# Patient Record
Sex: FEMALE | Race: WHITE | NOT HISPANIC OR LATINO | URBAN - METROPOLITAN AREA
[De-identification: names, ages, dates, MRNs, and addresses within clinical notes are randomized per-mention and may not be internally consistent; named-entity substitution may affect disease eponyms.]

---

## 2018-05-08 NOTE — ASU PATIENT PROFILE, ADULT - PMH
Arthropathy  Arthritis  Essential hypertension  HTN (hypertension)  Hyperlipidemia  Hyperlipidemia  Hypothyroidism  Hypothyroidism  Uncontrolled type 2 diabetes mellitus  Diabetes mellitus type II, uncontrolled

## 2018-05-09 ENCOUNTER — OUTPATIENT (OUTPATIENT)
Dept: OUTPATIENT SERVICES | Facility: HOSPITAL | Age: 73
LOS: 1 days | Discharge: HOME | End: 2018-05-09

## 2018-05-09 VITALS
RESPIRATION RATE: 20 BRPM | HEIGHT: 62 IN | HEART RATE: 68 BPM | SYSTOLIC BLOOD PRESSURE: 186 MMHG | DIASTOLIC BLOOD PRESSURE: 70 MMHG | TEMPERATURE: 96 F | WEIGHT: 156.09 LBS

## 2018-05-09 VITALS — SYSTOLIC BLOOD PRESSURE: 130 MMHG | HEART RATE: 77 BPM | DIASTOLIC BLOOD PRESSURE: 82 MMHG

## 2018-05-09 DIAGNOSIS — Z95.5 PRESENCE OF CORONARY ANGIOPLASTY IMPLANT AND GRAFT: Chronic | ICD-10-CM

## 2018-05-09 RX ORDER — LISINOPRIL 2.5 MG/1
1 TABLET ORAL
Qty: 0 | Refills: 0 | COMMUNITY

## 2018-05-09 RX ORDER — CLOPIDOGREL BISULFATE 75 MG/1
1 TABLET, FILM COATED ORAL
Qty: 0 | Refills: 0 | COMMUNITY

## 2018-05-11 DIAGNOSIS — E78.00 PURE HYPERCHOLESTEROLEMIA, UNSPECIFIED: ICD-10-CM

## 2018-05-11 DIAGNOSIS — H26.9 UNSPECIFIED CATARACT: ICD-10-CM

## 2018-05-11 DIAGNOSIS — I10 ESSENTIAL (PRIMARY) HYPERTENSION: ICD-10-CM

## 2020-01-01 ENCOUNTER — INPATIENT (INPATIENT)
Facility: HOSPITAL | Age: 75
LOS: 18 days | End: 2020-04-15
Attending: SURGERY | Admitting: SURGERY
Payer: MEDICARE

## 2020-01-01 VITALS
DIASTOLIC BLOOD PRESSURE: 90 MMHG | WEIGHT: 149.91 LBS | RESPIRATION RATE: 20 BRPM | HEART RATE: 85 BPM | SYSTOLIC BLOOD PRESSURE: 197 MMHG | TEMPERATURE: 98 F | OXYGEN SATURATION: 95 %

## 2020-01-01 DIAGNOSIS — J93.0 SPONTANEOUS TENSION PNEUMOTHORAX: ICD-10-CM

## 2020-01-01 DIAGNOSIS — E87.6 HYPOKALEMIA: ICD-10-CM

## 2020-01-01 DIAGNOSIS — Z79.01 LONG TERM (CURRENT) USE OF ANTICOAGULANTS: ICD-10-CM

## 2020-01-01 DIAGNOSIS — N17.0 ACUTE KIDNEY FAILURE WITH TUBULAR NECROSIS: ICD-10-CM

## 2020-01-01 DIAGNOSIS — A41.9 SEPSIS, UNSPECIFIED ORGANISM: ICD-10-CM

## 2020-01-01 DIAGNOSIS — K72.00 ACUTE AND SUBACUTE HEPATIC FAILURE WITHOUT COMA: ICD-10-CM

## 2020-01-01 DIAGNOSIS — Z95.5 PRESENCE OF CORONARY ANGIOPLASTY IMPLANT AND GRAFT: Chronic | ICD-10-CM

## 2020-01-01 DIAGNOSIS — D64.9 ANEMIA, UNSPECIFIED: ICD-10-CM

## 2020-01-01 DIAGNOSIS — R00.1 BRADYCARDIA, UNSPECIFIED: ICD-10-CM

## 2020-01-01 DIAGNOSIS — E87.2 ACIDOSIS: ICD-10-CM

## 2020-01-01 DIAGNOSIS — Z79.84 LONG TERM (CURRENT) USE OF ORAL HYPOGLYCEMIC DRUGS: ICD-10-CM

## 2020-01-01 DIAGNOSIS — E83.39 OTHER DISORDERS OF PHOSPHORUS METABOLISM: ICD-10-CM

## 2020-01-01 DIAGNOSIS — I48.91 UNSPECIFIED ATRIAL FIBRILLATION: ICD-10-CM

## 2020-01-01 DIAGNOSIS — J98.2 INTERSTITIAL EMPHYSEMA: ICD-10-CM

## 2020-01-01 DIAGNOSIS — Z95.5 PRESENCE OF CORONARY ANGIOPLASTY IMPLANT AND GRAFT: ICD-10-CM

## 2020-01-01 DIAGNOSIS — J12.89 OTHER VIRAL PNEUMONIA: ICD-10-CM

## 2020-01-01 DIAGNOSIS — J80 ACUTE RESPIRATORY DISTRESS SYNDROME: ICD-10-CM

## 2020-01-01 DIAGNOSIS — U07.1 COVID-19: ICD-10-CM

## 2020-01-01 DIAGNOSIS — E87.5 HYPERKALEMIA: ICD-10-CM

## 2020-01-01 DIAGNOSIS — I10 ESSENTIAL (PRIMARY) HYPERTENSION: ICD-10-CM

## 2020-01-01 DIAGNOSIS — I25.10 ATHEROSCLEROTIC HEART DISEASE OF NATIVE CORONARY ARTERY WITHOUT ANGINA PECTORIS: ICD-10-CM

## 2020-01-01 DIAGNOSIS — E78.5 HYPERLIPIDEMIA, UNSPECIFIED: ICD-10-CM

## 2020-01-01 DIAGNOSIS — E86.0 DEHYDRATION: ICD-10-CM

## 2020-01-01 DIAGNOSIS — E11.65 TYPE 2 DIABETES MELLITUS WITH HYPERGLYCEMIA: ICD-10-CM

## 2020-01-01 DIAGNOSIS — E03.9 HYPOTHYROIDISM, UNSPECIFIED: ICD-10-CM

## 2020-01-01 DIAGNOSIS — R65.21 SEVERE SEPSIS WITH SEPTIC SHOCK: ICD-10-CM

## 2020-01-01 DIAGNOSIS — Z91.81 HISTORY OF FALLING: ICD-10-CM

## 2020-01-01 LAB
4/8 RATIO: 0.79 RATIO — LOW (ref 0.86–4.14)
4/8 RATIO: 0.87 RATIO — SIGNIFICANT CHANGE UP (ref 0.86–4.14)
A-TUMOR NECROSIS FACT SERPL-MCNC: <5 PG/ML — SIGNIFICANT CHANGE UP
ABS CD8: 106 /UL — SIGNIFICANT CHANGE UP (ref 90–775)
ABS CD8: 68 /UL — LOW (ref 90–775)
ALBUMIN SERPL ELPH-MCNC: 1.3 G/DL — LOW (ref 3.5–5.2)
ALBUMIN SERPL ELPH-MCNC: 1.4 G/DL — LOW (ref 3.5–5.2)
ALBUMIN SERPL ELPH-MCNC: 1.5 G/DL — LOW (ref 3.5–5.2)
ALBUMIN SERPL ELPH-MCNC: 1.5 G/DL — LOW (ref 3.5–5.2)
ALBUMIN SERPL ELPH-MCNC: 1.6 G/DL — LOW (ref 3.5–5.2)
ALBUMIN SERPL ELPH-MCNC: 1.7 G/DL — LOW (ref 3.5–5.2)
ALBUMIN SERPL ELPH-MCNC: 1.7 G/DL — LOW (ref 3.5–5.2)
ALBUMIN SERPL ELPH-MCNC: 1.8 G/DL — LOW (ref 3.5–5.2)
ALBUMIN SERPL ELPH-MCNC: 1.9 G/DL — LOW (ref 3.5–5.2)
ALBUMIN SERPL ELPH-MCNC: 2.1 G/DL — LOW (ref 3.5–5.2)
ALBUMIN SERPL ELPH-MCNC: 2.2 G/DL — LOW (ref 3.5–5.2)
ALBUMIN SERPL ELPH-MCNC: 2.3 G/DL — LOW (ref 3.5–5.2)
ALBUMIN SERPL ELPH-MCNC: 2.4 G/DL — LOW (ref 3.5–5.2)
ALBUMIN SERPL ELPH-MCNC: 2.4 G/DL — LOW (ref 3.5–5.2)
ALBUMIN SERPL ELPH-MCNC: 2.6 G/DL — LOW (ref 3.5–5.2)
ALBUMIN SERPL ELPH-MCNC: 2.9 G/DL — LOW (ref 3.5–5.2)
ALBUMIN SERPL ELPH-MCNC: 3.3 G/DL — LOW (ref 3.5–5.2)
ALBUMIN SERPL ELPH-MCNC: 3.3 G/DL — LOW (ref 3.5–5.2)
ALBUMIN SERPL ELPH-MCNC: 3.5 G/DL — SIGNIFICANT CHANGE UP (ref 3.5–5.2)
ALBUMIN SERPL ELPH-MCNC: 4 G/DL — SIGNIFICANT CHANGE UP (ref 3.5–5.2)
ALP SERPL-CCNC: 100 U/L — SIGNIFICANT CHANGE UP (ref 30–115)
ALP SERPL-CCNC: 100 U/L — SIGNIFICANT CHANGE UP (ref 30–115)
ALP SERPL-CCNC: 105 U/L — SIGNIFICANT CHANGE UP (ref 30–115)
ALP SERPL-CCNC: 110 U/L — SIGNIFICANT CHANGE UP (ref 30–115)
ALP SERPL-CCNC: 112 U/L — SIGNIFICANT CHANGE UP (ref 30–115)
ALP SERPL-CCNC: 117 U/L — HIGH (ref 30–115)
ALP SERPL-CCNC: 124 U/L — HIGH (ref 30–115)
ALP SERPL-CCNC: 125 U/L — HIGH (ref 30–115)
ALP SERPL-CCNC: 127 U/L — HIGH (ref 30–115)
ALP SERPL-CCNC: 130 U/L — HIGH (ref 30–115)
ALP SERPL-CCNC: 137 U/L — HIGH (ref 30–115)
ALP SERPL-CCNC: 138 U/L — HIGH (ref 30–115)
ALP SERPL-CCNC: 152 U/L — HIGH (ref 30–115)
ALP SERPL-CCNC: 161 U/L — HIGH (ref 30–115)
ALP SERPL-CCNC: 186 U/L — HIGH (ref 30–115)
ALP SERPL-CCNC: 189 U/L — HIGH (ref 30–115)
ALP SERPL-CCNC: 192 U/L — HIGH (ref 30–115)
ALP SERPL-CCNC: 204 U/L — HIGH (ref 30–115)
ALP SERPL-CCNC: 238 U/L — HIGH (ref 30–115)
ALP SERPL-CCNC: 270 U/L — HIGH (ref 30–115)
ALP SERPL-CCNC: 272 U/L — HIGH (ref 30–115)
ALP SERPL-CCNC: 277 U/L — HIGH (ref 30–115)
ALP SERPL-CCNC: 288 U/L — HIGH (ref 30–115)
ALP SERPL-CCNC: 66 U/L — SIGNIFICANT CHANGE UP (ref 30–115)
ALP SERPL-CCNC: 72 U/L — SIGNIFICANT CHANGE UP (ref 30–115)
ALP SERPL-CCNC: 75 U/L — SIGNIFICANT CHANGE UP (ref 30–115)
ALP SERPL-CCNC: 76 U/L — SIGNIFICANT CHANGE UP (ref 30–115)
ALP SERPL-CCNC: 84 U/L — SIGNIFICANT CHANGE UP (ref 30–115)
ALP SERPL-CCNC: 92 U/L — SIGNIFICANT CHANGE UP (ref 30–115)
ALT FLD-CCNC: 101 U/L — HIGH (ref 0–41)
ALT FLD-CCNC: 111 U/L — HIGH (ref 0–41)
ALT FLD-CCNC: 112 U/L — HIGH (ref 0–41)
ALT FLD-CCNC: 114 U/L — HIGH (ref 0–41)
ALT FLD-CCNC: 137 U/L — HIGH (ref 0–41)
ALT FLD-CCNC: 14 U/L — SIGNIFICANT CHANGE UP (ref 0–41)
ALT FLD-CCNC: 15 U/L — SIGNIFICANT CHANGE UP (ref 0–41)
ALT FLD-CCNC: 1512 U/L — HIGH (ref 0–41)
ALT FLD-CCNC: 154 U/L — HIGH (ref 0–41)
ALT FLD-CCNC: 16 U/L — SIGNIFICANT CHANGE UP (ref 0–41)
ALT FLD-CCNC: 17 U/L — SIGNIFICANT CHANGE UP (ref 0–41)
ALT FLD-CCNC: 17 U/L — SIGNIFICANT CHANGE UP (ref 0–41)
ALT FLD-CCNC: 19 U/L — SIGNIFICANT CHANGE UP (ref 0–41)
ALT FLD-CCNC: 20 U/L — SIGNIFICANT CHANGE UP (ref 0–41)
ALT FLD-CCNC: 21 U/L — SIGNIFICANT CHANGE UP (ref 0–41)
ALT FLD-CCNC: 22 U/L — SIGNIFICANT CHANGE UP (ref 0–41)
ALT FLD-CCNC: 22 U/L — SIGNIFICANT CHANGE UP (ref 0–41)
ALT FLD-CCNC: 25 U/L — SIGNIFICANT CHANGE UP (ref 0–41)
ALT FLD-CCNC: 27 U/L — SIGNIFICANT CHANGE UP (ref 0–41)
ALT FLD-CCNC: 3343 U/L — HIGH (ref 0–41)
ALT FLD-CCNC: 82 U/L — HIGH (ref 0–41)
ALT FLD-CCNC: 87 U/L — HIGH (ref 0–41)
ALT FLD-CCNC: 89 U/L — HIGH (ref 0–41)
ALT FLD-CCNC: 90 U/L — HIGH (ref 0–41)
ALT FLD-CCNC: 98 U/L — HIGH (ref 0–41)
ANION GAP SERPL CALC-SCNC: 11 MMOL/L — SIGNIFICANT CHANGE UP (ref 7–14)
ANION GAP SERPL CALC-SCNC: 11 MMOL/L — SIGNIFICANT CHANGE UP (ref 7–14)
ANION GAP SERPL CALC-SCNC: 12 MMOL/L — SIGNIFICANT CHANGE UP (ref 7–14)
ANION GAP SERPL CALC-SCNC: 13 MMOL/L — SIGNIFICANT CHANGE UP (ref 7–14)
ANION GAP SERPL CALC-SCNC: 14 MMOL/L — SIGNIFICANT CHANGE UP (ref 7–14)
ANION GAP SERPL CALC-SCNC: 15 MMOL/L — HIGH (ref 7–14)
ANION GAP SERPL CALC-SCNC: 17 MMOL/L — HIGH (ref 7–14)
ANION GAP SERPL CALC-SCNC: 18 MMOL/L — HIGH (ref 7–14)
ANION GAP SERPL CALC-SCNC: 19 MMOL/L — HIGH (ref 7–14)
ANION GAP SERPL CALC-SCNC: 20 MMOL/L — HIGH (ref 7–14)
ANION GAP SERPL CALC-SCNC: 21 MMOL/L — HIGH (ref 7–14)
ANION GAP SERPL CALC-SCNC: 21 MMOL/L — HIGH (ref 7–14)
ANION GAP SERPL CALC-SCNC: 22 MMOL/L — HIGH (ref 7–14)
ANION GAP SERPL CALC-SCNC: 23 MMOL/L — HIGH (ref 7–14)
ANION GAP SERPL CALC-SCNC: 28 MMOL/L — HIGH (ref 7–14)
ANION GAP SERPL CALC-SCNC: 33 MMOL/L — HIGH (ref 7–14)
ANISOCYTOSIS BLD QL: SLIGHT — SIGNIFICANT CHANGE UP
ANISOCYTOSIS BLD QL: SLIGHT — SIGNIFICANT CHANGE UP
APPEARANCE UR: ABNORMAL
APTT BLD: 23.9 SEC — CRITICAL LOW (ref 27–39.2)
APTT BLD: 26.3 SEC — LOW (ref 27–39.2)
APTT BLD: 26.6 SEC — LOW (ref 27–39.2)
APTT BLD: 28.3 SEC — SIGNIFICANT CHANGE UP (ref 27–39.2)
APTT BLD: 29.1 SEC — SIGNIFICANT CHANGE UP (ref 27–39.2)
APTT BLD: 29.2 SEC — SIGNIFICANT CHANGE UP (ref 27–39.2)
APTT BLD: 29.3 SEC — SIGNIFICANT CHANGE UP (ref 27–39.2)
APTT BLD: 29.4 SEC — SIGNIFICANT CHANGE UP (ref 27–39.2)
APTT BLD: 30.7 SEC — SIGNIFICANT CHANGE UP (ref 27–39.2)
APTT BLD: 31 SEC — SIGNIFICANT CHANGE UP (ref 27–39.2)
APTT BLD: 33.3 SEC — SIGNIFICANT CHANGE UP (ref 27–39.2)
APTT BLD: 37.7 SEC — SIGNIFICANT CHANGE UP (ref 27–39.2)
APTT BLD: 37.8 SEC — SIGNIFICANT CHANGE UP (ref 27–39.2)
APTT BLD: 41.4 SEC — HIGH (ref 27–39.2)
AST SERPL-CCNC: 10 U/L — SIGNIFICANT CHANGE UP (ref 0–41)
AST SERPL-CCNC: 107 U/L — HIGH (ref 0–41)
AST SERPL-CCNC: 11 U/L — SIGNIFICANT CHANGE UP (ref 0–41)
AST SERPL-CCNC: 11 U/L — SIGNIFICANT CHANGE UP (ref 0–41)
AST SERPL-CCNC: 12 U/L — SIGNIFICANT CHANGE UP (ref 0–41)
AST SERPL-CCNC: 122 U/L — HIGH (ref 0–41)
AST SERPL-CCNC: 13 U/L — SIGNIFICANT CHANGE UP (ref 0–41)
AST SERPL-CCNC: 18 U/L — SIGNIFICANT CHANGE UP (ref 0–41)
AST SERPL-CCNC: 19 U/L — SIGNIFICANT CHANGE UP (ref 0–41)
AST SERPL-CCNC: 20 U/L — SIGNIFICANT CHANGE UP (ref 0–41)
AST SERPL-CCNC: 21 U/L — SIGNIFICANT CHANGE UP (ref 0–41)
AST SERPL-CCNC: 24 U/L — SIGNIFICANT CHANGE UP (ref 0–41)
AST SERPL-CCNC: 2871 U/L — HIGH (ref 0–41)
AST SERPL-CCNC: 33 U/L — SIGNIFICANT CHANGE UP (ref 0–41)
AST SERPL-CCNC: 41 U/L — SIGNIFICANT CHANGE UP (ref 0–41)
AST SERPL-CCNC: 48 U/L — HIGH (ref 0–41)
AST SERPL-CCNC: 54 U/L — HIGH (ref 0–41)
AST SERPL-CCNC: 57 U/L — HIGH (ref 0–41)
AST SERPL-CCNC: 62 U/L — HIGH (ref 0–41)
AST SERPL-CCNC: 63 U/L — HIGH (ref 0–41)
AST SERPL-CCNC: 67 U/L — HIGH (ref 0–41)
AST SERPL-CCNC: 68 U/L — HIGH (ref 0–41)
AST SERPL-CCNC: 8 U/L — SIGNIFICANT CHANGE UP (ref 0–41)
AST SERPL-CCNC: 9 U/L — SIGNIFICANT CHANGE UP (ref 0–41)
AST SERPL-CCNC: >7000 U/L — HIGH (ref 0–41)
BACTERIA # UR AUTO: NEGATIVE — SIGNIFICANT CHANGE UP
BASE EXCESS BLDA CALC-SCNC: -0.5 MMOL/L — SIGNIFICANT CHANGE UP (ref -2–2)
BASE EXCESS BLDA CALC-SCNC: -0.6 MMOL/L — SIGNIFICANT CHANGE UP (ref -2–2)
BASE EXCESS BLDA CALC-SCNC: -2.7 MMOL/L — LOW (ref -2–2)
BASE EXCESS BLDA CALC-SCNC: -3.5 MMOL/L — LOW (ref -2–2)
BASE EXCESS BLDA CALC-SCNC: 0.2 MMOL/L — SIGNIFICANT CHANGE UP (ref -2–2)
BASE EXCESS BLDA CALC-SCNC: 0.3 MMOL/L — SIGNIFICANT CHANGE UP (ref -2–2)
BASE EXCESS BLDV CALC-SCNC: 2.4 MMOL/L — HIGH (ref -2–2)
BASOPHILS # BLD AUTO: 0 K/UL — SIGNIFICANT CHANGE UP (ref 0–0.2)
BASOPHILS # BLD AUTO: 0.01 K/UL — SIGNIFICANT CHANGE UP (ref 0–0.2)
BASOPHILS # BLD AUTO: 0.01 K/UL — SIGNIFICANT CHANGE UP (ref 0–0.2)
BASOPHILS # BLD AUTO: 0.02 K/UL — SIGNIFICANT CHANGE UP (ref 0–0.2)
BASOPHILS # BLD AUTO: 0.03 K/UL — SIGNIFICANT CHANGE UP (ref 0–0.2)
BASOPHILS # BLD AUTO: 0.04 K/UL — SIGNIFICANT CHANGE UP (ref 0–0.2)
BASOPHILS # BLD AUTO: 0.05 K/UL — SIGNIFICANT CHANGE UP (ref 0–0.2)
BASOPHILS NFR BLD AUTO: 0 % — SIGNIFICANT CHANGE UP (ref 0–1)
BASOPHILS NFR BLD AUTO: 0.1 % — SIGNIFICANT CHANGE UP (ref 0–1)
BASOPHILS NFR BLD AUTO: 0.2 % — SIGNIFICANT CHANGE UP (ref 0–1)
BASOPHILS NFR BLD AUTO: 0.3 % — SIGNIFICANT CHANGE UP (ref 0–1)
BASOPHILS NFR BLD AUTO: 0.4 % — SIGNIFICANT CHANGE UP (ref 0–1)
BASOPHILS NFR BLD AUTO: 0.5 % — SIGNIFICANT CHANGE UP (ref 0–1)
BILIRUB DIRECT SERPL-MCNC: 0.2 MG/DL — SIGNIFICANT CHANGE UP (ref 0–0.2)
BILIRUB DIRECT SERPL-MCNC: 0.2 MG/DL — SIGNIFICANT CHANGE UP (ref 0–0.2)
BILIRUB DIRECT SERPL-MCNC: 0.4 MG/DL — HIGH (ref 0–0.2)
BILIRUB DIRECT SERPL-MCNC: <0.2 MG/DL — SIGNIFICANT CHANGE UP (ref 0–0.2)
BILIRUB INDIRECT FLD-MCNC: 0.1 MG/DL — LOW (ref 0.2–1.2)
BILIRUB INDIRECT FLD-MCNC: 0.2 MG/DL — SIGNIFICANT CHANGE UP (ref 0.2–1.2)
BILIRUB INDIRECT FLD-MCNC: 1 MG/DL — SIGNIFICANT CHANGE UP (ref 0.2–1.2)
BILIRUB INDIRECT FLD-MCNC: >0.1 MG/DL — LOW (ref 0.2–1.2)
BILIRUB SERPL-MCNC: 0.2 MG/DL — SIGNIFICANT CHANGE UP (ref 0.2–1.2)
BILIRUB SERPL-MCNC: 0.3 MG/DL — SIGNIFICANT CHANGE UP (ref 0.2–1.2)
BILIRUB SERPL-MCNC: 0.4 MG/DL — SIGNIFICANT CHANGE UP (ref 0.2–1.2)
BILIRUB SERPL-MCNC: 0.5 MG/DL — SIGNIFICANT CHANGE UP (ref 0.2–1.2)
BILIRUB SERPL-MCNC: 0.6 MG/DL — SIGNIFICANT CHANGE UP (ref 0.2–1.2)
BILIRUB SERPL-MCNC: 1.2 MG/DL — SIGNIFICANT CHANGE UP (ref 0.2–1.2)
BILIRUB UR-MCNC: NEGATIVE — SIGNIFICANT CHANGE UP
BLD GP AB SCN SERPL QL: SIGNIFICANT CHANGE UP
BUN SERPL-MCNC: 103 MG/DL — CRITICAL HIGH (ref 10–20)
BUN SERPL-MCNC: 103 MG/DL — CRITICAL HIGH (ref 10–20)
BUN SERPL-MCNC: 107 MG/DL — CRITICAL HIGH (ref 10–20)
BUN SERPL-MCNC: 117 MG/DL — CRITICAL HIGH (ref 10–20)
BUN SERPL-MCNC: 122 MG/DL — CRITICAL HIGH (ref 10–20)
BUN SERPL-MCNC: 13 MG/DL — SIGNIFICANT CHANGE UP (ref 10–20)
BUN SERPL-MCNC: 13 MG/DL — SIGNIFICANT CHANGE UP (ref 10–20)
BUN SERPL-MCNC: 133 MG/DL — CRITICAL HIGH (ref 10–20)
BUN SERPL-MCNC: 14 MG/DL — SIGNIFICANT CHANGE UP (ref 10–20)
BUN SERPL-MCNC: 15 MG/DL — SIGNIFICANT CHANGE UP (ref 10–20)
BUN SERPL-MCNC: 20 MG/DL — SIGNIFICANT CHANGE UP (ref 10–20)
BUN SERPL-MCNC: 22 MG/DL — HIGH (ref 10–20)
BUN SERPL-MCNC: 23 MG/DL — HIGH (ref 10–20)
BUN SERPL-MCNC: 24 MG/DL — HIGH (ref 10–20)
BUN SERPL-MCNC: 32 MG/DL — HIGH (ref 10–20)
BUN SERPL-MCNC: 34 MG/DL — HIGH (ref 10–20)
BUN SERPL-MCNC: 36 MG/DL — HIGH (ref 10–20)
BUN SERPL-MCNC: 39 MG/DL — HIGH (ref 10–20)
BUN SERPL-MCNC: 44 MG/DL — HIGH (ref 10–20)
BUN SERPL-MCNC: 46 MG/DL — HIGH (ref 10–20)
BUN SERPL-MCNC: 48 MG/DL — HIGH (ref 10–20)
BUN SERPL-MCNC: 51 MG/DL — HIGH (ref 10–20)
BUN SERPL-MCNC: 58 MG/DL — HIGH (ref 10–20)
BUN SERPL-MCNC: 59 MG/DL — HIGH (ref 10–20)
BUN SERPL-MCNC: 60 MG/DL — HIGH (ref 10–20)
BUN SERPL-MCNC: 64 MG/DL — CRITICAL HIGH (ref 10–20)
BUN SERPL-MCNC: 68 MG/DL — CRITICAL HIGH (ref 10–20)
BUN SERPL-MCNC: 70 MG/DL — CRITICAL HIGH (ref 10–20)
BUN SERPL-MCNC: 80 MG/DL — CRITICAL HIGH (ref 10–20)
BUN SERPL-MCNC: 81 MG/DL — CRITICAL HIGH (ref 10–20)
BUN SERPL-MCNC: 81 MG/DL — CRITICAL HIGH (ref 10–20)
BUN SERPL-MCNC: 84 MG/DL — CRITICAL HIGH (ref 10–20)
BUN SERPL-MCNC: 87 MG/DL — CRITICAL HIGH (ref 10–20)
BUN SERPL-MCNC: 95 MG/DL — CRITICAL HIGH (ref 10–20)
CA-I BLD-SCNC: 1.25 MMOL/L — SIGNIFICANT CHANGE UP (ref 1.12–1.3)
CA-I SERPL-SCNC: 1.12 MMOL/L — SIGNIFICANT CHANGE UP (ref 1.12–1.3)
CALCIUM SERPL-MCNC: 7 MG/DL — LOW (ref 8.5–10.1)
CALCIUM SERPL-MCNC: 7.2 MG/DL — LOW (ref 8.5–10.1)
CALCIUM SERPL-MCNC: 7.3 MG/DL — LOW (ref 8.5–10.1)
CALCIUM SERPL-MCNC: 7.4 MG/DL — LOW (ref 8.5–10.1)
CALCIUM SERPL-MCNC: 7.4 MG/DL — LOW (ref 8.5–10.1)
CALCIUM SERPL-MCNC: 7.5 MG/DL — LOW (ref 8.5–10.1)
CALCIUM SERPL-MCNC: 7.6 MG/DL — LOW (ref 8.5–10.1)
CALCIUM SERPL-MCNC: 7.8 MG/DL — LOW (ref 8.5–10.1)
CALCIUM SERPL-MCNC: 8 MG/DL — LOW (ref 8.5–10.1)
CALCIUM SERPL-MCNC: 8.1 MG/DL — LOW (ref 8.5–10.1)
CALCIUM SERPL-MCNC: 8.2 MG/DL — LOW (ref 8.5–10.1)
CALCIUM SERPL-MCNC: 8.2 MG/DL — LOW (ref 8.5–10.1)
CALCIUM SERPL-MCNC: 8.3 MG/DL — LOW (ref 8.5–10.1)
CALCIUM SERPL-MCNC: 8.3 MG/DL — LOW (ref 8.5–10.1)
CALCIUM SERPL-MCNC: 8.4 MG/DL — LOW (ref 8.5–10.1)
CALCIUM SERPL-MCNC: 8.5 MG/DL — SIGNIFICANT CHANGE UP (ref 8.5–10.1)
CALCIUM SERPL-MCNC: 8.5 MG/DL — SIGNIFICANT CHANGE UP (ref 8.5–10.1)
CALCIUM SERPL-MCNC: 8.6 MG/DL — SIGNIFICANT CHANGE UP (ref 8.5–10.1)
CALCIUM SERPL-MCNC: 8.7 MG/DL — SIGNIFICANT CHANGE UP (ref 8.5–10.1)
CALCIUM SERPL-MCNC: 9 MG/DL — SIGNIFICANT CHANGE UP (ref 8.5–10.1)
CD16+CD56+ CELLS NFR BLD: 15 % — SIGNIFICANT CHANGE UP (ref 7–27)
CD16+CD56+ CELLS NFR BLD: 9 % — SIGNIFICANT CHANGE UP (ref 7–27)
CD16+CD56+ CELLS NFR SPEC: 29 /UL — LOW (ref 80–426)
CD16+CD56+ CELLS NFR SPEC: 34 /UL — LOW (ref 80–426)
CD19 BLASTS SPEC-ACNC: 111 /UL — SIGNIFICANT CHANGE UP (ref 32–326)
CD19 BLASTS SPEC-ACNC: 30 % — HIGH (ref 4–18)
CD19 BLASTS SPEC-ACNC: 33 % — HIGH (ref 4–18)
CD19 BLASTS SPEC-ACNC: 67 /UL — SIGNIFICANT CHANGE UP (ref 32–326)
CD3 BLASTS SPEC-ACNC: 118 /UL — LOW (ref 396–2024)
CD3 BLASTS SPEC-ACNC: 187 /UL — LOW (ref 396–2024)
CD3 BLASTS SPEC-ACNC: 53 % — LOW (ref 58–84)
CD3 BLASTS SPEC-ACNC: 56 % — LOW (ref 58–84)
CD4 %: 24 % — LOW (ref 30–56)
CD4 %: 28 % — LOW (ref 30–56)
CD8 %: 31 % — SIGNIFICANT CHANGE UP (ref 11–43)
CD8 %: 32 % — SIGNIFICANT CHANGE UP (ref 11–43)
CHLORIDE SERPL-SCNC: 100 MMOL/L — SIGNIFICANT CHANGE UP (ref 98–110)
CHLORIDE SERPL-SCNC: 100 MMOL/L — SIGNIFICANT CHANGE UP (ref 98–110)
CHLORIDE SERPL-SCNC: 102 MMOL/L — SIGNIFICANT CHANGE UP (ref 98–110)
CHLORIDE SERPL-SCNC: 103 MMOL/L — SIGNIFICANT CHANGE UP (ref 98–110)
CHLORIDE SERPL-SCNC: 104 MMOL/L — SIGNIFICANT CHANGE UP (ref 98–110)
CHLORIDE SERPL-SCNC: 106 MMOL/L — SIGNIFICANT CHANGE UP (ref 98–110)
CHLORIDE SERPL-SCNC: 107 MMOL/L — SIGNIFICANT CHANGE UP (ref 98–110)
CHLORIDE SERPL-SCNC: 108 MMOL/L — SIGNIFICANT CHANGE UP (ref 98–110)
CHLORIDE SERPL-SCNC: 92 MMOL/L — LOW (ref 98–110)
CHLORIDE SERPL-SCNC: 97 MMOL/L — LOW (ref 98–110)
CHLORIDE SERPL-SCNC: 99 MMOL/L — SIGNIFICANT CHANGE UP (ref 98–110)
CK MB CFR SERPL CALC: 2.1 NG/ML — SIGNIFICANT CHANGE UP (ref 0.6–6.3)
CK MB CFR SERPL CALC: 3.5 NG/ML — SIGNIFICANT CHANGE UP (ref 0.6–6.3)
CK SERPL-CCNC: 103 U/L — SIGNIFICANT CHANGE UP (ref 0–225)
CK SERPL-CCNC: 14 U/L — SIGNIFICANT CHANGE UP (ref 0–225)
CK SERPL-CCNC: 178 U/L — SIGNIFICANT CHANGE UP (ref 0–225)
CK SERPL-CCNC: 21 U/L — SIGNIFICANT CHANGE UP (ref 0–225)
CK SERPL-CCNC: 29 U/L — SIGNIFICANT CHANGE UP (ref 0–225)
CK SERPL-CCNC: 39 U/L — SIGNIFICANT CHANGE UP (ref 0–225)
CK SERPL-CCNC: 40 U/L — SIGNIFICANT CHANGE UP (ref 0–225)
CK SERPL-CCNC: 71 U/L — SIGNIFICANT CHANGE UP (ref 0–225)
CO2 SERPL-SCNC: 10 MMOL/L — LOW (ref 17–32)
CO2 SERPL-SCNC: 14 MMOL/L — LOW (ref 17–32)
CO2 SERPL-SCNC: 16 MMOL/L — LOW (ref 17–32)
CO2 SERPL-SCNC: 17 MMOL/L — SIGNIFICANT CHANGE UP (ref 17–32)
CO2 SERPL-SCNC: 18 MMOL/L — SIGNIFICANT CHANGE UP (ref 17–32)
CO2 SERPL-SCNC: 18 MMOL/L — SIGNIFICANT CHANGE UP (ref 17–32)
CO2 SERPL-SCNC: 19 MMOL/L — SIGNIFICANT CHANGE UP (ref 17–32)
CO2 SERPL-SCNC: 20 MMOL/L — SIGNIFICANT CHANGE UP (ref 17–32)
CO2 SERPL-SCNC: 21 MMOL/L — SIGNIFICANT CHANGE UP (ref 17–32)
CO2 SERPL-SCNC: 22 MMOL/L — SIGNIFICANT CHANGE UP (ref 17–32)
CO2 SERPL-SCNC: 23 MMOL/L — SIGNIFICANT CHANGE UP (ref 17–32)
CO2 SERPL-SCNC: 24 MMOL/L — SIGNIFICANT CHANGE UP (ref 17–32)
CO2 SERPL-SCNC: 25 MMOL/L — SIGNIFICANT CHANGE UP (ref 17–32)
CO2 SERPL-SCNC: 25 MMOL/L — SIGNIFICANT CHANGE UP (ref 17–32)
COLOR SPEC: YELLOW — SIGNIFICANT CHANGE UP
CREAT ?TM UR-MCNC: 113 MG/DL — SIGNIFICANT CHANGE UP
CREAT SERPL-MCNC: 0.6 MG/DL — LOW (ref 0.7–1.5)
CREAT SERPL-MCNC: 0.7 MG/DL — SIGNIFICANT CHANGE UP (ref 0.7–1.5)
CREAT SERPL-MCNC: 0.7 MG/DL — SIGNIFICANT CHANGE UP (ref 0.7–1.5)
CREAT SERPL-MCNC: 0.8 MG/DL — SIGNIFICANT CHANGE UP (ref 0.7–1.5)
CREAT SERPL-MCNC: 0.9 MG/DL — SIGNIFICANT CHANGE UP (ref 0.7–1.5)
CREAT SERPL-MCNC: 0.9 MG/DL — SIGNIFICANT CHANGE UP (ref 0.7–1.5)
CREAT SERPL-MCNC: 1 MG/DL — SIGNIFICANT CHANGE UP (ref 0.7–1.5)
CREAT SERPL-MCNC: 1.1 MG/DL — SIGNIFICANT CHANGE UP (ref 0.7–1.5)
CREAT SERPL-MCNC: 1.2 MG/DL — SIGNIFICANT CHANGE UP (ref 0.7–1.5)
CREAT SERPL-MCNC: 1.3 MG/DL — SIGNIFICANT CHANGE UP (ref 0.7–1.5)
CREAT SERPL-MCNC: 1.5 MG/DL — SIGNIFICANT CHANGE UP (ref 0.7–1.5)
CREAT SERPL-MCNC: 2.4 MG/DL — HIGH (ref 0.7–1.5)
CREAT SERPL-MCNC: 2.4 MG/DL — HIGH (ref 0.7–1.5)
CREAT SERPL-MCNC: 2.6 MG/DL — HIGH (ref 0.7–1.5)
CREAT SERPL-MCNC: 2.8 MG/DL — HIGH (ref 0.7–1.5)
CREAT SERPL-MCNC: 3 MG/DL — HIGH (ref 0.7–1.5)
CREAT SERPL-MCNC: 3.2 MG/DL — HIGH (ref 0.7–1.5)
CREAT SERPL-MCNC: 3.2 MG/DL — HIGH (ref 0.7–1.5)
CREAT SERPL-MCNC: 3.3 MG/DL — HIGH (ref 0.7–1.5)
CREAT SERPL-MCNC: 3.4 MG/DL — HIGH (ref 0.7–1.5)
CREAT SERPL-MCNC: 3.5 MG/DL — HIGH (ref 0.7–1.5)
CREAT SERPL-MCNC: 3.5 MG/DL — HIGH (ref 0.7–1.5)
CREAT SERPL-MCNC: 3.6 MG/DL — HIGH (ref 0.7–1.5)
CREAT SERPL-MCNC: 3.8 MG/DL — HIGH (ref 0.7–1.5)
CRP SERPL-MCNC: 13.07 MG/DL — HIGH (ref 0–0.4)
CRP SERPL-MCNC: 16.13 MG/DL — HIGH (ref 0–0.4)
CRP SERPL-MCNC: 18.37 MG/DL — HIGH (ref 0–0.4)
CRP SERPL-MCNC: 29.06 MG/DL — HIGH (ref 0–0.4)
CRP SERPL-MCNC: 29.87 MG/DL — HIGH (ref 0–0.4)
CRP SERPL-MCNC: 31.72 MG/DL — HIGH (ref 0–0.4)
CRP SERPL-MCNC: 36.21 MG/DL — HIGH (ref 0–0.4)
CRP SERPL-MCNC: 40.81 MG/DL — HIGH (ref 0–0.4)
CRP SERPL-MCNC: 41.26 MG/DL — HIGH (ref 0–0.4)
CRP SERPL-MCNC: 41.84 MG/DL — HIGH (ref 0–0.4)
CRP SERPL-MCNC: 5.8 MG/DL — HIGH (ref 0–0.4)
CULTURE RESULTS: SIGNIFICANT CHANGE UP
D DIMER BLD IA.RAPID-MCNC: 1095 NG/ML DDU — HIGH (ref 0–230)
D DIMER BLD IA.RAPID-MCNC: 1159 NG/ML DDU — HIGH (ref 0–230)
D DIMER BLD IA.RAPID-MCNC: 2357 NG/ML DDU — HIGH (ref 0–230)
D DIMER BLD IA.RAPID-MCNC: 2673 NG/ML DDU — HIGH (ref 0–230)
D DIMER BLD IA.RAPID-MCNC: 3109 NG/ML DDU — HIGH (ref 0–230)
D DIMER BLD IA.RAPID-MCNC: 3572 NG/ML DDU — HIGH (ref 0–230)
D DIMER BLD IA.RAPID-MCNC: 902 NG/ML DDU — HIGH (ref 0–230)
DIFF PNL FLD: ABNORMAL
EOSINOPHIL # BLD AUTO: 0 K/UL — SIGNIFICANT CHANGE UP (ref 0–0.7)
EOSINOPHIL # BLD AUTO: 0.01 K/UL — SIGNIFICANT CHANGE UP (ref 0–0.7)
EOSINOPHIL # BLD AUTO: 0.01 K/UL — SIGNIFICANT CHANGE UP (ref 0–0.7)
EOSINOPHIL # BLD AUTO: 0.02 K/UL — SIGNIFICANT CHANGE UP (ref 0–0.7)
EOSINOPHIL # BLD AUTO: 0.02 K/UL — SIGNIFICANT CHANGE UP (ref 0–0.7)
EOSINOPHIL # BLD AUTO: 0.03 K/UL — SIGNIFICANT CHANGE UP (ref 0–0.7)
EOSINOPHIL # BLD AUTO: 0.03 K/UL — SIGNIFICANT CHANGE UP (ref 0–0.7)
EOSINOPHIL # BLD AUTO: 0.04 K/UL — SIGNIFICANT CHANGE UP (ref 0–0.7)
EOSINOPHIL NFR BLD AUTO: 0 % — SIGNIFICANT CHANGE UP (ref 0–8)
EOSINOPHIL NFR BLD AUTO: 0.1 % — SIGNIFICANT CHANGE UP (ref 0–8)
EOSINOPHIL NFR BLD AUTO: 0.2 % — SIGNIFICANT CHANGE UP (ref 0–8)
EPI CELLS # UR: 2 /HPF — SIGNIFICANT CHANGE UP (ref 0–5)
ERYTHROCYTE [SEDIMENTATION RATE] IN BLOOD: 100 MM/HR — HIGH (ref 0–20)
ERYTHROCYTE [SEDIMENTATION RATE] IN BLOOD: 106 MM/HR — HIGH (ref 0–20)
ERYTHROCYTE [SEDIMENTATION RATE] IN BLOOD: 107 MM/HR — HIGH (ref 0–20)
ERYTHROCYTE [SEDIMENTATION RATE] IN BLOOD: 126 MM/HR — HIGH (ref 0–20)
ERYTHROCYTE [SEDIMENTATION RATE] IN BLOOD: 128 MM/HR — HIGH (ref 0–20)
ERYTHROCYTE [SEDIMENTATION RATE] IN BLOOD: 63 MM/HR — HIGH (ref 0–20)
ESTIMATED AVERAGE GLUCOSE: 237 MG/DL — HIGH (ref 68–114)
FERRITIN SERPL-MCNC: 1031 NG/ML — HIGH (ref 15–150)
FERRITIN SERPL-MCNC: 1066 NG/ML — HIGH (ref 15–150)
FERRITIN SERPL-MCNC: 1069 NG/ML — HIGH (ref 15–150)
FERRITIN SERPL-MCNC: 1152 NG/ML — HIGH (ref 15–150)
FERRITIN SERPL-MCNC: 211 NG/ML — HIGH (ref 15–150)
FERRITIN SERPL-MCNC: 2830 NG/ML — HIGH (ref 15–150)
FERRITIN SERPL-MCNC: 717 NG/ML — HIGH (ref 15–150)
FERRITIN SERPL-MCNC: 808 NG/ML — HIGH (ref 15–150)
FERRITIN SERPL-MCNC: 970 NG/ML — HIGH (ref 15–150)
FIBRINOGEN AG PPP IA-MCNC: 295 MG/DL — SIGNIFICANT CHANGE UP
FIBRINOGEN PPP-MCNC: >700 MG/DL — HIGH (ref 204.4–570.6)
FLU A RESULT: NEGATIVE — SIGNIFICANT CHANGE UP
FLU A RESULT: NEGATIVE — SIGNIFICANT CHANGE UP
FLUAV AG NPH QL: NEGATIVE — SIGNIFICANT CHANGE UP
FLUBV AG NPH QL: NEGATIVE — SIGNIFICANT CHANGE UP
G6PD RBC-CCNC: 18.1 U/G HGB — SIGNIFICANT CHANGE UP (ref 7–20.5)
GAS PNL BLDA: SIGNIFICANT CHANGE UP
GAS PNL BLDV: 132 MMOL/L — LOW (ref 136–145)
GAS PNL BLDV: SIGNIFICANT CHANGE UP
GIANT PLATELETS BLD QL SMEAR: PRESENT — SIGNIFICANT CHANGE UP
GLUCOSE BLDC GLUCOMTR-MCNC: 103 MG/DL — HIGH (ref 70–99)
GLUCOSE BLDC GLUCOMTR-MCNC: 103 MG/DL — HIGH (ref 70–99)
GLUCOSE BLDC GLUCOMTR-MCNC: 109 MG/DL — HIGH (ref 70–99)
GLUCOSE BLDC GLUCOMTR-MCNC: 116 MG/DL — HIGH (ref 70–99)
GLUCOSE BLDC GLUCOMTR-MCNC: 117 MG/DL — HIGH (ref 70–99)
GLUCOSE BLDC GLUCOMTR-MCNC: 118 MG/DL — HIGH (ref 70–99)
GLUCOSE BLDC GLUCOMTR-MCNC: 119 MG/DL — HIGH (ref 70–99)
GLUCOSE BLDC GLUCOMTR-MCNC: 120 MG/DL — HIGH (ref 70–99)
GLUCOSE BLDC GLUCOMTR-MCNC: 121 MG/DL — HIGH (ref 70–99)
GLUCOSE BLDC GLUCOMTR-MCNC: 121 MG/DL — HIGH (ref 70–99)
GLUCOSE BLDC GLUCOMTR-MCNC: 122 MG/DL — HIGH (ref 70–99)
GLUCOSE BLDC GLUCOMTR-MCNC: 123 MG/DL — HIGH (ref 70–99)
GLUCOSE BLDC GLUCOMTR-MCNC: 126 MG/DL — HIGH (ref 70–99)
GLUCOSE BLDC GLUCOMTR-MCNC: 127 MG/DL — HIGH (ref 70–99)
GLUCOSE BLDC GLUCOMTR-MCNC: 128 MG/DL — HIGH (ref 70–99)
GLUCOSE BLDC GLUCOMTR-MCNC: 132 MG/DL — HIGH (ref 70–99)
GLUCOSE BLDC GLUCOMTR-MCNC: 133 MG/DL — HIGH (ref 70–99)
GLUCOSE BLDC GLUCOMTR-MCNC: 134 MG/DL — HIGH (ref 70–99)
GLUCOSE BLDC GLUCOMTR-MCNC: 134 MG/DL — HIGH (ref 70–99)
GLUCOSE BLDC GLUCOMTR-MCNC: 135 MG/DL — HIGH (ref 70–99)
GLUCOSE BLDC GLUCOMTR-MCNC: 135 MG/DL — HIGH (ref 70–99)
GLUCOSE BLDC GLUCOMTR-MCNC: 137 MG/DL — HIGH (ref 70–99)
GLUCOSE BLDC GLUCOMTR-MCNC: 137 MG/DL — HIGH (ref 70–99)
GLUCOSE BLDC GLUCOMTR-MCNC: 138 MG/DL — HIGH (ref 70–99)
GLUCOSE BLDC GLUCOMTR-MCNC: 139 MG/DL — HIGH (ref 70–99)
GLUCOSE BLDC GLUCOMTR-MCNC: 141 MG/DL — HIGH (ref 70–99)
GLUCOSE BLDC GLUCOMTR-MCNC: 141 MG/DL — HIGH (ref 70–99)
GLUCOSE BLDC GLUCOMTR-MCNC: 142 MG/DL — HIGH (ref 70–99)
GLUCOSE BLDC GLUCOMTR-MCNC: 143 MG/DL — HIGH (ref 70–99)
GLUCOSE BLDC GLUCOMTR-MCNC: 144 MG/DL — HIGH (ref 70–99)
GLUCOSE BLDC GLUCOMTR-MCNC: 146 MG/DL — HIGH (ref 70–99)
GLUCOSE BLDC GLUCOMTR-MCNC: 147 MG/DL — HIGH (ref 70–99)
GLUCOSE BLDC GLUCOMTR-MCNC: 148 MG/DL — HIGH (ref 70–99)
GLUCOSE BLDC GLUCOMTR-MCNC: 149 MG/DL — HIGH (ref 70–99)
GLUCOSE BLDC GLUCOMTR-MCNC: 150 MG/DL — HIGH (ref 70–99)
GLUCOSE BLDC GLUCOMTR-MCNC: 151 MG/DL — HIGH (ref 70–99)
GLUCOSE BLDC GLUCOMTR-MCNC: 152 MG/DL — HIGH (ref 70–99)
GLUCOSE BLDC GLUCOMTR-MCNC: 152 MG/DL — HIGH (ref 70–99)
GLUCOSE BLDC GLUCOMTR-MCNC: 153 MG/DL — HIGH (ref 70–99)
GLUCOSE BLDC GLUCOMTR-MCNC: 154 MG/DL — HIGH (ref 70–99)
GLUCOSE BLDC GLUCOMTR-MCNC: 155 MG/DL — HIGH (ref 70–99)
GLUCOSE BLDC GLUCOMTR-MCNC: 156 MG/DL — HIGH (ref 70–99)
GLUCOSE BLDC GLUCOMTR-MCNC: 157 MG/DL — HIGH (ref 70–99)
GLUCOSE BLDC GLUCOMTR-MCNC: 158 MG/DL — HIGH (ref 70–99)
GLUCOSE BLDC GLUCOMTR-MCNC: 159 MG/DL — HIGH (ref 70–99)
GLUCOSE BLDC GLUCOMTR-MCNC: 161 MG/DL — HIGH (ref 70–99)
GLUCOSE BLDC GLUCOMTR-MCNC: 162 MG/DL — HIGH (ref 70–99)
GLUCOSE BLDC GLUCOMTR-MCNC: 162 MG/DL — HIGH (ref 70–99)
GLUCOSE BLDC GLUCOMTR-MCNC: 164 MG/DL — HIGH (ref 70–99)
GLUCOSE BLDC GLUCOMTR-MCNC: 164 MG/DL — HIGH (ref 70–99)
GLUCOSE BLDC GLUCOMTR-MCNC: 165 MG/DL — HIGH (ref 70–99)
GLUCOSE BLDC GLUCOMTR-MCNC: 167 MG/DL — HIGH (ref 70–99)
GLUCOSE BLDC GLUCOMTR-MCNC: 168 MG/DL — HIGH (ref 70–99)
GLUCOSE BLDC GLUCOMTR-MCNC: 169 MG/DL — HIGH (ref 70–99)
GLUCOSE BLDC GLUCOMTR-MCNC: 170 MG/DL — HIGH (ref 70–99)
GLUCOSE BLDC GLUCOMTR-MCNC: 171 MG/DL — HIGH (ref 70–99)
GLUCOSE BLDC GLUCOMTR-MCNC: 171 MG/DL — HIGH (ref 70–99)
GLUCOSE BLDC GLUCOMTR-MCNC: 172 MG/DL — HIGH (ref 70–99)
GLUCOSE BLDC GLUCOMTR-MCNC: 173 MG/DL — HIGH (ref 70–99)
GLUCOSE BLDC GLUCOMTR-MCNC: 174 MG/DL — HIGH (ref 70–99)
GLUCOSE BLDC GLUCOMTR-MCNC: 174 MG/DL — HIGH (ref 70–99)
GLUCOSE BLDC GLUCOMTR-MCNC: 175 MG/DL — HIGH (ref 70–99)
GLUCOSE BLDC GLUCOMTR-MCNC: 176 MG/DL — HIGH (ref 70–99)
GLUCOSE BLDC GLUCOMTR-MCNC: 177 MG/DL — HIGH (ref 70–99)
GLUCOSE BLDC GLUCOMTR-MCNC: 178 MG/DL — HIGH (ref 70–99)
GLUCOSE BLDC GLUCOMTR-MCNC: 179 MG/DL — HIGH (ref 70–99)
GLUCOSE BLDC GLUCOMTR-MCNC: 179 MG/DL — HIGH (ref 70–99)
GLUCOSE BLDC GLUCOMTR-MCNC: 180 MG/DL — HIGH (ref 70–99)
GLUCOSE BLDC GLUCOMTR-MCNC: 180 MG/DL — HIGH (ref 70–99)
GLUCOSE BLDC GLUCOMTR-MCNC: 182 MG/DL — HIGH (ref 70–99)
GLUCOSE BLDC GLUCOMTR-MCNC: 183 MG/DL — HIGH (ref 70–99)
GLUCOSE BLDC GLUCOMTR-MCNC: 184 MG/DL — HIGH (ref 70–99)
GLUCOSE BLDC GLUCOMTR-MCNC: 185 MG/DL — HIGH (ref 70–99)
GLUCOSE BLDC GLUCOMTR-MCNC: 186 MG/DL — HIGH (ref 70–99)
GLUCOSE BLDC GLUCOMTR-MCNC: 186 MG/DL — HIGH (ref 70–99)
GLUCOSE BLDC GLUCOMTR-MCNC: 187 MG/DL — HIGH (ref 70–99)
GLUCOSE BLDC GLUCOMTR-MCNC: 188 MG/DL — HIGH (ref 70–99)
GLUCOSE BLDC GLUCOMTR-MCNC: 189 MG/DL — HIGH (ref 70–99)
GLUCOSE BLDC GLUCOMTR-MCNC: 190 MG/DL — HIGH (ref 70–99)
GLUCOSE BLDC GLUCOMTR-MCNC: 191 MG/DL — HIGH (ref 70–99)
GLUCOSE BLDC GLUCOMTR-MCNC: 192 MG/DL — HIGH (ref 70–99)
GLUCOSE BLDC GLUCOMTR-MCNC: 192 MG/DL — HIGH (ref 70–99)
GLUCOSE BLDC GLUCOMTR-MCNC: 193 MG/DL — HIGH (ref 70–99)
GLUCOSE BLDC GLUCOMTR-MCNC: 195 MG/DL — HIGH (ref 70–99)
GLUCOSE BLDC GLUCOMTR-MCNC: 196 MG/DL — HIGH (ref 70–99)
GLUCOSE BLDC GLUCOMTR-MCNC: 197 MG/DL — HIGH (ref 70–99)
GLUCOSE BLDC GLUCOMTR-MCNC: 198 MG/DL — HIGH (ref 70–99)
GLUCOSE BLDC GLUCOMTR-MCNC: 199 MG/DL — HIGH (ref 70–99)
GLUCOSE BLDC GLUCOMTR-MCNC: 200 MG/DL — HIGH (ref 70–99)
GLUCOSE BLDC GLUCOMTR-MCNC: 201 MG/DL — HIGH (ref 70–99)
GLUCOSE BLDC GLUCOMTR-MCNC: 201 MG/DL — HIGH (ref 70–99)
GLUCOSE BLDC GLUCOMTR-MCNC: 202 MG/DL — HIGH (ref 70–99)
GLUCOSE BLDC GLUCOMTR-MCNC: 204 MG/DL — HIGH (ref 70–99)
GLUCOSE BLDC GLUCOMTR-MCNC: 204 MG/DL — HIGH (ref 70–99)
GLUCOSE BLDC GLUCOMTR-MCNC: 205 MG/DL — HIGH (ref 70–99)
GLUCOSE BLDC GLUCOMTR-MCNC: 206 MG/DL — HIGH (ref 70–99)
GLUCOSE BLDC GLUCOMTR-MCNC: 206 MG/DL — HIGH (ref 70–99)
GLUCOSE BLDC GLUCOMTR-MCNC: 207 MG/DL — HIGH (ref 70–99)
GLUCOSE BLDC GLUCOMTR-MCNC: 209 MG/DL — HIGH (ref 70–99)
GLUCOSE BLDC GLUCOMTR-MCNC: 209 MG/DL — HIGH (ref 70–99)
GLUCOSE BLDC GLUCOMTR-MCNC: 210 MG/DL — HIGH (ref 70–99)
GLUCOSE BLDC GLUCOMTR-MCNC: 210 MG/DL — HIGH (ref 70–99)
GLUCOSE BLDC GLUCOMTR-MCNC: 211 MG/DL — HIGH (ref 70–99)
GLUCOSE BLDC GLUCOMTR-MCNC: 212 MG/DL — HIGH (ref 70–99)
GLUCOSE BLDC GLUCOMTR-MCNC: 212 MG/DL — HIGH (ref 70–99)
GLUCOSE BLDC GLUCOMTR-MCNC: 217 MG/DL — HIGH (ref 70–99)
GLUCOSE BLDC GLUCOMTR-MCNC: 217 MG/DL — HIGH (ref 70–99)
GLUCOSE BLDC GLUCOMTR-MCNC: 218 MG/DL — HIGH (ref 70–99)
GLUCOSE BLDC GLUCOMTR-MCNC: 223 MG/DL — HIGH (ref 70–99)
GLUCOSE BLDC GLUCOMTR-MCNC: 225 MG/DL — HIGH (ref 70–99)
GLUCOSE BLDC GLUCOMTR-MCNC: 225 MG/DL — HIGH (ref 70–99)
GLUCOSE BLDC GLUCOMTR-MCNC: 230 MG/DL — HIGH (ref 70–99)
GLUCOSE BLDC GLUCOMTR-MCNC: 233 MG/DL — HIGH (ref 70–99)
GLUCOSE BLDC GLUCOMTR-MCNC: 233 MG/DL — HIGH (ref 70–99)
GLUCOSE BLDC GLUCOMTR-MCNC: 236 MG/DL — HIGH (ref 70–99)
GLUCOSE BLDC GLUCOMTR-MCNC: 238 MG/DL — HIGH (ref 70–99)
GLUCOSE BLDC GLUCOMTR-MCNC: 240 MG/DL — HIGH (ref 70–99)
GLUCOSE BLDC GLUCOMTR-MCNC: 246 MG/DL — HIGH (ref 70–99)
GLUCOSE BLDC GLUCOMTR-MCNC: 250 MG/DL — HIGH (ref 70–99)
GLUCOSE BLDC GLUCOMTR-MCNC: 250 MG/DL — HIGH (ref 70–99)
GLUCOSE BLDC GLUCOMTR-MCNC: 259 MG/DL — HIGH (ref 70–99)
GLUCOSE BLDC GLUCOMTR-MCNC: 266 MG/DL — HIGH (ref 70–99)
GLUCOSE BLDC GLUCOMTR-MCNC: 276 MG/DL — HIGH (ref 70–99)
GLUCOSE BLDC GLUCOMTR-MCNC: 293 MG/DL — HIGH (ref 70–99)
GLUCOSE BLDC GLUCOMTR-MCNC: 67 MG/DL — LOW (ref 70–99)
GLUCOSE BLDC GLUCOMTR-MCNC: 89 MG/DL — SIGNIFICANT CHANGE UP (ref 70–99)
GLUCOSE BLDC GLUCOMTR-MCNC: 91 MG/DL — SIGNIFICANT CHANGE UP (ref 70–99)
GLUCOSE BLDC GLUCOMTR-MCNC: 94 MG/DL — SIGNIFICANT CHANGE UP (ref 70–99)
GLUCOSE BLDC GLUCOMTR-MCNC: 95 MG/DL — SIGNIFICANT CHANGE UP (ref 70–99)
GLUCOSE BLDC GLUCOMTR-MCNC: 97 MG/DL — SIGNIFICANT CHANGE UP (ref 70–99)
GLUCOSE BLDC GLUCOMTR-MCNC: 97 MG/DL — SIGNIFICANT CHANGE UP (ref 70–99)
GLUCOSE SERPL-MCNC: 119 MG/DL — HIGH (ref 70–99)
GLUCOSE SERPL-MCNC: 126 MG/DL — HIGH (ref 70–99)
GLUCOSE SERPL-MCNC: 129 MG/DL — HIGH (ref 70–99)
GLUCOSE SERPL-MCNC: 131 MG/DL — HIGH (ref 70–99)
GLUCOSE SERPL-MCNC: 136 MG/DL — HIGH (ref 70–99)
GLUCOSE SERPL-MCNC: 144 MG/DL — HIGH (ref 70–99)
GLUCOSE SERPL-MCNC: 147 MG/DL — HIGH (ref 70–99)
GLUCOSE SERPL-MCNC: 151 MG/DL — HIGH (ref 70–99)
GLUCOSE SERPL-MCNC: 152 MG/DL — HIGH (ref 70–99)
GLUCOSE SERPL-MCNC: 153 MG/DL — HIGH (ref 70–99)
GLUCOSE SERPL-MCNC: 154 MG/DL — HIGH (ref 70–99)
GLUCOSE SERPL-MCNC: 156 MG/DL — HIGH (ref 70–99)
GLUCOSE SERPL-MCNC: 161 MG/DL — HIGH (ref 70–99)
GLUCOSE SERPL-MCNC: 164 MG/DL — HIGH (ref 70–99)
GLUCOSE SERPL-MCNC: 176 MG/DL — HIGH (ref 70–99)
GLUCOSE SERPL-MCNC: 177 MG/DL — HIGH (ref 70–99)
GLUCOSE SERPL-MCNC: 182 MG/DL — HIGH (ref 70–99)
GLUCOSE SERPL-MCNC: 186 MG/DL — HIGH (ref 70–99)
GLUCOSE SERPL-MCNC: 186 MG/DL — HIGH (ref 70–99)
GLUCOSE SERPL-MCNC: 188 MG/DL — HIGH (ref 70–99)
GLUCOSE SERPL-MCNC: 191 MG/DL — HIGH (ref 70–99)
GLUCOSE SERPL-MCNC: 192 MG/DL — HIGH (ref 70–99)
GLUCOSE SERPL-MCNC: 198 MG/DL — HIGH (ref 70–99)
GLUCOSE SERPL-MCNC: 199 MG/DL — HIGH (ref 70–99)
GLUCOSE SERPL-MCNC: 203 MG/DL — HIGH (ref 70–99)
GLUCOSE SERPL-MCNC: 206 MG/DL — HIGH (ref 70–99)
GLUCOSE SERPL-MCNC: 212 MG/DL — HIGH (ref 70–99)
GLUCOSE SERPL-MCNC: 214 MG/DL — HIGH (ref 70–99)
GLUCOSE SERPL-MCNC: 217 MG/DL — HIGH (ref 70–99)
GLUCOSE SERPL-MCNC: 221 MG/DL — HIGH (ref 70–99)
GLUCOSE SERPL-MCNC: 224 MG/DL — HIGH (ref 70–99)
GLUCOSE SERPL-MCNC: 253 MG/DL — HIGH (ref 70–99)
GLUCOSE SERPL-MCNC: 374 MG/DL — HIGH (ref 70–99)
GLUCOSE SERPL-MCNC: 88 MG/DL — SIGNIFICANT CHANGE UP (ref 70–99)
GLUCOSE UR QL: NEGATIVE — SIGNIFICANT CHANGE UP
HBA1C BLD-MCNC: 9.9 % — HIGH (ref 4–5.6)
HCO3 BLDA-SCNC: 23 MMOL/L — SIGNIFICANT CHANGE UP (ref 21–29)
HCO3 BLDA-SCNC: 24 MMOL/L — SIGNIFICANT CHANGE UP (ref 21–29)
HCO3 BLDA-SCNC: 24 MMOL/L — SIGNIFICANT CHANGE UP (ref 21–29)
HCO3 BLDA-SCNC: 26 MMOL/L — SIGNIFICANT CHANGE UP (ref 21–29)
HCO3 BLDA-SCNC: 26 MMOL/L — SIGNIFICANT CHANGE UP (ref 21–29)
HCO3 BLDA-SCNC: 29 MMOL/L — SIGNIFICANT CHANGE UP (ref 21–29)
HCO3 BLDV-SCNC: 28 MMOL/L — SIGNIFICANT CHANGE UP (ref 22–29)
HCT VFR BLD CALC: 22.7 % — LOW (ref 37–47)
HCT VFR BLD CALC: 23.3 % — LOW (ref 37–47)
HCT VFR BLD CALC: 23.4 % — LOW (ref 37–47)
HCT VFR BLD CALC: 23.7 % — LOW (ref 37–47)
HCT VFR BLD CALC: 24.1 % — LOW (ref 37–47)
HCT VFR BLD CALC: 24.2 % — LOW (ref 37–47)
HCT VFR BLD CALC: 26.5 % — LOW (ref 37–47)
HCT VFR BLD CALC: 27.2 % — LOW (ref 37–47)
HCT VFR BLD CALC: 27.6 % — LOW (ref 37–47)
HCT VFR BLD CALC: 27.7 % — LOW (ref 37–47)
HCT VFR BLD CALC: 27.7 % — LOW (ref 37–47)
HCT VFR BLD CALC: 27.9 % — LOW (ref 37–47)
HCT VFR BLD CALC: 27.9 % — LOW (ref 37–47)
HCT VFR BLD CALC: 28 % — LOW (ref 37–47)
HCT VFR BLD CALC: 29 % — LOW (ref 37–47)
HCT VFR BLD CALC: 29.4 % — LOW (ref 37–47)
HCT VFR BLD CALC: 29.8 % — LOW (ref 37–47)
HCT VFR BLD CALC: 30 % — LOW (ref 37–47)
HCT VFR BLD CALC: 30.5 % — LOW (ref 37–47)
HCT VFR BLD CALC: 30.9 % — LOW (ref 37–47)
HCT VFR BLD CALC: 31.4 % — LOW (ref 37–47)
HCT VFR BLD CALC: 31.8 % — LOW (ref 37–47)
HCT VFR BLD CALC: 31.9 % — LOW (ref 37–47)
HCT VFR BLD CALC: 32.7 % — LOW (ref 37–47)
HCT VFR BLD CALC: 34.2 % — LOW (ref 37–47)
HCT VFR BLD CALC: 35.4 % — LOW (ref 37–47)
HCT VFR BLD CALC: 35.6 % — LOW (ref 37–47)
HCT VFR BLD CALC: 38.4 % — SIGNIFICANT CHANGE UP (ref 37–47)
HCT VFR BLDA CALC: 24.2 % — LOW (ref 34–44)
HCV AB S/CO SERPL IA: 0.03 COI — SIGNIFICANT CHANGE UP
HCV AB SERPL-IMP: SIGNIFICANT CHANGE UP
HGB BLD CALC-MCNC: 7.9 G/DL — LOW (ref 14–18)
HGB BLD-MCNC: 10 G/DL — LOW (ref 12–16)
HGB BLD-MCNC: 10 G/DL — LOW (ref 12–16)
HGB BLD-MCNC: 10.1 G/DL — LOW (ref 12–16)
HGB BLD-MCNC: 10.4 G/DL — LOW (ref 12–16)
HGB BLD-MCNC: 10.6 G/DL — LOW (ref 12–16)
HGB BLD-MCNC: 10.7 G/DL — LOW (ref 12–16)
HGB BLD-MCNC: 11 G/DL — LOW (ref 12–16)
HGB BLD-MCNC: 11.4 G/DL — LOW (ref 12–16)
HGB BLD-MCNC: 11.6 G/DL — LOW (ref 12–16)
HGB BLD-MCNC: 11.9 G/DL — LOW (ref 12–16)
HGB BLD-MCNC: 12.6 G/DL — SIGNIFICANT CHANGE UP (ref 12–16)
HGB BLD-MCNC: 6.6 G/DL — CRITICAL LOW (ref 12–16)
HGB BLD-MCNC: 7.5 G/DL — LOW (ref 12–16)
HGB BLD-MCNC: 7.6 G/DL — LOW (ref 12–16)
HGB BLD-MCNC: 7.7 G/DL — LOW (ref 12–16)
HGB BLD-MCNC: 8.2 G/DL — LOW (ref 12–16)
HGB BLD-MCNC: 8.3 G/DL — LOW (ref 12–16)
HGB BLD-MCNC: 8.7 G/DL — LOW (ref 12–16)
HGB BLD-MCNC: 8.9 G/DL — LOW (ref 12–16)
HGB BLD-MCNC: 9.1 G/DL — LOW (ref 12–16)
HGB BLD-MCNC: 9.1 G/DL — LOW (ref 12–16)
HGB BLD-MCNC: 9.2 G/DL — LOW (ref 12–16)
HGB BLD-MCNC: 9.3 G/DL — LOW (ref 12–16)
HGB BLD-MCNC: 9.4 G/DL — LOW (ref 12–16)
HGB BLD-MCNC: 9.5 G/DL — LOW (ref 12–16)
HGB BLD-MCNC: 9.5 G/DL — LOW (ref 12–16)
HGB BLD-MCNC: 9.6 G/DL — LOW (ref 12–16)
HGB BLD-MCNC: 9.7 G/DL — LOW (ref 12–16)
HOROWITZ INDEX BLDA+IHG-RTO: 21 — SIGNIFICANT CHANGE UP
HOROWITZ INDEX BLDA+IHG-RTO: 50 — SIGNIFICANT CHANGE UP
HOROWITZ INDEX BLDA+IHG-RTO: 50 — SIGNIFICANT CHANGE UP
HOROWITZ INDEX BLDA+IHG-RTO: 60 — SIGNIFICANT CHANGE UP
HYALINE CASTS # UR AUTO: 5 /LPF — SIGNIFICANT CHANGE UP (ref 0–7)
IGA FLD-MCNC: 197 MG/DL — SIGNIFICANT CHANGE UP (ref 84–499)
IGG FLD-MCNC: 1002 MG/DL — SIGNIFICANT CHANGE UP (ref 610–1660)
IGM SERPL-MCNC: 150 MG/DL — SIGNIFICANT CHANGE UP (ref 35–242)
IL10 SERPL-MCNC: 38 PG/ML — HIGH
IL12 SERPL-MCNC: <5 PG/ML — SIGNIFICANT CHANGE UP
IL13 SERPL-MCNC: <5 PG/ML — SIGNIFICANT CHANGE UP
IL2 SERPL-MCNC: 2277 PG/ML — HIGH
IL2 SERPL-MCNC: <5 PG/ML — SIGNIFICANT CHANGE UP
IL4 SERPL-MCNC: <5 PG/ML — SIGNIFICANT CHANGE UP
IL6 SERPL-MCNC: 7 PG/ML — HIGH
IL8 SERPL-MCNC: <5 PG/ML — SIGNIFICANT CHANGE UP
IMM GRANULOCYTES NFR BLD AUTO: 0.4 % — HIGH (ref 0.1–0.3)
IMM GRANULOCYTES NFR BLD AUTO: 0.5 % — HIGH (ref 0.1–0.3)
IMM GRANULOCYTES NFR BLD AUTO: 0.7 % — HIGH (ref 0.1–0.3)
IMM GRANULOCYTES NFR BLD AUTO: 0.7 % — HIGH (ref 0.1–0.3)
IMM GRANULOCYTES NFR BLD AUTO: 0.9 % — HIGH (ref 0.1–0.3)
IMM GRANULOCYTES NFR BLD AUTO: 1.1 % — HIGH (ref 0.1–0.3)
IMM GRANULOCYTES NFR BLD AUTO: 1.2 % — HIGH (ref 0.1–0.3)
IMM GRANULOCYTES NFR BLD AUTO: 1.2 % — HIGH (ref 0.1–0.3)
IMM GRANULOCYTES NFR BLD AUTO: 1.3 % — HIGH (ref 0.1–0.3)
IMM GRANULOCYTES NFR BLD AUTO: 1.5 % — HIGH (ref 0.1–0.3)
IMM GRANULOCYTES NFR BLD AUTO: 1.6 % — HIGH (ref 0.1–0.3)
IMM GRANULOCYTES NFR BLD AUTO: 2 % — HIGH (ref 0.1–0.3)
IMM GRANULOCYTES NFR BLD AUTO: 2.4 % — HIGH (ref 0.1–0.3)
IMM GRANULOCYTES NFR BLD AUTO: 2.6 % — HIGH (ref 0.1–0.3)
IMM GRANULOCYTES NFR BLD AUTO: 2.7 % — HIGH (ref 0.1–0.3)
IMM GRANULOCYTES NFR BLD AUTO: 3.2 % — HIGH (ref 0.1–0.3)
INR BLD: 1.07 RATIO — SIGNIFICANT CHANGE UP (ref 0.65–1.3)
INR BLD: 1.14 RATIO — SIGNIFICANT CHANGE UP (ref 0.65–1.3)
INR BLD: 1.14 RATIO — SIGNIFICANT CHANGE UP (ref 0.65–1.3)
INR BLD: 1.2 RATIO — SIGNIFICANT CHANGE UP (ref 0.65–1.3)
INR BLD: 1.23 RATIO — SIGNIFICANT CHANGE UP (ref 0.65–1.3)
INR BLD: 1.24 RATIO — SIGNIFICANT CHANGE UP (ref 0.65–1.3)
INR BLD: 1.24 RATIO — SIGNIFICANT CHANGE UP (ref 0.65–1.3)
INR BLD: 1.28 RATIO — SIGNIFICANT CHANGE UP (ref 0.65–1.3)
INR BLD: 1.3 RATIO — SIGNIFICANT CHANGE UP (ref 0.65–1.3)
INR BLD: 1.32 RATIO — HIGH (ref 0.65–1.3)
INR BLD: 1.35 RATIO — HIGH (ref 0.65–1.3)
INR BLD: 1.43 RATIO — HIGH (ref 0.65–1.3)
INTERFERON GAMMA: <5 PG/ML — SIGNIFICANT CHANGE UP
INTERLEUKIN 1 BETA: <5 PG/ML — SIGNIFICANT CHANGE UP
INTERLEUKIN 17: <5 PG/ML — SIGNIFICANT CHANGE UP
INTERLEUKIN 5: <5 PG/ML — SIGNIFICANT CHANGE UP
KAPPA LC SER QL IFE: 6.13 MG/DL — HIGH (ref 0.33–1.94)
KAPPA/LAMBDA FREE LIGHT CHAIN RATIO, SERUM: 1.11 RATIO — SIGNIFICANT CHANGE UP (ref 0.26–1.65)
KETONES UR-MCNC: SIGNIFICANT CHANGE UP
LACTATE BLDV-MCNC: 1.6 MMOL/L — SIGNIFICANT CHANGE UP (ref 0.5–1.6)
LACTATE SERPL-SCNC: 1.6 MMOL/L — SIGNIFICANT CHANGE UP (ref 0.7–2)
LACTATE SERPL-SCNC: 12.5 MMOL/L — CRITICAL HIGH (ref 0.7–2)
LACTATE SERPL-SCNC: 2.7 MMOL/L — HIGH (ref 0.7–2)
LAMBDA LC SER QL IFE: 5.53 MG/DL — HIGH (ref 0.57–2.63)
LDH SERPL L TO P-CCNC: 220 — SIGNIFICANT CHANGE UP (ref 50–242)
LDH SERPL L TO P-CCNC: 411 — HIGH (ref 50–242)
LDH SERPL L TO P-CCNC: 495 — HIGH (ref 50–242)
LDH SERPL L TO P-CCNC: 527 — HIGH (ref 50–242)
LDH SERPL L TO P-CCNC: 546 — HIGH (ref 50–242)
LDH SERPL L TO P-CCNC: 755 — HIGH (ref 50–242)
LDH SERPL L TO P-CCNC: 899 — HIGH (ref 50–242)
LEUKOCYTE ESTERASE UR-ACNC: NEGATIVE — SIGNIFICANT CHANGE UP
LYMPHOCYTES # BLD AUTO: 0.08 K/UL — LOW (ref 1.2–3.4)
LYMPHOCYTES # BLD AUTO: 0.19 K/UL — LOW (ref 1.2–3.4)
LYMPHOCYTES # BLD AUTO: 0.21 K/UL — LOW (ref 1.2–3.4)
LYMPHOCYTES # BLD AUTO: 0.23 K/UL — LOW (ref 1.2–3.4)
LYMPHOCYTES # BLD AUTO: 0.26 K/UL — LOW (ref 1.2–3.4)
LYMPHOCYTES # BLD AUTO: 0.29 K/UL — LOW (ref 1.2–3.4)
LYMPHOCYTES # BLD AUTO: 0.32 K/UL — LOW (ref 1.2–3.4)
LYMPHOCYTES # BLD AUTO: 0.34 K/UL — LOW (ref 1.2–3.4)
LYMPHOCYTES # BLD AUTO: 0.35 K/UL — LOW (ref 1.2–3.4)
LYMPHOCYTES # BLD AUTO: 0.37 K/UL — LOW (ref 1.2–3.4)
LYMPHOCYTES # BLD AUTO: 0.39 K/UL — LOW (ref 1.2–3.4)
LYMPHOCYTES # BLD AUTO: 0.41 K/UL — LOW (ref 1.2–3.4)
LYMPHOCYTES # BLD AUTO: 0.44 K/UL — LOW (ref 1.2–3.4)
LYMPHOCYTES # BLD AUTO: 0.46 K/UL — LOW (ref 1.2–3.4)
LYMPHOCYTES # BLD AUTO: 0.46 K/UL — LOW (ref 1.2–3.4)
LYMPHOCYTES # BLD AUTO: 0.48 K/UL — LOW (ref 1.2–3.4)
LYMPHOCYTES # BLD AUTO: 0.52 K/UL — LOW (ref 1.2–3.4)
LYMPHOCYTES # BLD AUTO: 0.61 K/UL — LOW (ref 1.2–3.4)
LYMPHOCYTES # BLD AUTO: 0.62 K/UL — LOW (ref 1.2–3.4)
LYMPHOCYTES # BLD AUTO: 0.74 K/UL — LOW (ref 1.2–3.4)
LYMPHOCYTES # BLD AUTO: 0.89 K/UL — LOW (ref 1.2–3.4)
LYMPHOCYTES # BLD AUTO: 0.9 % — LOW (ref 20.5–51.1)
LYMPHOCYTES # BLD AUTO: 0.9 % — LOW (ref 20.5–51.1)
LYMPHOCYTES # BLD AUTO: 1 % — LOW (ref 20.5–51.1)
LYMPHOCYTES # BLD AUTO: 1 % — LOW (ref 20.5–51.1)
LYMPHOCYTES # BLD AUTO: 1.08 K/UL — LOW (ref 1.2–3.4)
LYMPHOCYTES # BLD AUTO: 1.12 K/UL — LOW (ref 1.2–3.4)
LYMPHOCYTES # BLD AUTO: 1.24 K/UL — SIGNIFICANT CHANGE UP (ref 1.2–3.4)
LYMPHOCYTES # BLD AUTO: 1.3 % — LOW (ref 20.5–51.1)
LYMPHOCYTES # BLD AUTO: 1.3 % — LOW (ref 20.5–51.1)
LYMPHOCYTES # BLD AUTO: 1.6 % — LOW (ref 20.5–51.1)
LYMPHOCYTES # BLD AUTO: 1.7 % — LOW (ref 20.5–51.1)
LYMPHOCYTES # BLD AUTO: 1.9 % — LOW (ref 20.5–51.1)
LYMPHOCYTES # BLD AUTO: 14.2 % — LOW (ref 20.5–51.1)
LYMPHOCYTES # BLD AUTO: 14.6 % — LOW (ref 20.5–51.1)
LYMPHOCYTES # BLD AUTO: 2 % — LOW (ref 20.5–51.1)
LYMPHOCYTES # BLD AUTO: 2.1 % — LOW (ref 20.5–51.1)
LYMPHOCYTES # BLD AUTO: 2.2 % — LOW (ref 20.5–51.1)
LYMPHOCYTES # BLD AUTO: 2.2 % — LOW (ref 20.5–51.1)
LYMPHOCYTES # BLD AUTO: 2.7 % — LOW (ref 20.5–51.1)
LYMPHOCYTES # BLD AUTO: 3 % — LOW (ref 20.5–51.1)
LYMPHOCYTES # BLD AUTO: 3.1 % — LOW (ref 20.5–51.1)
LYMPHOCYTES # BLD AUTO: 3.3 % — LOW (ref 20.5–51.1)
LYMPHOCYTES # BLD AUTO: 3.4 % — LOW (ref 20.5–51.1)
LYMPHOCYTES # BLD AUTO: 3.6 % — LOW (ref 20.5–51.1)
LYMPHOCYTES # BLD AUTO: 3.6 % — LOW (ref 20.5–51.1)
LYMPHOCYTES # BLD AUTO: 4 % — LOW (ref 20.5–51.1)
LYMPHOCYTES # BLD AUTO: 5.5 % — LOW (ref 20.5–51.1)
LYMPHOCYTES # BLD AUTO: 6.1 % — LOW (ref 20.5–51.1)
LYMPHOCYTES # BLD AUTO: 7.7 % — LOW (ref 20.5–51.1)
MACROCYTES BLD QL: SLIGHT — SIGNIFICANT CHANGE UP
MACROCYTES BLD QL: SLIGHT — SIGNIFICANT CHANGE UP
MAGNESIUM SERPL-MCNC: 1.7 MG/DL — LOW (ref 1.8–2.4)
MAGNESIUM SERPL-MCNC: 1.8 MG/DL — SIGNIFICANT CHANGE UP (ref 1.8–2.4)
MAGNESIUM SERPL-MCNC: 1.9 MG/DL — SIGNIFICANT CHANGE UP (ref 1.8–2.4)
MAGNESIUM SERPL-MCNC: 1.9 MG/DL — SIGNIFICANT CHANGE UP (ref 1.8–2.4)
MAGNESIUM SERPL-MCNC: 2 MG/DL — SIGNIFICANT CHANGE UP (ref 1.8–2.4)
MAGNESIUM SERPL-MCNC: 2.1 MG/DL — SIGNIFICANT CHANGE UP (ref 1.8–2.4)
MAGNESIUM SERPL-MCNC: 2.1 MG/DL — SIGNIFICANT CHANGE UP (ref 1.8–2.4)
MAGNESIUM SERPL-MCNC: 2.2 MG/DL — SIGNIFICANT CHANGE UP (ref 1.8–2.4)
MAGNESIUM SERPL-MCNC: 2.3 MG/DL — SIGNIFICANT CHANGE UP (ref 1.8–2.4)
MAGNESIUM SERPL-MCNC: 2.4 MG/DL — SIGNIFICANT CHANGE UP (ref 1.8–2.4)
MAGNESIUM SERPL-MCNC: 2.4 MG/DL — SIGNIFICANT CHANGE UP (ref 1.8–2.4)
MAGNESIUM SERPL-MCNC: 2.5 MG/DL — HIGH (ref 1.8–2.4)
MAGNESIUM SERPL-MCNC: 2.5 MG/DL — HIGH (ref 1.8–2.4)
MAGNESIUM SERPL-MCNC: 2.6 MG/DL — HIGH (ref 1.8–2.4)
MAGNESIUM SERPL-MCNC: 2.6 MG/DL — HIGH (ref 1.8–2.4)
MAGNESIUM SERPL-MCNC: 2.7 MG/DL — HIGH (ref 1.8–2.4)
MAGNESIUM SERPL-MCNC: 2.8 MG/DL — HIGH (ref 1.8–2.4)
MAGNESIUM SERPL-MCNC: 2.8 MG/DL — HIGH (ref 1.8–2.4)
MAGNESIUM SERPL-MCNC: 2.9 MG/DL — HIGH (ref 1.8–2.4)
MAGNESIUM SERPL-MCNC: 2.9 MG/DL — HIGH (ref 1.8–2.4)
MANUAL SMEAR VERIFICATION: SIGNIFICANT CHANGE UP
MCHC RBC-ENTMCNC: 28.1 PG — SIGNIFICANT CHANGE UP (ref 27–31)
MCHC RBC-ENTMCNC: 28.2 PG — SIGNIFICANT CHANGE UP (ref 27–31)
MCHC RBC-ENTMCNC: 28.3 PG — SIGNIFICANT CHANGE UP (ref 27–31)
MCHC RBC-ENTMCNC: 28.6 PG — SIGNIFICANT CHANGE UP (ref 27–31)
MCHC RBC-ENTMCNC: 28.7 PG — SIGNIFICANT CHANGE UP (ref 27–31)
MCHC RBC-ENTMCNC: 28.8 PG — SIGNIFICANT CHANGE UP (ref 27–31)
MCHC RBC-ENTMCNC: 29.1 G/DL — LOW (ref 32–37)
MCHC RBC-ENTMCNC: 29.1 PG — SIGNIFICANT CHANGE UP (ref 27–31)
MCHC RBC-ENTMCNC: 29.2 PG — SIGNIFICANT CHANGE UP (ref 27–31)
MCHC RBC-ENTMCNC: 29.4 PG — SIGNIFICANT CHANGE UP (ref 27–31)
MCHC RBC-ENTMCNC: 29.5 PG — SIGNIFICANT CHANGE UP (ref 27–31)
MCHC RBC-ENTMCNC: 29.6 PG — SIGNIFICANT CHANGE UP (ref 27–31)
MCHC RBC-ENTMCNC: 29.7 PG — SIGNIFICANT CHANGE UP (ref 27–31)
MCHC RBC-ENTMCNC: 29.7 PG — SIGNIFICANT CHANGE UP (ref 27–31)
MCHC RBC-ENTMCNC: 29.8 PG — SIGNIFICANT CHANGE UP (ref 27–31)
MCHC RBC-ENTMCNC: 29.8 PG — SIGNIFICANT CHANGE UP (ref 27–31)
MCHC RBC-ENTMCNC: 29.9 PG — SIGNIFICANT CHANGE UP (ref 27–31)
MCHC RBC-ENTMCNC: 30.1 PG — SIGNIFICANT CHANGE UP (ref 27–31)
MCHC RBC-ENTMCNC: 30.1 PG — SIGNIFICANT CHANGE UP (ref 27–31)
MCHC RBC-ENTMCNC: 30.4 PG — SIGNIFICANT CHANGE UP (ref 27–31)
MCHC RBC-ENTMCNC: 31.8 G/DL — LOW (ref 32–37)
MCHC RBC-ENTMCNC: 32 G/DL — SIGNIFICANT CHANGE UP (ref 32–37)
MCHC RBC-ENTMCNC: 32.1 G/DL — SIGNIFICANT CHANGE UP (ref 32–37)
MCHC RBC-ENTMCNC: 32.1 G/DL — SIGNIFICANT CHANGE UP (ref 32–37)
MCHC RBC-ENTMCNC: 32.2 G/DL — SIGNIFICANT CHANGE UP (ref 32–37)
MCHC RBC-ENTMCNC: 32.4 G/DL — SIGNIFICANT CHANGE UP (ref 32–37)
MCHC RBC-ENTMCNC: 32.4 G/DL — SIGNIFICANT CHANGE UP (ref 32–37)
MCHC RBC-ENTMCNC: 32.6 G/DL — SIGNIFICANT CHANGE UP (ref 32–37)
MCHC RBC-ENTMCNC: 32.8 G/DL — SIGNIFICANT CHANGE UP (ref 32–37)
MCHC RBC-ENTMCNC: 32.9 G/DL — SIGNIFICANT CHANGE UP (ref 32–37)
MCHC RBC-ENTMCNC: 33 G/DL — SIGNIFICANT CHANGE UP (ref 32–37)
MCHC RBC-ENTMCNC: 33 G/DL — SIGNIFICANT CHANGE UP (ref 32–37)
MCHC RBC-ENTMCNC: 33.1 G/DL — SIGNIFICANT CHANGE UP (ref 32–37)
MCHC RBC-ENTMCNC: 33.2 G/DL — SIGNIFICANT CHANGE UP (ref 32–37)
MCHC RBC-ENTMCNC: 33.2 G/DL — SIGNIFICANT CHANGE UP (ref 32–37)
MCHC RBC-ENTMCNC: 33.3 G/DL — SIGNIFICANT CHANGE UP (ref 32–37)
MCHC RBC-ENTMCNC: 33.6 G/DL — SIGNIFICANT CHANGE UP (ref 32–37)
MCHC RBC-ENTMCNC: 33.9 G/DL — SIGNIFICANT CHANGE UP (ref 32–37)
MCHC RBC-ENTMCNC: 34 G/DL — SIGNIFICANT CHANGE UP (ref 32–37)
MCHC RBC-ENTMCNC: 34.1 G/DL — SIGNIFICANT CHANGE UP (ref 32–37)
MCHC RBC-ENTMCNC: 34.3 G/DL — SIGNIFICANT CHANGE UP (ref 32–37)
MCHC RBC-ENTMCNC: 34.4 G/DL — SIGNIFICANT CHANGE UP (ref 32–37)
MCV RBC AUTO: 100.4 FL — HIGH (ref 81–99)
MCV RBC AUTO: 86.3 FL — SIGNIFICANT CHANGE UP (ref 81–99)
MCV RBC AUTO: 86.4 FL — SIGNIFICANT CHANGE UP (ref 81–99)
MCV RBC AUTO: 86.5 FL — SIGNIFICANT CHANGE UP (ref 81–99)
MCV RBC AUTO: 86.5 FL — SIGNIFICANT CHANGE UP (ref 81–99)
MCV RBC AUTO: 86.6 FL — SIGNIFICANT CHANGE UP (ref 81–99)
MCV RBC AUTO: 87.1 FL — SIGNIFICANT CHANGE UP (ref 81–99)
MCV RBC AUTO: 87.1 FL — SIGNIFICANT CHANGE UP (ref 81–99)
MCV RBC AUTO: 87.2 FL — SIGNIFICANT CHANGE UP (ref 81–99)
MCV RBC AUTO: 87.3 FL — SIGNIFICANT CHANGE UP (ref 81–99)
MCV RBC AUTO: 87.4 FL — SIGNIFICANT CHANGE UP (ref 81–99)
MCV RBC AUTO: 87.4 FL — SIGNIFICANT CHANGE UP (ref 81–99)
MCV RBC AUTO: 87.5 FL — SIGNIFICANT CHANGE UP (ref 81–99)
MCV RBC AUTO: 87.5 FL — SIGNIFICANT CHANGE UP (ref 81–99)
MCV RBC AUTO: 87.6 FL — SIGNIFICANT CHANGE UP (ref 81–99)
MCV RBC AUTO: 87.7 FL — SIGNIFICANT CHANGE UP (ref 81–99)
MCV RBC AUTO: 87.7 FL — SIGNIFICANT CHANGE UP (ref 81–99)
MCV RBC AUTO: 88.3 FL — SIGNIFICANT CHANGE UP (ref 81–99)
MCV RBC AUTO: 88.5 FL — SIGNIFICANT CHANGE UP (ref 81–99)
MCV RBC AUTO: 88.6 FL — SIGNIFICANT CHANGE UP (ref 81–99)
MCV RBC AUTO: 88.9 FL — SIGNIFICANT CHANGE UP (ref 81–99)
MCV RBC AUTO: 89.1 FL — SIGNIFICANT CHANGE UP (ref 81–99)
MCV RBC AUTO: 89.1 FL — SIGNIFICANT CHANGE UP (ref 81–99)
MCV RBC AUTO: 89.5 FL — SIGNIFICANT CHANGE UP (ref 81–99)
MCV RBC AUTO: 89.6 FL — SIGNIFICANT CHANGE UP (ref 81–99)
MCV RBC AUTO: 91.7 FL — SIGNIFICANT CHANGE UP (ref 81–99)
MCV RBC AUTO: 92.5 FL — SIGNIFICANT CHANGE UP (ref 81–99)
MCV RBC AUTO: 93.3 FL — SIGNIFICANT CHANGE UP (ref 81–99)
METAMYELOCYTES # FLD: 1.7 % — HIGH (ref 0–0)
MONOCYTES # BLD AUTO: 0.08 K/UL — LOW (ref 0.1–0.6)
MONOCYTES # BLD AUTO: 0.16 K/UL — SIGNIFICANT CHANGE UP (ref 0.1–0.6)
MONOCYTES # BLD AUTO: 0.19 K/UL — SIGNIFICANT CHANGE UP (ref 0.1–0.6)
MONOCYTES # BLD AUTO: 0.27 K/UL — SIGNIFICANT CHANGE UP (ref 0.1–0.6)
MONOCYTES # BLD AUTO: 0.28 K/UL — SIGNIFICANT CHANGE UP (ref 0.1–0.6)
MONOCYTES # BLD AUTO: 0.33 K/UL — SIGNIFICANT CHANGE UP (ref 0.1–0.6)
MONOCYTES # BLD AUTO: 0.4 K/UL — SIGNIFICANT CHANGE UP (ref 0.1–0.6)
MONOCYTES # BLD AUTO: 0.4 K/UL — SIGNIFICANT CHANGE UP (ref 0.1–0.6)
MONOCYTES # BLD AUTO: 0.45 K/UL — SIGNIFICANT CHANGE UP (ref 0.1–0.6)
MONOCYTES # BLD AUTO: 0.48 K/UL — SIGNIFICANT CHANGE UP (ref 0.1–0.6)
MONOCYTES # BLD AUTO: 0.5 K/UL — SIGNIFICANT CHANGE UP (ref 0.1–0.6)
MONOCYTES # BLD AUTO: 0.53 K/UL — SIGNIFICANT CHANGE UP (ref 0.1–0.6)
MONOCYTES # BLD AUTO: 0.54 K/UL — SIGNIFICANT CHANGE UP (ref 0.1–0.6)
MONOCYTES # BLD AUTO: 0.54 K/UL — SIGNIFICANT CHANGE UP (ref 0.1–0.6)
MONOCYTES # BLD AUTO: 0.55 K/UL — SIGNIFICANT CHANGE UP (ref 0.1–0.6)
MONOCYTES # BLD AUTO: 0.55 K/UL — SIGNIFICANT CHANGE UP (ref 0.1–0.6)
MONOCYTES # BLD AUTO: 0.56 K/UL — SIGNIFICANT CHANGE UP (ref 0.1–0.6)
MONOCYTES # BLD AUTO: 0.59 K/UL — SIGNIFICANT CHANGE UP (ref 0.1–0.6)
MONOCYTES # BLD AUTO: 0.6 K/UL — SIGNIFICANT CHANGE UP (ref 0.1–0.6)
MONOCYTES # BLD AUTO: 0.64 K/UL — HIGH (ref 0.1–0.6)
MONOCYTES # BLD AUTO: 0.78 K/UL — HIGH (ref 0.1–0.6)
MONOCYTES NFR BLD AUTO: 0.8 % — LOW (ref 1.7–9.3)
MONOCYTES NFR BLD AUTO: 0.9 % — LOW (ref 1.7–9.3)
MONOCYTES NFR BLD AUTO: 1 % — LOW (ref 1.7–9.3)
MONOCYTES NFR BLD AUTO: 1.5 % — LOW (ref 1.7–9.3)
MONOCYTES NFR BLD AUTO: 1.7 % — SIGNIFICANT CHANGE UP (ref 1.7–9.3)
MONOCYTES NFR BLD AUTO: 1.8 % — SIGNIFICANT CHANGE UP (ref 1.7–9.3)
MONOCYTES NFR BLD AUTO: 1.8 % — SIGNIFICANT CHANGE UP (ref 1.7–9.3)
MONOCYTES NFR BLD AUTO: 2 % — SIGNIFICANT CHANGE UP (ref 1.7–9.3)
MONOCYTES NFR BLD AUTO: 2 % — SIGNIFICANT CHANGE UP (ref 1.7–9.3)
MONOCYTES NFR BLD AUTO: 2.2 % — SIGNIFICANT CHANGE UP (ref 1.7–9.3)
MONOCYTES NFR BLD AUTO: 2.4 % — SIGNIFICANT CHANGE UP (ref 1.7–9.3)
MONOCYTES NFR BLD AUTO: 2.7 % — SIGNIFICANT CHANGE UP (ref 1.7–9.3)
MONOCYTES NFR BLD AUTO: 3.2 % — SIGNIFICANT CHANGE UP (ref 1.7–9.3)
MONOCYTES NFR BLD AUTO: 3.2 % — SIGNIFICANT CHANGE UP (ref 1.7–9.3)
MONOCYTES NFR BLD AUTO: 3.3 % — SIGNIFICANT CHANGE UP (ref 1.7–9.3)
MONOCYTES NFR BLD AUTO: 3.5 % — SIGNIFICANT CHANGE UP (ref 1.7–9.3)
MONOCYTES NFR BLD AUTO: 3.5 % — SIGNIFICANT CHANGE UP (ref 1.7–9.3)
MONOCYTES NFR BLD AUTO: 3.7 % — SIGNIFICANT CHANGE UP (ref 1.7–9.3)
MONOCYTES NFR BLD AUTO: 3.7 % — SIGNIFICANT CHANGE UP (ref 1.7–9.3)
MONOCYTES NFR BLD AUTO: 4.2 % — SIGNIFICANT CHANGE UP (ref 1.7–9.3)
MONOCYTES NFR BLD AUTO: 4.7 % — SIGNIFICANT CHANGE UP (ref 1.7–9.3)
MONOCYTES NFR BLD AUTO: 5.1 % — SIGNIFICANT CHANGE UP (ref 1.7–9.3)
MONOCYTES NFR BLD AUTO: 5.3 % — SIGNIFICANT CHANGE UP (ref 1.7–9.3)
MONOCYTES NFR BLD AUTO: 5.7 % — SIGNIFICANT CHANGE UP (ref 1.7–9.3)
MONOCYTES NFR BLD AUTO: 6.9 % — SIGNIFICANT CHANGE UP (ref 1.7–9.3)
MONOCYTES NFR BLD AUTO: 7.9 % — SIGNIFICANT CHANGE UP (ref 1.7–9.3)
MRSA PCR RESULT.: NEGATIVE — SIGNIFICANT CHANGE UP
NEUTROPHILS # BLD AUTO: 10.03 K/UL — HIGH (ref 1.4–6.5)
NEUTROPHILS # BLD AUTO: 10.03 K/UL — HIGH (ref 1.4–6.5)
NEUTROPHILS # BLD AUTO: 10.13 K/UL — HIGH (ref 1.4–6.5)
NEUTROPHILS # BLD AUTO: 11.76 K/UL — HIGH (ref 1.4–6.5)
NEUTROPHILS # BLD AUTO: 11.83 K/UL — HIGH (ref 1.4–6.5)
NEUTROPHILS # BLD AUTO: 12 K/UL — HIGH (ref 1.4–6.5)
NEUTROPHILS # BLD AUTO: 13.16 K/UL — HIGH (ref 1.4–6.5)
NEUTROPHILS # BLD AUTO: 14.28 K/UL — HIGH (ref 1.4–6.5)
NEUTROPHILS # BLD AUTO: 15.25 K/UL — HIGH (ref 1.4–6.5)
NEUTROPHILS # BLD AUTO: 15.63 K/UL — HIGH (ref 1.4–6.5)
NEUTROPHILS # BLD AUTO: 15.8 K/UL — HIGH (ref 1.4–6.5)
NEUTROPHILS # BLD AUTO: 15.86 K/UL — HIGH (ref 1.4–6.5)
NEUTROPHILS # BLD AUTO: 18.22 K/UL — HIGH (ref 1.4–6.5)
NEUTROPHILS # BLD AUTO: 19.09 K/UL — HIGH (ref 1.4–6.5)
NEUTROPHILS # BLD AUTO: 20.45 K/UL — HIGH (ref 1.4–6.5)
NEUTROPHILS # BLD AUTO: 21.29 K/UL — HIGH (ref 1.4–6.5)
NEUTROPHILS # BLD AUTO: 22.15 K/UL — HIGH (ref 1.4–6.5)
NEUTROPHILS # BLD AUTO: 22.18 K/UL — HIGH (ref 1.4–6.5)
NEUTROPHILS # BLD AUTO: 25.6 K/UL — HIGH (ref 1.4–6.5)
NEUTROPHILS # BLD AUTO: 26.6 K/UL — HIGH (ref 1.4–6.5)
NEUTROPHILS # BLD AUTO: 26.73 K/UL — HIGH (ref 1.4–6.5)
NEUTROPHILS # BLD AUTO: 6.14 K/UL — SIGNIFICANT CHANGE UP (ref 1.4–6.5)
NEUTROPHILS # BLD AUTO: 6.7 K/UL — HIGH (ref 1.4–6.5)
NEUTROPHILS # BLD AUTO: 8.43 K/UL — HIGH (ref 1.4–6.5)
NEUTROPHILS # BLD AUTO: 9.01 K/UL — HIGH (ref 1.4–6.5)
NEUTROPHILS # BLD AUTO: 9.43 K/UL — HIGH (ref 1.4–6.5)
NEUTROPHILS NFR BLD AUTO: 78.1 % — HIGH (ref 42.2–75.2)
NEUTROPHILS NFR BLD AUTO: 79.1 % — HIGH (ref 42.2–75.2)
NEUTROPHILS NFR BLD AUTO: 85 % — HIGH (ref 42.2–75.2)
NEUTROPHILS NFR BLD AUTO: 86.9 % — HIGH (ref 42.2–75.2)
NEUTROPHILS NFR BLD AUTO: 89.1 % — HIGH (ref 42.2–75.2)
NEUTROPHILS NFR BLD AUTO: 89.6 % — HIGH (ref 42.2–75.2)
NEUTROPHILS NFR BLD AUTO: 89.7 % — HIGH (ref 42.2–75.2)
NEUTROPHILS NFR BLD AUTO: 90.3 % — HIGH (ref 42.2–75.2)
NEUTROPHILS NFR BLD AUTO: 90.9 % — HIGH (ref 42.2–75.2)
NEUTROPHILS NFR BLD AUTO: 91.2 % — HIGH (ref 42.2–75.2)
NEUTROPHILS NFR BLD AUTO: 91.4 % — HIGH (ref 42.2–75.2)
NEUTROPHILS NFR BLD AUTO: 91.7 % — HIGH (ref 42.2–75.2)
NEUTROPHILS NFR BLD AUTO: 92.1 % — HIGH (ref 42.2–75.2)
NEUTROPHILS NFR BLD AUTO: 92.4 % — HIGH (ref 42.2–75.2)
NEUTROPHILS NFR BLD AUTO: 92.8 % — HIGH (ref 42.2–75.2)
NEUTROPHILS NFR BLD AUTO: 93.1 % — HIGH (ref 42.2–75.2)
NEUTROPHILS NFR BLD AUTO: 94.2 % — HIGH (ref 42.2–75.2)
NEUTROPHILS NFR BLD AUTO: 94.3 % — HIGH (ref 42.2–75.2)
NEUTROPHILS NFR BLD AUTO: 94.6 % — HIGH (ref 42.2–75.2)
NEUTROPHILS NFR BLD AUTO: 94.9 % — HIGH (ref 42.2–75.2)
NEUTROPHILS NFR BLD AUTO: 95 % — HIGH (ref 42.2–75.2)
NEUTROPHILS NFR BLD AUTO: 95.1 % — HIGH (ref 42.2–75.2)
NEUTROPHILS NFR BLD AUTO: 95.3 % — HIGH (ref 42.2–75.2)
NEUTROPHILS NFR BLD AUTO: 95.6 % — HIGH (ref 42.2–75.2)
NEUTROPHILS NFR BLD AUTO: 95.9 % — HIGH (ref 42.2–75.2)
NEUTROPHILS NFR BLD AUTO: 96.3 % — HIGH (ref 42.2–75.2)
NEUTS BAND # BLD: 4.3 % — SIGNIFICANT CHANGE UP (ref 0–6)
NITRITE UR-MCNC: NEGATIVE — SIGNIFICANT CHANGE UP
NRBC # BLD: 0 /100 WBCS — SIGNIFICANT CHANGE UP (ref 0–0)
NRBC # BLD: 0 /100 — SIGNIFICANT CHANGE UP (ref 0–0)
NRBC # BLD: 2 /100 — HIGH (ref 0–0)
NRBC # BLD: SIGNIFICANT CHANGE UP /100 WBCS (ref 0–0)
NRBC # BLD: SIGNIFICANT CHANGE UP /100 WBCS (ref 0–0)
NT-PROBNP SERPL-SCNC: 6463 PG/ML — HIGH (ref 0–300)
PCO2 BLDA: 39 MMHG — SIGNIFICANT CHANGE UP (ref 38–42)
PCO2 BLDA: 45 MMHG — HIGH (ref 38–42)
PCO2 BLDA: 47 MMHG — HIGH (ref 38–42)
PCO2 BLDA: 49 MMHG — HIGH (ref 38–42)
PCO2 BLDA: 51 MMHG — HIGH (ref 38–42)
PCO2 BLDA: 66 MMHG — CRITICAL HIGH (ref 38–42)
PCO2 BLDV: 47 MMHG — SIGNIFICANT CHANGE UP (ref 41–51)
PH BLDA: 7.25 — LOW (ref 7.38–7.42)
PH BLDA: 7.27 — LOW (ref 7.38–7.42)
PH BLDA: 7.3 — LOW (ref 7.38–7.42)
PH BLDA: 7.34 — LOW (ref 7.38–7.42)
PH BLDA: 7.37 — LOW (ref 7.38–7.42)
PH BLDA: 7.4 — SIGNIFICANT CHANGE UP (ref 7.38–7.42)
PH BLDV: 7.38 — SIGNIFICANT CHANGE UP (ref 7.26–7.43)
PH UR: 6 — SIGNIFICANT CHANGE UP (ref 5–8)
PHOSPHATE SERPL-MCNC: 12.1 MG/DL — HIGH (ref 2.1–4.9)
PHOSPHATE SERPL-MCNC: 14.2 MG/DL — HIGH (ref 2.1–4.9)
PHOSPHATE SERPL-MCNC: 2.8 MG/DL — SIGNIFICANT CHANGE UP (ref 2.1–4.9)
PHOSPHATE SERPL-MCNC: 3.2 MG/DL — SIGNIFICANT CHANGE UP (ref 2.1–4.9)
PHOSPHATE SERPL-MCNC: 3.3 MG/DL — SIGNIFICANT CHANGE UP (ref 2.1–4.9)
PHOSPHATE SERPL-MCNC: 3.3 MG/DL — SIGNIFICANT CHANGE UP (ref 2.1–4.9)
PHOSPHATE SERPL-MCNC: 3.4 MG/DL — SIGNIFICANT CHANGE UP (ref 2.1–4.9)
PHOSPHATE SERPL-MCNC: 3.6 MG/DL — SIGNIFICANT CHANGE UP (ref 2.1–4.9)
PHOSPHATE SERPL-MCNC: 3.6 MG/DL — SIGNIFICANT CHANGE UP (ref 2.1–4.9)
PHOSPHATE SERPL-MCNC: 3.7 MG/DL — SIGNIFICANT CHANGE UP (ref 2.1–4.9)
PHOSPHATE SERPL-MCNC: 3.8 MG/DL — SIGNIFICANT CHANGE UP (ref 2.1–4.9)
PHOSPHATE SERPL-MCNC: 3.9 MG/DL — SIGNIFICANT CHANGE UP (ref 2.1–4.9)
PHOSPHATE SERPL-MCNC: 4.2 MG/DL — SIGNIFICANT CHANGE UP (ref 2.1–4.9)
PHOSPHATE SERPL-MCNC: 5 MG/DL — HIGH (ref 2.1–4.9)
PHOSPHATE SERPL-MCNC: 6.4 MG/DL — HIGH (ref 2.1–4.9)
PHOSPHATE SERPL-MCNC: 6.5 MG/DL — HIGH (ref 2.1–4.9)
PHOSPHATE SERPL-MCNC: 6.7 MG/DL — HIGH (ref 2.1–4.9)
PHOSPHATE SERPL-MCNC: 7 MG/DL — HIGH (ref 2.1–4.9)
PHOSPHATE SERPL-MCNC: 7.2 MG/DL — HIGH (ref 2.1–4.9)
PHOSPHATE SERPL-MCNC: 7.4 MG/DL — HIGH (ref 2.1–4.9)
PHOSPHATE SERPL-MCNC: 8 MG/DL — HIGH (ref 2.1–4.9)
PHOSPHATE SERPL-MCNC: 8.4 MG/DL — HIGH (ref 2.1–4.9)
PHOSPHATE SERPL-MCNC: 8.5 MG/DL — HIGH (ref 2.1–4.9)
PHOSPHATE SERPL-MCNC: 8.7 MG/DL — HIGH (ref 2.1–4.9)
PHOSPHATE SERPL-MCNC: 8.9 MG/DL — HIGH (ref 2.1–4.9)
PLAT MORPH BLD: NORMAL — SIGNIFICANT CHANGE UP
PLATELET # BLD AUTO: 159 K/UL — SIGNIFICANT CHANGE UP (ref 130–400)
PLATELET # BLD AUTO: 163 K/UL — SIGNIFICANT CHANGE UP (ref 130–400)
PLATELET # BLD AUTO: 173 K/UL — SIGNIFICANT CHANGE UP (ref 130–400)
PLATELET # BLD AUTO: 186 K/UL — SIGNIFICANT CHANGE UP (ref 130–400)
PLATELET # BLD AUTO: 201 K/UL — SIGNIFICANT CHANGE UP (ref 130–400)
PLATELET # BLD AUTO: 207 K/UL — SIGNIFICANT CHANGE UP (ref 130–400)
PLATELET # BLD AUTO: 211 K/UL — SIGNIFICANT CHANGE UP (ref 130–400)
PLATELET # BLD AUTO: 212 K/UL — SIGNIFICANT CHANGE UP (ref 130–400)
PLATELET # BLD AUTO: 219 K/UL — SIGNIFICANT CHANGE UP (ref 130–400)
PLATELET # BLD AUTO: 228 K/UL — SIGNIFICANT CHANGE UP (ref 130–400)
PLATELET # BLD AUTO: 229 K/UL — SIGNIFICANT CHANGE UP (ref 130–400)
PLATELET # BLD AUTO: 237 K/UL — SIGNIFICANT CHANGE UP (ref 130–400)
PLATELET # BLD AUTO: 241 K/UL — SIGNIFICANT CHANGE UP (ref 130–400)
PLATELET # BLD AUTO: 247 K/UL — SIGNIFICANT CHANGE UP (ref 130–400)
PLATELET # BLD AUTO: 251 K/UL — SIGNIFICANT CHANGE UP (ref 130–400)
PLATELET # BLD AUTO: 259 K/UL — SIGNIFICANT CHANGE UP (ref 130–400)
PLATELET # BLD AUTO: 283 K/UL — SIGNIFICANT CHANGE UP (ref 130–400)
PLATELET # BLD AUTO: 284 K/UL — SIGNIFICANT CHANGE UP (ref 130–400)
PLATELET # BLD AUTO: 292 K/UL — SIGNIFICANT CHANGE UP (ref 130–400)
PLATELET # BLD AUTO: 315 K/UL — SIGNIFICANT CHANGE UP (ref 130–400)
PLATELET # BLD AUTO: 323 K/UL — SIGNIFICANT CHANGE UP (ref 130–400)
PLATELET # BLD AUTO: 336 K/UL — SIGNIFICANT CHANGE UP (ref 130–400)
PLATELET # BLD AUTO: 339 K/UL — SIGNIFICANT CHANGE UP (ref 130–400)
PLATELET # BLD AUTO: 358 K/UL — SIGNIFICANT CHANGE UP (ref 130–400)
PLATELET # BLD AUTO: 369 K/UL — SIGNIFICANT CHANGE UP (ref 130–400)
PLATELET # BLD AUTO: 484 K/UL — HIGH (ref 130–400)
PO2 BLDA: 54 MMHG — LOW (ref 78–95)
PO2 BLDA: 57 MMHG — LOW (ref 78–95)
PO2 BLDA: 61 MMHG — LOW (ref 78–95)
PO2 BLDA: 65 MMHG — LOW (ref 78–95)
PO2 BLDA: 69 MMHG — LOW (ref 78–95)
PO2 BLDA: 76 MMHG — LOW (ref 78–95)
PO2 BLDV: 24 MMHG — SIGNIFICANT CHANGE UP (ref 20–40)
POLYCHROMASIA BLD QL SMEAR: SLIGHT — SIGNIFICANT CHANGE UP
POTASSIUM BLDV-SCNC: 3.9 MMOL/L — SIGNIFICANT CHANGE UP (ref 3.3–5.6)
POTASSIUM SERPL-MCNC: 3.5 MMOL/L — SIGNIFICANT CHANGE UP (ref 3.5–5)
POTASSIUM SERPL-MCNC: 3.6 MMOL/L — SIGNIFICANT CHANGE UP (ref 3.5–5)
POTASSIUM SERPL-MCNC: 3.8 MMOL/L — SIGNIFICANT CHANGE UP (ref 3.5–5)
POTASSIUM SERPL-MCNC: 3.9 MMOL/L — SIGNIFICANT CHANGE UP (ref 3.5–5)
POTASSIUM SERPL-MCNC: 4 MMOL/L — SIGNIFICANT CHANGE UP (ref 3.5–5)
POTASSIUM SERPL-MCNC: 4 MMOL/L — SIGNIFICANT CHANGE UP (ref 3.5–5)
POTASSIUM SERPL-MCNC: 4.1 MMOL/L — SIGNIFICANT CHANGE UP (ref 3.5–5)
POTASSIUM SERPL-MCNC: 4.3 MMOL/L — SIGNIFICANT CHANGE UP (ref 3.5–5)
POTASSIUM SERPL-MCNC: 4.3 MMOL/L — SIGNIFICANT CHANGE UP (ref 3.5–5)
POTASSIUM SERPL-MCNC: 4.4 MMOL/L — SIGNIFICANT CHANGE UP (ref 3.5–5)
POTASSIUM SERPL-MCNC: 4.6 MMOL/L — SIGNIFICANT CHANGE UP (ref 3.5–5)
POTASSIUM SERPL-MCNC: 4.6 MMOL/L — SIGNIFICANT CHANGE UP (ref 3.5–5)
POTASSIUM SERPL-MCNC: 4.7 MMOL/L — SIGNIFICANT CHANGE UP (ref 3.5–5)
POTASSIUM SERPL-MCNC: 4.8 MMOL/L — SIGNIFICANT CHANGE UP (ref 3.5–5)
POTASSIUM SERPL-MCNC: 4.9 MMOL/L — SIGNIFICANT CHANGE UP (ref 3.5–5)
POTASSIUM SERPL-MCNC: 4.9 MMOL/L — SIGNIFICANT CHANGE UP (ref 3.5–5)
POTASSIUM SERPL-MCNC: 5.2 MMOL/L — HIGH (ref 3.5–5)
POTASSIUM SERPL-MCNC: 5.3 MMOL/L — HIGH (ref 3.5–5)
POTASSIUM SERPL-MCNC: 5.8 MMOL/L — HIGH (ref 3.5–5)
POTASSIUM SERPL-MCNC: 6.1 MMOL/L — CRITICAL HIGH (ref 3.5–5)
POTASSIUM SERPL-MCNC: 6.7 MMOL/L — CRITICAL HIGH (ref 3.5–5)
POTASSIUM SERPL-SCNC: 3.5 MMOL/L — SIGNIFICANT CHANGE UP (ref 3.5–5)
POTASSIUM SERPL-SCNC: 3.6 MMOL/L — SIGNIFICANT CHANGE UP (ref 3.5–5)
POTASSIUM SERPL-SCNC: 3.8 MMOL/L — SIGNIFICANT CHANGE UP (ref 3.5–5)
POTASSIUM SERPL-SCNC: 3.9 MMOL/L — SIGNIFICANT CHANGE UP (ref 3.5–5)
POTASSIUM SERPL-SCNC: 4 MMOL/L — SIGNIFICANT CHANGE UP (ref 3.5–5)
POTASSIUM SERPL-SCNC: 4 MMOL/L — SIGNIFICANT CHANGE UP (ref 3.5–5)
POTASSIUM SERPL-SCNC: 4.1 MMOL/L — SIGNIFICANT CHANGE UP (ref 3.5–5)
POTASSIUM SERPL-SCNC: 4.3 MMOL/L — SIGNIFICANT CHANGE UP (ref 3.5–5)
POTASSIUM SERPL-SCNC: 4.3 MMOL/L — SIGNIFICANT CHANGE UP (ref 3.5–5)
POTASSIUM SERPL-SCNC: 4.4 MMOL/L — SIGNIFICANT CHANGE UP (ref 3.5–5)
POTASSIUM SERPL-SCNC: 4.6 MMOL/L — SIGNIFICANT CHANGE UP (ref 3.5–5)
POTASSIUM SERPL-SCNC: 4.6 MMOL/L — SIGNIFICANT CHANGE UP (ref 3.5–5)
POTASSIUM SERPL-SCNC: 4.7 MMOL/L — SIGNIFICANT CHANGE UP (ref 3.5–5)
POTASSIUM SERPL-SCNC: 4.8 MMOL/L — SIGNIFICANT CHANGE UP (ref 3.5–5)
POTASSIUM SERPL-SCNC: 4.9 MMOL/L — SIGNIFICANT CHANGE UP (ref 3.5–5)
POTASSIUM SERPL-SCNC: 4.9 MMOL/L — SIGNIFICANT CHANGE UP (ref 3.5–5)
POTASSIUM SERPL-SCNC: 5.2 MMOL/L — HIGH (ref 3.5–5)
POTASSIUM SERPL-SCNC: 5.3 MMOL/L — HIGH (ref 3.5–5)
POTASSIUM SERPL-SCNC: 5.8 MMOL/L — HIGH (ref 3.5–5)
POTASSIUM SERPL-SCNC: 6.1 MMOL/L — CRITICAL HIGH (ref 3.5–5)
POTASSIUM SERPL-SCNC: 6.7 MMOL/L — CRITICAL HIGH (ref 3.5–5)
PROCALCITONIN SERPL-MCNC: 0.42 NG/ML — HIGH (ref 0.02–0.1)
PROCALCITONIN SERPL-MCNC: 0.48 NG/ML — HIGH (ref 0.02–0.1)
PROCALCITONIN SERPL-MCNC: 0.48 NG/ML — HIGH (ref 0.02–0.1)
PROCALCITONIN SERPL-MCNC: 0.51 NG/ML — HIGH (ref 0.02–0.1)
PROCALCITONIN SERPL-MCNC: 1.25 NG/ML — HIGH (ref 0.02–0.1)
PROCALCITONIN SERPL-MCNC: 1.69 NG/ML — HIGH (ref 0.02–0.1)
PROCALCITONIN SERPL-MCNC: 11.1 NG/ML — HIGH (ref 0.02–0.1)
PROCALCITONIN SERPL-MCNC: 12.2 NG/ML — HIGH (ref 0.02–0.1)
PROCALCITONIN SERPL-MCNC: 17.7 NG/ML — HIGH (ref 0.02–0.1)
PROCALCITONIN SERPL-MCNC: 2.13 NG/ML — HIGH (ref 0.02–0.1)
PROCALCITONIN SERPL-MCNC: 3.23 NG/ML — HIGH (ref 0.02–0.1)
PROCALCITONIN SERPL-MCNC: 3.66 NG/ML — HIGH (ref 0.02–0.1)
PROCALCITONIN SERPL-MCNC: 35.6 NG/ML — HIGH (ref 0.02–0.1)
PROCALCITONIN SERPL-MCNC: 40.7 NG/ML — HIGH (ref 0.02–0.1)
PROCALCITONIN SERPL-MCNC: 60.7 NG/ML — HIGH (ref 0.02–0.1)
PROCALCITONIN SERPL-MCNC: 62.9 NG/ML — HIGH (ref 0.02–0.1)
PROMYELOCYTES # FLD: 0.9 % — HIGH (ref 0–0)
PROT SERPL-MCNC: 3.8 G/DL — LOW (ref 6–8)
PROT SERPL-MCNC: 4 G/DL — LOW (ref 6–8)
PROT SERPL-MCNC: 4.3 G/DL — LOW (ref 6–8)
PROT SERPL-MCNC: 4.4 G/DL — LOW (ref 6–8)
PROT SERPL-MCNC: 4.4 G/DL — LOW (ref 6–8)
PROT SERPL-MCNC: 4.7 G/DL — LOW (ref 6–8)
PROT SERPL-MCNC: 4.8 G/DL — LOW (ref 6–8)
PROT SERPL-MCNC: 4.9 G/DL — LOW (ref 6–8)
PROT SERPL-MCNC: 4.9 G/DL — LOW (ref 6–8)
PROT SERPL-MCNC: 5 G/DL — LOW (ref 6–8)
PROT SERPL-MCNC: 5.2 G/DL — LOW (ref 6–8)
PROT SERPL-MCNC: 5.3 G/DL — LOW (ref 6–8)
PROT SERPL-MCNC: 5.4 G/DL — LOW (ref 6–8)
PROT SERPL-MCNC: 5.4 G/DL — LOW (ref 6–8)
PROT SERPL-MCNC: 5.5 G/DL — LOW (ref 6–8)
PROT SERPL-MCNC: 5.6 G/DL — LOW (ref 6–8)
PROT SERPL-MCNC: 5.8 G/DL — LOW (ref 6–8)
PROT SERPL-MCNC: 6 G/DL — SIGNIFICANT CHANGE UP (ref 6–8)
PROT SERPL-MCNC: 6 G/DL — SIGNIFICANT CHANGE UP (ref 6–8)
PROT SERPL-MCNC: 6.9 G/DL — SIGNIFICANT CHANGE UP (ref 6–8)
PROT UR-MCNC: ABNORMAL
PROTHROM AB SERPL-ACNC: 12.3 SEC — SIGNIFICANT CHANGE UP (ref 9.95–12.87)
PROTHROM AB SERPL-ACNC: 13.1 SEC — HIGH (ref 9.95–12.87)
PROTHROM AB SERPL-ACNC: 13.1 SEC — HIGH (ref 9.95–12.87)
PROTHROM AB SERPL-ACNC: 13.8 SEC — HIGH (ref 9.95–12.87)
PROTHROM AB SERPL-ACNC: 14.2 SEC — HIGH (ref 9.95–12.87)
PROTHROM AB SERPL-ACNC: 14.3 SEC — HIGH (ref 9.95–12.87)
PROTHROM AB SERPL-ACNC: 14.3 SEC — HIGH (ref 9.95–12.87)
PROTHROM AB SERPL-ACNC: 14.7 SEC — HIGH (ref 9.95–12.87)
PROTHROM AB SERPL-ACNC: 14.9 SEC — HIGH (ref 9.95–12.87)
PROTHROM AB SERPL-ACNC: 15.2 SEC — HIGH (ref 9.95–12.87)
PROTHROM AB SERPL-ACNC: 15.5 SEC — HIGH (ref 9.95–12.87)
PROTHROM AB SERPL-ACNC: 16.4 SEC — HIGH (ref 9.95–12.87)
RBC # BLD: 2.26 M/UL — LOW (ref 4.2–5.4)
RBC # BLD: 2.62 M/UL — LOW (ref 4.2–5.4)
RBC # BLD: 2.66 M/UL — LOW (ref 4.2–5.4)
RBC # BLD: 2.67 M/UL — LOW (ref 4.2–5.4)
RBC # BLD: 2.72 M/UL — LOW (ref 4.2–5.4)
RBC # BLD: 2.78 M/UL — LOW (ref 4.2–5.4)
RBC # BLD: 2.94 M/UL — LOW (ref 4.2–5.4)
RBC # BLD: 3.02 M/UL — LOW (ref 4.2–5.4)
RBC # BLD: 3.13 M/UL — LOW (ref 4.2–5.4)
RBC # BLD: 3.17 M/UL — LOW (ref 4.2–5.4)
RBC # BLD: 3.17 M/UL — LOW (ref 4.2–5.4)
RBC # BLD: 3.19 M/UL — LOW (ref 4.2–5.4)
RBC # BLD: 3.21 M/UL — LOW (ref 4.2–5.4)
RBC # BLD: 3.21 M/UL — LOW (ref 4.2–5.4)
RBC # BLD: 3.27 M/UL — LOW (ref 4.2–5.4)
RBC # BLD: 3.32 M/UL — LOW (ref 4.2–5.4)
RBC # BLD: 3.36 M/UL — LOW (ref 4.2–5.4)
RBC # BLD: 3.37 M/UL — LOW (ref 4.2–5.4)
RBC # BLD: 3.41 M/UL — LOW (ref 4.2–5.4)
RBC # BLD: 3.47 M/UL — LOW (ref 4.2–5.4)
RBC # BLD: 3.51 M/UL — LOW (ref 4.2–5.4)
RBC # BLD: 3.56 M/UL — LOW (ref 4.2–5.4)
RBC # BLD: 3.67 M/UL — LOW (ref 4.2–5.4)
RBC # BLD: 3.78 M/UL — LOW (ref 4.2–5.4)
RBC # BLD: 3.96 M/UL — LOW (ref 4.2–5.4)
RBC # BLD: 4 M/UL — LOW (ref 4.2–5.4)
RBC # BLD: 4.03 M/UL — LOW (ref 4.2–5.4)
RBC # BLD: 4.38 M/UL — SIGNIFICANT CHANGE UP (ref 4.2–5.4)
RBC # FLD: 12.1 % — SIGNIFICANT CHANGE UP (ref 11.5–14.5)
RBC # FLD: 12.2 % — SIGNIFICANT CHANGE UP (ref 11.5–14.5)
RBC # FLD: 12.7 % — SIGNIFICANT CHANGE UP (ref 11.5–14.5)
RBC # FLD: 13.1 % — SIGNIFICANT CHANGE UP (ref 11.5–14.5)
RBC # FLD: 13.2 % — SIGNIFICANT CHANGE UP (ref 11.5–14.5)
RBC # FLD: 13.3 % — SIGNIFICANT CHANGE UP (ref 11.5–14.5)
RBC # FLD: 13.5 % — SIGNIFICANT CHANGE UP (ref 11.5–14.5)
RBC # FLD: 13.7 % — SIGNIFICANT CHANGE UP (ref 11.5–14.5)
RBC # FLD: 13.7 % — SIGNIFICANT CHANGE UP (ref 11.5–14.5)
RBC # FLD: 13.8 % — SIGNIFICANT CHANGE UP (ref 11.5–14.5)
RBC # FLD: 13.8 % — SIGNIFICANT CHANGE UP (ref 11.5–14.5)
RBC # FLD: 13.9 % — SIGNIFICANT CHANGE UP (ref 11.5–14.5)
RBC # FLD: 14 % — SIGNIFICANT CHANGE UP (ref 11.5–14.5)
RBC # FLD: 14.2 % — SIGNIFICANT CHANGE UP (ref 11.5–14.5)
RBC # FLD: 14.4 % — SIGNIFICANT CHANGE UP (ref 11.5–14.5)
RBC # FLD: 14.8 % — HIGH (ref 11.5–14.5)
RBC # FLD: 15.1 % — HIGH (ref 11.5–14.5)
RBC # FLD: 15.3 % — HIGH (ref 11.5–14.5)
RBC # FLD: 15.4 % — HIGH (ref 11.5–14.5)
RBC # FLD: 15.4 % — HIGH (ref 11.5–14.5)
RBC # FLD: 15.5 % — HIGH (ref 11.5–14.5)
RBC # FLD: 15.5 % — HIGH (ref 11.5–14.5)
RBC # FLD: 15.7 % — HIGH (ref 11.5–14.5)
RBC # FLD: 16.3 % — HIGH (ref 11.5–14.5)
RBC BLD AUTO: ABNORMAL
RBC BLD AUTO: ABNORMAL
RBC BLD AUTO: NORMAL — SIGNIFICANT CHANGE UP
RBC CASTS # UR COMP ASSIST: 18 /HPF — HIGH (ref 0–4)
RSV RESULT: NEGATIVE — SIGNIFICANT CHANGE UP
RSV RNA RESP QL NAA+PROBE: NEGATIVE — SIGNIFICANT CHANGE UP
SAO2 % BLDA: 84 % — LOW (ref 92–96)
SAO2 % BLDA: 85 % — LOW (ref 92–96)
SAO2 % BLDA: 88 % — LOW (ref 92–96)
SAO2 % BLDA: 93 % — SIGNIFICANT CHANGE UP (ref 92–96)
SAO2 % BLDA: 93 % — SIGNIFICANT CHANGE UP (ref 92–96)
SAO2 % BLDA: 96 % — SIGNIFICANT CHANGE UP (ref 92–96)
SAO2 % BLDV: 40 % — SIGNIFICANT CHANGE UP
SARS-COV-2 RNA SPEC QL NAA+PROBE: DETECTED
SMUDGE CELLS # BLD: PRESENT — SIGNIFICANT CHANGE UP
SODIUM SERPL-SCNC: 130 MMOL/L — LOW (ref 135–146)
SODIUM SERPL-SCNC: 134 MMOL/L — LOW (ref 135–146)
SODIUM SERPL-SCNC: 135 MMOL/L — SIGNIFICANT CHANGE UP (ref 135–146)
SODIUM SERPL-SCNC: 137 MMOL/L — SIGNIFICANT CHANGE UP (ref 135–146)
SODIUM SERPL-SCNC: 137 MMOL/L — SIGNIFICANT CHANGE UP (ref 135–146)
SODIUM SERPL-SCNC: 138 MMOL/L — SIGNIFICANT CHANGE UP (ref 135–146)
SODIUM SERPL-SCNC: 138 MMOL/L — SIGNIFICANT CHANGE UP (ref 135–146)
SODIUM SERPL-SCNC: 139 MMOL/L — SIGNIFICANT CHANGE UP (ref 135–146)
SODIUM SERPL-SCNC: 140 MMOL/L — SIGNIFICANT CHANGE UP (ref 135–146)
SODIUM SERPL-SCNC: 141 MMOL/L — SIGNIFICANT CHANGE UP (ref 135–146)
SODIUM SERPL-SCNC: 142 MMOL/L — SIGNIFICANT CHANGE UP (ref 135–146)
SODIUM SERPL-SCNC: 143 MMOL/L — SIGNIFICANT CHANGE UP (ref 135–146)
SODIUM SERPL-SCNC: 144 MMOL/L — SIGNIFICANT CHANGE UP (ref 135–146)
SODIUM SERPL-SCNC: 145 MMOL/L — SIGNIFICANT CHANGE UP (ref 135–146)
SODIUM SERPL-SCNC: 145 MMOL/L — SIGNIFICANT CHANGE UP (ref 135–146)
SODIUM SERPL-SCNC: 146 MMOL/L — SIGNIFICANT CHANGE UP (ref 135–146)
SODIUM SERPL-SCNC: 146 MMOL/L — SIGNIFICANT CHANGE UP (ref 135–146)
SODIUM SERPL-SCNC: 147 MMOL/L — HIGH (ref 135–146)
SODIUM SERPL-SCNC: 148 MMOL/L — HIGH (ref 135–146)
SODIUM UR-SCNC: 44 MMOL/L — SIGNIFICANT CHANGE UP
SP GR SPEC: 1.02 — SIGNIFICANT CHANGE UP (ref 1.01–1.02)
SPECIMEN SOURCE: SIGNIFICANT CHANGE UP
T-CELL CD4 SUBSET PNL BLD: 54 /UL — LOW (ref 325–1251)
T-CELL CD4 SUBSET PNL BLD: 92 /UL — LOW (ref 325–1251)
TOXIC GRANULES BLD QL SMEAR: PRESENT — SIGNIFICANT CHANGE UP
TRIGL SERPL-MCNC: 95 MG/DL — SIGNIFICANT CHANGE UP (ref 10–149)
TROPONIN T SERPL-MCNC: 0.03 NG/ML — CRITICAL HIGH
TROPONIN T SERPL-MCNC: 0.04 NG/ML — CRITICAL HIGH
TROPONIN T SERPL-MCNC: 0.08 NG/ML — CRITICAL HIGH
TROPONIN T SERPL-MCNC: 0.09 NG/ML — CRITICAL HIGH
TROPONIN T SERPL-MCNC: 0.1 NG/ML — CRITICAL HIGH
TROPONIN T SERPL-MCNC: 0.1 NG/ML — CRITICAL HIGH
TROPONIN T SERPL-MCNC: <0.01 NG/ML — SIGNIFICANT CHANGE UP
TSH SERPL-MCNC: 2.62 UIU/ML — SIGNIFICANT CHANGE UP (ref 0.27–4.2)
UROBILINOGEN FLD QL: SIGNIFICANT CHANGE UP
VANCOMYCIN FLD-MCNC: 7.4 UG/ML — SIGNIFICANT CHANGE UP (ref 5–10)
VARIANT LYMPHS # BLD: 0.9 % — SIGNIFICANT CHANGE UP (ref 0–5)
VARIANT LYMPHS # BLD: 1.9 % — SIGNIFICANT CHANGE UP (ref 0–5)
WBC # BLD: 10.43 K/UL — SIGNIFICANT CHANGE UP (ref 4.8–10.8)
WBC # BLD: 10.64 K/UL — SIGNIFICANT CHANGE UP (ref 4.8–10.8)
WBC # BLD: 11.26 K/UL — HIGH (ref 4.8–10.8)
WBC # BLD: 11.54 K/UL — HIGH (ref 4.8–10.8)
WBC # BLD: 12.7 K/UL — HIGH (ref 4.8–10.8)
WBC # BLD: 12.93 K/UL — HIGH (ref 4.8–10.8)
WBC # BLD: 13.39 K/UL — HIGH (ref 4.8–10.8)
WBC # BLD: 14.44 K/UL — HIGH (ref 4.8–10.8)
WBC # BLD: 15.59 K/UL — HIGH (ref 4.8–10.8)
WBC # BLD: 16.5 K/UL — HIGH (ref 4.8–10.8)
WBC # BLD: 17.04 K/UL — HIGH (ref 4.8–10.8)
WBC # BLD: 17.08 K/UL — HIGH (ref 4.8–10.8)
WBC # BLD: 17.22 K/UL — HIGH (ref 4.8–10.8)
WBC # BLD: 17.64 K/UL — HIGH (ref 4.8–10.8)
WBC # BLD: 19.11 K/UL — HIGH (ref 4.8–10.8)
WBC # BLD: 19.73 K/UL — HIGH (ref 4.8–10.8)
WBC # BLD: 20.12 K/UL — HIGH (ref 4.8–10.8)
WBC # BLD: 21.62 K/UL — HIGH (ref 4.8–10.8)
WBC # BLD: 22.27 K/UL — HIGH (ref 4.8–10.8)
WBC # BLD: 23.56 K/UL — HIGH (ref 4.8–10.8)
WBC # BLD: 23.59 K/UL — HIGH (ref 4.8–10.8)
WBC # BLD: 26.95 K/UL — HIGH (ref 4.8–10.8)
WBC # BLD: 27.89 K/UL — HIGH (ref 4.8–10.8)
WBC # BLD: 28.2 K/UL — HIGH (ref 4.8–10.8)
WBC # BLD: 7.86 K/UL — SIGNIFICANT CHANGE UP (ref 4.8–10.8)
WBC # BLD: 8.47 K/UL — SIGNIFICANT CHANGE UP (ref 4.8–10.8)
WBC # BLD: 9.36 K/UL — SIGNIFICANT CHANGE UP (ref 4.8–10.8)
WBC # BLD: 9.92 K/UL — SIGNIFICANT CHANGE UP (ref 4.8–10.8)
WBC # FLD AUTO: 10.43 K/UL — SIGNIFICANT CHANGE UP (ref 4.8–10.8)
WBC # FLD AUTO: 10.64 K/UL — SIGNIFICANT CHANGE UP (ref 4.8–10.8)
WBC # FLD AUTO: 11.26 K/UL — HIGH (ref 4.8–10.8)
WBC # FLD AUTO: 11.54 K/UL — HIGH (ref 4.8–10.8)
WBC # FLD AUTO: 12.7 K/UL — HIGH (ref 4.8–10.8)
WBC # FLD AUTO: 12.93 K/UL — HIGH (ref 4.8–10.8)
WBC # FLD AUTO: 13.39 K/UL — HIGH (ref 4.8–10.8)
WBC # FLD AUTO: 14.44 K/UL — HIGH (ref 4.8–10.8)
WBC # FLD AUTO: 15.59 K/UL — HIGH (ref 4.8–10.8)
WBC # FLD AUTO: 16.5 K/UL — HIGH (ref 4.8–10.8)
WBC # FLD AUTO: 17.04 K/UL — HIGH (ref 4.8–10.8)
WBC # FLD AUTO: 17.08 K/UL — HIGH (ref 4.8–10.8)
WBC # FLD AUTO: 17.22 K/UL — HIGH (ref 4.8–10.8)
WBC # FLD AUTO: 17.64 K/UL — HIGH (ref 4.8–10.8)
WBC # FLD AUTO: 19.11 K/UL — HIGH (ref 4.8–10.8)
WBC # FLD AUTO: 19.73 K/UL — HIGH (ref 4.8–10.8)
WBC # FLD AUTO: 20.12 K/UL — HIGH (ref 4.8–10.8)
WBC # FLD AUTO: 21.62 K/UL — HIGH (ref 4.8–10.8)
WBC # FLD AUTO: 22.27 K/UL — HIGH (ref 4.8–10.8)
WBC # FLD AUTO: 23.56 K/UL — HIGH (ref 4.8–10.8)
WBC # FLD AUTO: 23.59 K/UL — HIGH (ref 4.8–10.8)
WBC # FLD AUTO: 26.95 K/UL — HIGH (ref 4.8–10.8)
WBC # FLD AUTO: 27.89 K/UL — HIGH (ref 4.8–10.8)
WBC # FLD AUTO: 28.2 K/UL — HIGH (ref 4.8–10.8)
WBC # FLD AUTO: 7.86 K/UL — SIGNIFICANT CHANGE UP (ref 4.8–10.8)
WBC # FLD AUTO: 8.47 K/UL — SIGNIFICANT CHANGE UP (ref 4.8–10.8)
WBC # FLD AUTO: 9.36 K/UL — SIGNIFICANT CHANGE UP (ref 4.8–10.8)
WBC # FLD AUTO: 9.92 K/UL — SIGNIFICANT CHANGE UP (ref 4.8–10.8)
WBC UR QL: 14 /HPF — HIGH (ref 0–5)

## 2020-01-01 PROCEDURE — 93010 ELECTROCARDIOGRAM REPORT: CPT

## 2020-01-01 PROCEDURE — 99291 CRITICAL CARE FIRST HOUR: CPT

## 2020-01-01 PROCEDURE — 71045 X-RAY EXAM CHEST 1 VIEW: CPT | Mod: 26

## 2020-01-01 PROCEDURE — 71045 X-RAY EXAM CHEST 1 VIEW: CPT | Mod: 26,77

## 2020-01-01 PROCEDURE — 93010 ELECTROCARDIOGRAM REPORT: CPT | Mod: 76

## 2020-01-01 PROCEDURE — 36620 INSERTION CATHETER ARTERY: CPT

## 2020-01-01 PROCEDURE — 71045 X-RAY EXAM CHEST 1 VIEW: CPT | Mod: 26,76

## 2020-01-01 PROCEDURE — 93010 ELECTROCARDIOGRAM REPORT: CPT | Mod: 77

## 2020-01-01 PROCEDURE — 70450 CT HEAD/BRAIN W/O DYE: CPT | Mod: 26

## 2020-01-01 PROCEDURE — 99291 CRITICAL CARE FIRST HOUR: CPT | Mod: 25

## 2020-01-01 PROCEDURE — 36569 INSJ PICC 5 YR+ W/O IMAGING: CPT

## 2020-01-01 PROCEDURE — 71045 X-RAY EXAM CHEST 1 VIEW: CPT | Mod: 26,76,77

## 2020-01-01 PROCEDURE — 36556 INSERT NON-TUNNEL CV CATH: CPT

## 2020-01-01 PROCEDURE — 99285 EMERGENCY DEPT VISIT HI MDM: CPT

## 2020-01-01 RX ORDER — HYDROXYCHLOROQUINE SULFATE 200 MG
400 TABLET ORAL EVERY 12 HOURS
Refills: 0 | Status: COMPLETED | OUTPATIENT
Start: 2020-01-01 | End: 2020-01-01

## 2020-01-01 RX ORDER — SODIUM CHLORIDE 9 MG/ML
250 INJECTION INTRAMUSCULAR; INTRAVENOUS; SUBCUTANEOUS
Refills: 0 | Status: DISCONTINUED | OUTPATIENT
Start: 2020-01-01 | End: 2020-01-01

## 2020-01-01 RX ORDER — INSULIN HUMAN 100 [IU]/ML
7 INJECTION, SOLUTION SUBCUTANEOUS ONCE
Refills: 0 | Status: COMPLETED | OUTPATIENT
Start: 2020-01-01 | End: 2020-01-01

## 2020-01-01 RX ORDER — NOREPINEPHRINE BITARTRATE/D5W 8 MG/250ML
0.05 PLASTIC BAG, INJECTION (ML) INTRAVENOUS
Qty: 16 | Refills: 0 | Status: DISCONTINUED | OUTPATIENT
Start: 2020-01-01 | End: 2020-01-01

## 2020-01-01 RX ORDER — PROPOFOL 10 MG/ML
5 INJECTION, EMULSION INTRAVENOUS
Qty: 1000 | Refills: 0 | Status: DISCONTINUED | OUTPATIENT
Start: 2020-01-01 | End: 2020-01-01

## 2020-01-01 RX ORDER — HEPARIN SODIUM 5000 [USP'U]/ML
5000 INJECTION INTRAVENOUS; SUBCUTANEOUS EVERY 8 HOURS
Refills: 0 | Status: DISCONTINUED | OUTPATIENT
Start: 2020-01-01 | End: 2020-01-01

## 2020-01-01 RX ORDER — SODIUM BICARBONATE 1 MEQ/ML
50 SYRINGE (ML) INTRAVENOUS ONCE
Refills: 0 | Status: COMPLETED | OUTPATIENT
Start: 2020-01-01 | End: 2020-01-01

## 2020-01-01 RX ORDER — AMLODIPINE BESYLATE 2.5 MG/1
5 TABLET ORAL ONCE
Refills: 0 | Status: COMPLETED | OUTPATIENT
Start: 2020-01-01 | End: 2020-01-01

## 2020-01-01 RX ORDER — HEPARIN SODIUM 5000 [USP'U]/ML
5500 INJECTION INTRAVENOUS; SUBCUTANEOUS EVERY 6 HOURS
Refills: 0 | Status: DISCONTINUED | OUTPATIENT
Start: 2020-01-01 | End: 2020-01-01

## 2020-01-01 RX ORDER — METOPROLOL TARTRATE 50 MG
10 TABLET ORAL ONCE
Refills: 0 | Status: COMPLETED | OUTPATIENT
Start: 2020-01-01 | End: 2020-01-01

## 2020-01-01 RX ORDER — SODIUM CHLORIDE 9 MG/ML
1000 INJECTION, SOLUTION INTRAVENOUS
Refills: 0 | Status: DISCONTINUED | OUTPATIENT
Start: 2020-01-01 | End: 2020-01-01

## 2020-01-01 RX ORDER — INSULIN LISPRO 100/ML
3 VIAL (ML) SUBCUTANEOUS
Refills: 0 | Status: DISCONTINUED | OUTPATIENT
Start: 2020-01-01 | End: 2020-01-01

## 2020-01-01 RX ORDER — GLYBURIDE 5 MG
1 TABLET ORAL
Qty: 0 | Refills: 0 | DISCHARGE

## 2020-01-01 RX ORDER — SODIUM BICARBONATE 1 MEQ/ML
0.09 SYRINGE (ML) INTRAVENOUS
Qty: 150 | Refills: 0 | Status: DISCONTINUED | OUTPATIENT
Start: 2020-01-01 | End: 2020-01-01

## 2020-01-01 RX ORDER — CHLORHEXIDINE GLUCONATE 213 G/1000ML
1 SOLUTION TOPICAL DAILY
Refills: 0 | Status: DISCONTINUED | OUTPATIENT
Start: 2020-01-01 | End: 2020-01-01

## 2020-01-01 RX ORDER — INSULIN HUMAN 100 [IU]/ML
1 INJECTION, SOLUTION SUBCUTANEOUS
Qty: 100 | Refills: 0 | Status: DISCONTINUED | OUTPATIENT
Start: 2020-01-01 | End: 2020-01-01

## 2020-01-01 RX ORDER — ATORVASTATIN CALCIUM 80 MG/1
40 TABLET, FILM COATED ORAL AT BEDTIME
Refills: 0 | Status: DISCONTINUED | OUTPATIENT
Start: 2020-01-01 | End: 2020-01-01

## 2020-01-01 RX ORDER — SEVELAMER CARBONATE 2400 MG/1
1600 POWDER, FOR SUSPENSION ORAL
Refills: 0 | Status: DISCONTINUED | OUTPATIENT
Start: 2020-01-01 | End: 2020-01-01

## 2020-01-01 RX ORDER — LISINOPRIL 2.5 MG/1
20 TABLET ORAL DAILY
Refills: 0 | Status: DISCONTINUED | OUTPATIENT
Start: 2020-01-01 | End: 2020-01-01

## 2020-01-01 RX ORDER — MEROPENEM 1 G/30ML
500 INJECTION INTRAVENOUS EVERY 12 HOURS
Refills: 0 | Status: DISCONTINUED | OUTPATIENT
Start: 2020-01-01 | End: 2020-01-01

## 2020-01-01 RX ORDER — CHLORHEXIDINE GLUCONATE 213 G/1000ML
15 SOLUTION TOPICAL
Refills: 0 | Status: DISCONTINUED | OUTPATIENT
Start: 2020-01-01 | End: 2020-01-01

## 2020-01-01 RX ORDER — SENNA PLUS 8.6 MG/1
2 TABLET ORAL AT BEDTIME
Refills: 0 | Status: DISCONTINUED | OUTPATIENT
Start: 2020-01-01 | End: 2020-01-01

## 2020-01-01 RX ORDER — SODIUM CHLORIDE 9 MG/ML
1000 INJECTION INTRAMUSCULAR; INTRAVENOUS; SUBCUTANEOUS
Refills: 0 | Status: DISCONTINUED | OUTPATIENT
Start: 2020-01-01 | End: 2020-01-01

## 2020-01-01 RX ORDER — CHLORHEXIDINE GLUCONATE 213 G/1000ML
15 SOLUTION TOPICAL EVERY 12 HOURS
Refills: 0 | Status: DISCONTINUED | OUTPATIENT
Start: 2020-01-01 | End: 2020-01-01

## 2020-01-01 RX ORDER — METOPROLOL TARTRATE 50 MG
2.5 TABLET ORAL ONCE
Refills: 0 | Status: COMPLETED | OUTPATIENT
Start: 2020-01-01 | End: 2020-01-01

## 2020-01-01 RX ORDER — METOPROLOL TARTRATE 50 MG
12.5 TABLET ORAL
Refills: 0 | Status: DISCONTINUED | OUTPATIENT
Start: 2020-01-01 | End: 2020-01-01

## 2020-01-01 RX ORDER — FUROSEMIDE 40 MG
40 TABLET ORAL ONCE
Refills: 0 | Status: COMPLETED | OUTPATIENT
Start: 2020-01-01 | End: 2020-01-01

## 2020-01-01 RX ORDER — CEFEPIME 1 G/1
2000 INJECTION, POWDER, FOR SOLUTION INTRAMUSCULAR; INTRAVENOUS EVERY 12 HOURS
Refills: 0 | Status: DISCONTINUED | OUTPATIENT
Start: 2020-01-01 | End: 2020-01-01

## 2020-01-01 RX ORDER — SODIUM CHLORIDE 9 MG/ML
500 INJECTION, SOLUTION INTRAVENOUS ONCE
Refills: 0 | Status: COMPLETED | OUTPATIENT
Start: 2020-01-01 | End: 2020-01-01

## 2020-01-01 RX ORDER — CALCIUM CHLORIDE
1000 POWDER (GRAM) MISCELLANEOUS ONCE
Refills: 0 | Status: COMPLETED | OUTPATIENT
Start: 2020-01-01 | End: 2020-01-01

## 2020-01-01 RX ORDER — POTASSIUM CHLORIDE 20 MEQ
20 PACKET (EA) ORAL ONCE
Refills: 0 | Status: COMPLETED | OUTPATIENT
Start: 2020-01-01 | End: 2020-01-01

## 2020-01-01 RX ORDER — VANCOMYCIN HCL 1 G
1000 VIAL (EA) INTRAVENOUS ONCE
Refills: 0 | Status: COMPLETED | OUTPATIENT
Start: 2020-01-01 | End: 2020-01-01

## 2020-01-01 RX ORDER — LACTULOSE 10 G/15ML
20 SOLUTION ORAL EVERY 12 HOURS
Refills: 0 | Status: DISCONTINUED | OUTPATIENT
Start: 2020-01-01 | End: 2020-01-01

## 2020-01-01 RX ORDER — CLOPIDOGREL BISULFATE 75 MG/1
75 TABLET, FILM COATED ORAL DAILY
Refills: 0 | Status: DISCONTINUED | OUTPATIENT
Start: 2020-01-01 | End: 2020-01-01

## 2020-01-01 RX ORDER — MORPHINE SULFATE 50 MG/1
2 CAPSULE, EXTENDED RELEASE ORAL
Qty: 100 | Refills: 0 | Status: DISCONTINUED | OUTPATIENT
Start: 2020-01-01 | End: 2020-01-01

## 2020-01-01 RX ORDER — DEXMEDETOMIDINE HYDROCHLORIDE IN 0.9% SODIUM CHLORIDE 4 UG/ML
0.2 INJECTION INTRAVENOUS
Qty: 200 | Refills: 0 | Status: DISCONTINUED | OUTPATIENT
Start: 2020-01-01 | End: 2020-01-01

## 2020-01-01 RX ORDER — LABETALOL HCL 100 MG
10 TABLET ORAL ONCE
Refills: 0 | Status: DISCONTINUED | OUTPATIENT
Start: 2020-01-01 | End: 2020-01-01

## 2020-01-01 RX ORDER — CALCIUM GLUCONATE 100 MG/ML
1 VIAL (ML) INTRAVENOUS ONCE
Refills: 0 | Status: COMPLETED | OUTPATIENT
Start: 2020-01-01 | End: 2020-01-01

## 2020-01-01 RX ORDER — HEPARIN SODIUM 5000 [USP'U]/ML
5500 INJECTION INTRAVENOUS; SUBCUTANEOUS ONCE
Refills: 0 | Status: COMPLETED | OUTPATIENT
Start: 2020-01-01 | End: 2020-01-01

## 2020-01-01 RX ORDER — NOREPINEPHRINE BITARTRATE/D5W 8 MG/250ML
0.05 PLASTIC BAG, INJECTION (ML) INTRAVENOUS
Qty: 8 | Refills: 0 | Status: DISCONTINUED | OUTPATIENT
Start: 2020-01-01 | End: 2020-01-01

## 2020-01-01 RX ORDER — FUROSEMIDE 40 MG
20 TABLET ORAL ONCE
Refills: 0 | Status: COMPLETED | OUTPATIENT
Start: 2020-01-01 | End: 2020-01-01

## 2020-01-01 RX ORDER — PHENYLEPHRINE HYDROCHLORIDE 10 MG/ML
0.1 INJECTION INTRAVENOUS
Qty: 160 | Refills: 0 | Status: DISCONTINUED | OUTPATIENT
Start: 2020-01-01 | End: 2020-01-01

## 2020-01-01 RX ORDER — AMIODARONE HYDROCHLORIDE 400 MG/1
1 TABLET ORAL
Qty: 900 | Refills: 0 | Status: DISCONTINUED | OUTPATIENT
Start: 2020-01-01 | End: 2020-01-01

## 2020-01-01 RX ORDER — AZITHROMYCIN 500 MG/1
500 TABLET, FILM COATED ORAL ONCE
Refills: 0 | Status: COMPLETED | OUTPATIENT
Start: 2020-01-01 | End: 2020-01-01

## 2020-01-01 RX ORDER — LABETALOL HCL 100 MG
50 TABLET ORAL EVERY 8 HOURS
Refills: 0 | Status: DISCONTINUED | OUTPATIENT
Start: 2020-01-01 | End: 2020-01-01

## 2020-01-01 RX ORDER — MIDAZOLAM HYDROCHLORIDE 1 MG/ML
0.02 INJECTION, SOLUTION INTRAMUSCULAR; INTRAVENOUS
Qty: 100 | Refills: 0 | Status: DISCONTINUED | OUTPATIENT
Start: 2020-01-01 | End: 2020-01-01

## 2020-01-01 RX ORDER — PROPOFOL 10 MG/ML
10 INJECTION, EMULSION INTRAVENOUS
Qty: 1000 | Refills: 0 | Status: DISCONTINUED | OUTPATIENT
Start: 2020-01-01 | End: 2020-01-01

## 2020-01-01 RX ORDER — AMIODARONE HYDROCHLORIDE 400 MG/1
1 TABLET ORAL
Qty: 900 | Refills: 0 | Status: COMPLETED | OUTPATIENT
Start: 2020-01-01 | End: 2020-01-01

## 2020-01-01 RX ORDER — EPINEPHRINE 0.3 MG/.3ML
0.01 INJECTION INTRAMUSCULAR; SUBCUTANEOUS
Qty: 8 | Refills: 0 | Status: DISCONTINUED | OUTPATIENT
Start: 2020-01-01 | End: 2020-01-01

## 2020-01-01 RX ORDER — PANTOPRAZOLE SODIUM 20 MG/1
40 TABLET, DELAYED RELEASE ORAL DAILY
Refills: 0 | Status: DISCONTINUED | OUTPATIENT
Start: 2020-01-01 | End: 2020-01-01

## 2020-01-01 RX ORDER — MIDAZOLAM HYDROCHLORIDE 1 MG/ML
2 INJECTION, SOLUTION INTRAMUSCULAR; INTRAVENOUS ONCE
Refills: 0 | Status: DISCONTINUED | OUTPATIENT
Start: 2020-01-01 | End: 2020-01-01

## 2020-01-01 RX ORDER — CLEVIDIPINE BUTYRATE 50MG/100ML
0.5 VIAL (ML) INTRAVENOUS
Qty: 25 | Refills: 0 | Status: DISCONTINUED | OUTPATIENT
Start: 2020-01-01 | End: 2020-01-01

## 2020-01-01 RX ORDER — METOPROLOL TARTRATE 50 MG
5 TABLET ORAL ONCE
Refills: 0 | Status: COMPLETED | OUTPATIENT
Start: 2020-01-01 | End: 2020-01-01

## 2020-01-01 RX ORDER — ACETAMINOPHEN 500 MG
650 TABLET ORAL EVERY 6 HOURS
Refills: 0 | Status: DISCONTINUED | OUTPATIENT
Start: 2020-01-01 | End: 2020-01-01

## 2020-01-01 RX ORDER — INSULIN LISPRO 100/ML
VIAL (ML) SUBCUTANEOUS
Refills: 0 | Status: DISCONTINUED | OUTPATIENT
Start: 2020-01-01 | End: 2020-01-01

## 2020-01-01 RX ORDER — INSULIN HUMAN 100 [IU]/ML
INJECTION, SOLUTION SUBCUTANEOUS EVERY 4 HOURS
Refills: 0 | Status: DISCONTINUED | OUTPATIENT
Start: 2020-01-01 | End: 2020-01-01

## 2020-01-01 RX ORDER — HEPARIN SODIUM 5000 [USP'U]/ML
INJECTION INTRAVENOUS; SUBCUTANEOUS
Qty: 25000 | Refills: 0 | Status: DISCONTINUED | OUTPATIENT
Start: 2020-01-01 | End: 2020-01-01

## 2020-01-01 RX ORDER — POTASSIUM PHOSPHATE, MONOBASIC POTASSIUM PHOSPHATE, DIBASIC 236; 224 MG/ML; MG/ML
15 INJECTION, SOLUTION INTRAVENOUS ONCE
Refills: 0 | Status: COMPLETED | OUTPATIENT
Start: 2020-01-01 | End: 2020-01-01

## 2020-01-01 RX ORDER — ASCORBIC ACID 60 MG
1000 TABLET,CHEWABLE ORAL
Refills: 0 | Status: DISCONTINUED | OUTPATIENT
Start: 2020-01-01 | End: 2020-01-01

## 2020-01-01 RX ORDER — HYDROXYCHLOROQUINE SULFATE 200 MG
TABLET ORAL
Refills: 0 | Status: COMPLETED | OUTPATIENT
Start: 2020-01-01 | End: 2020-01-01

## 2020-01-01 RX ORDER — INSULIN GLARGINE 100 [IU]/ML
10 INJECTION, SOLUTION SUBCUTANEOUS AT BEDTIME
Refills: 0 | Status: DISCONTINUED | OUTPATIENT
Start: 2020-01-01 | End: 2020-01-01

## 2020-01-01 RX ORDER — QUETIAPINE FUMARATE 200 MG/1
25 TABLET, FILM COATED ORAL DAILY
Refills: 0 | Status: DISCONTINUED | OUTPATIENT
Start: 2020-01-01 | End: 2020-01-01

## 2020-01-01 RX ORDER — AMIODARONE HYDROCHLORIDE 400 MG/1
150 TABLET ORAL ONCE
Refills: 0 | Status: COMPLETED | OUTPATIENT
Start: 2020-01-01 | End: 2020-01-01

## 2020-01-01 RX ORDER — LABETALOL HCL 100 MG
50 TABLET ORAL EVERY 12 HOURS
Refills: 0 | Status: DISCONTINUED | OUTPATIENT
Start: 2020-01-01 | End: 2020-01-01

## 2020-01-01 RX ORDER — METOPROLOL TARTRATE 50 MG
12 TABLET ORAL
Refills: 0 | Status: DISCONTINUED | OUTPATIENT
Start: 2020-01-01 | End: 2020-01-01

## 2020-01-01 RX ORDER — MEROPENEM 1 G/30ML
2 INJECTION INTRAVENOUS EVERY 6 HOURS
Refills: 0 | Status: DISCONTINUED | OUTPATIENT
Start: 2020-01-01 | End: 2020-01-01

## 2020-01-01 RX ORDER — AZITHROMYCIN 500 MG/1
250 TABLET, FILM COATED ORAL DAILY
Refills: 0 | Status: COMPLETED | OUTPATIENT
Start: 2020-01-01 | End: 2020-01-01

## 2020-01-01 RX ORDER — HYDROXYCHLOROQUINE SULFATE 200 MG
200 TABLET ORAL EVERY 12 HOURS
Refills: 0 | Status: COMPLETED | OUTPATIENT
Start: 2020-01-01 | End: 2020-01-01

## 2020-01-01 RX ORDER — LACTULOSE 10 G/15ML
10 SOLUTION ORAL
Refills: 0 | Status: DISCONTINUED | OUTPATIENT
Start: 2020-01-01 | End: 2020-01-01

## 2020-01-01 RX ORDER — CISATRACURIUM BESYLATE 2 MG/ML
10 INJECTION INTRAVENOUS ONCE
Refills: 0 | Status: COMPLETED | OUTPATIENT
Start: 2020-01-01 | End: 2020-01-01

## 2020-01-01 RX ORDER — ENOXAPARIN SODIUM 100 MG/ML
40 INJECTION SUBCUTANEOUS AT BEDTIME
Refills: 0 | Status: DISCONTINUED | OUTPATIENT
Start: 2020-01-01 | End: 2020-01-01

## 2020-01-01 RX ORDER — ROSUVASTATIN CALCIUM 5 MG/1
1 TABLET ORAL
Qty: 0 | Refills: 0 | DISCHARGE

## 2020-01-01 RX ORDER — SODIUM BICARBONATE 1 MEQ/ML
200 SYRINGE (ML) INTRAVENOUS ONCE
Refills: 0 | Status: COMPLETED | OUTPATIENT
Start: 2020-01-01 | End: 2020-01-01

## 2020-01-01 RX ORDER — MEROPENEM 1 G/30ML
1000 INJECTION INTRAVENOUS EVERY 12 HOURS
Refills: 0 | Status: DISCONTINUED | OUTPATIENT
Start: 2020-01-01 | End: 2020-01-01

## 2020-01-01 RX ORDER — LABETALOL HCL 100 MG
50 TABLET ORAL ONCE
Refills: 0 | Status: DISCONTINUED | OUTPATIENT
Start: 2020-01-01 | End: 2020-01-01

## 2020-01-01 RX ORDER — VANCOMYCIN HCL 1 G
1000 VIAL (EA) INTRAVENOUS ONCE
Refills: 0 | Status: DISCONTINUED | OUTPATIENT
Start: 2020-01-01 | End: 2020-01-01

## 2020-01-01 RX ORDER — ZINC SULFATE TAB 220 MG (50 MG ZINC EQUIVALENT) 220 (50 ZN) MG
220 TAB ORAL DAILY
Refills: 0 | Status: DISCONTINUED | OUTPATIENT
Start: 2020-01-01 | End: 2020-01-01

## 2020-01-01 RX ORDER — MAGNESIUM SULFATE 500 MG/ML
1 VIAL (ML) INJECTION ONCE
Refills: 0 | Status: COMPLETED | OUTPATIENT
Start: 2020-01-01 | End: 2020-01-01

## 2020-01-01 RX ORDER — SODIUM CHLORIDE 9 MG/ML
1000 INJECTION, SOLUTION INTRAVENOUS ONCE
Refills: 0 | Status: DISCONTINUED | OUTPATIENT
Start: 2020-01-01 | End: 2020-01-01

## 2020-01-01 RX ORDER — METOPROLOL TARTRATE 50 MG
12.5 TABLET ORAL EVERY 12 HOURS
Refills: 0 | Status: DISCONTINUED | OUTPATIENT
Start: 2020-01-01 | End: 2020-01-01

## 2020-01-01 RX ORDER — GUAIFENESIN/DEXTROMETHORPHAN 600MG-30MG
10 TABLET, EXTENDED RELEASE 12 HR ORAL EVERY 8 HOURS
Refills: 0 | Status: DISCONTINUED | OUTPATIENT
Start: 2020-01-01 | End: 2020-01-01

## 2020-01-01 RX ORDER — METFORMIN HYDROCHLORIDE 850 MG/1
1 TABLET ORAL
Qty: 0 | Refills: 0 | DISCHARGE

## 2020-01-01 RX ORDER — MORPHINE SULFATE 50 MG/1
4 CAPSULE, EXTENDED RELEASE ORAL ONCE
Refills: 0 | Status: DISCONTINUED | OUTPATIENT
Start: 2020-01-01 | End: 2020-01-01

## 2020-01-01 RX ORDER — MORPHINE SULFATE 50 MG/1
1 CAPSULE, EXTENDED RELEASE ORAL
Qty: 100 | Refills: 0 | Status: DISCONTINUED | OUTPATIENT
Start: 2020-01-01 | End: 2020-01-01

## 2020-01-01 RX ORDER — HEPARIN SODIUM 5000 [USP'U]/ML
2500 INJECTION INTRAVENOUS; SUBCUTANEOUS EVERY 6 HOURS
Refills: 0 | Status: DISCONTINUED | OUTPATIENT
Start: 2020-01-01 | End: 2020-01-01

## 2020-01-01 RX ORDER — MAGNESIUM SULFATE 500 MG/ML
2 VIAL (ML) INJECTION ONCE
Refills: 0 | Status: COMPLETED | OUTPATIENT
Start: 2020-01-01 | End: 2020-01-01

## 2020-01-01 RX ORDER — INSULIN HUMAN 100 [IU]/ML
INJECTION, SOLUTION SUBCUTANEOUS EVERY 6 HOURS
Refills: 0 | Status: DISCONTINUED | OUTPATIENT
Start: 2020-01-01 | End: 2020-01-01

## 2020-01-01 RX ORDER — NOREPINEPHRINE BITARTRATE/D5W 8 MG/250ML
0.05 PLASTIC BAG, INJECTION (ML) INTRAVENOUS
Qty: 32 | Refills: 0 | Status: DISCONTINUED | OUTPATIENT
Start: 2020-01-01 | End: 2020-01-01

## 2020-01-01 RX ORDER — VASOPRESSIN 20 [USP'U]/ML
0.04 INJECTION INTRAVENOUS
Qty: 50 | Refills: 0 | Status: DISCONTINUED | OUTPATIENT
Start: 2020-01-01 | End: 2020-01-01

## 2020-01-01 RX ORDER — SODIUM CHLORIDE 9 MG/ML
250 INJECTION, SOLUTION INTRAVENOUS ONCE
Refills: 0 | Status: COMPLETED | OUTPATIENT
Start: 2020-01-01 | End: 2020-01-01

## 2020-01-01 RX ORDER — DEXMEDETOMIDINE HYDROCHLORIDE IN 0.9% SODIUM CHLORIDE 4 UG/ML
68 INJECTION INTRAVENOUS ONCE
Refills: 0 | Status: DISCONTINUED | OUTPATIENT
Start: 2020-01-01 | End: 2020-01-01

## 2020-01-01 RX ORDER — METFORMIN HYDROCHLORIDE 850 MG/1
2 TABLET ORAL
Qty: 0 | Refills: 0 | DISCHARGE

## 2020-01-01 RX ORDER — PROPOFOL 10 MG/ML
68 INJECTION, EMULSION INTRAVENOUS
Qty: 1000 | Refills: 0 | Status: DISCONTINUED | OUTPATIENT
Start: 2020-01-01 | End: 2020-01-01

## 2020-01-01 RX ADMIN — HEPARIN SODIUM 5000 UNIT(S): 5000 INJECTION INTRAVENOUS; SUBCUTANEOUS at 12:38

## 2020-01-01 RX ADMIN — Medication 100 MILLIEQUIVALENT(S): at 06:21

## 2020-01-01 RX ADMIN — Medication 10 MILLILITER(S): at 21:37

## 2020-01-01 RX ADMIN — MORPHINE SULFATE 2 MG/HR: 50 CAPSULE, EXTENDED RELEASE ORAL at 06:00

## 2020-01-01 RX ADMIN — CLOPIDOGREL BISULFATE 75 MILLIGRAM(S): 75 TABLET, FILM COATED ORAL at 11:21

## 2020-01-01 RX ADMIN — SEVELAMER CARBONATE 1600 MILLIGRAM(S): 2400 POWDER, FOR SUSPENSION ORAL at 11:49

## 2020-01-01 RX ADMIN — SODIUM CHLORIDE 110 MILLILITER(S): 9 INJECTION, SOLUTION INTRAVENOUS at 18:05

## 2020-01-01 RX ADMIN — Medication 60 MILLIGRAM(S): at 05:12

## 2020-01-01 RX ADMIN — Medication 10 MILLILITER(S): at 05:09

## 2020-01-01 RX ADMIN — PANTOPRAZOLE SODIUM 40 MILLIGRAM(S): 20 TABLET, DELAYED RELEASE ORAL at 12:20

## 2020-01-01 RX ADMIN — ZINC SULFATE TAB 220 MG (50 MG ZINC EQUIVALENT) 220 MILLIGRAM(S): 220 (50 ZN) TAB at 12:34

## 2020-01-01 RX ADMIN — Medication 400 MILLIGRAM(S): at 14:33

## 2020-01-01 RX ADMIN — SODIUM CHLORIDE 60 MILLILITER(S): 9 INJECTION INTRAMUSCULAR; INTRAVENOUS; SUBCUTANEOUS at 05:52

## 2020-01-01 RX ADMIN — Medication 1000 MILLIGRAM(S): at 05:52

## 2020-01-01 RX ADMIN — MIDAZOLAM HYDROCHLORIDE 2 MILLIGRAM(S): 1 INJECTION, SOLUTION INTRAMUSCULAR; INTRAVENOUS at 05:03

## 2020-01-01 RX ADMIN — Medication 3.19 MICROGRAM(S)/KG/MIN: at 13:00

## 2020-01-01 RX ADMIN — SODIUM CHLORIDE 110 MILLILITER(S): 9 INJECTION, SOLUTION INTRAVENOUS at 23:00

## 2020-01-01 RX ADMIN — Medication 12.5 MILLIGRAM(S): at 05:53

## 2020-01-01 RX ADMIN — CEFEPIME 100 MILLIGRAM(S): 1 INJECTION, POWDER, FOR SOLUTION INTRAMUSCULAR; INTRAVENOUS at 16:38

## 2020-01-01 RX ADMIN — ENOXAPARIN SODIUM 40 MILLIGRAM(S): 100 INJECTION SUBCUTANEOUS at 22:18

## 2020-01-01 RX ADMIN — Medication 10 MILLILITER(S): at 22:35

## 2020-01-01 RX ADMIN — Medication 10 MILLILITER(S): at 13:01

## 2020-01-01 RX ADMIN — Medication 650 MILLIGRAM(S): at 06:03

## 2020-01-01 RX ADMIN — Medication 100 GRAM(S): at 03:36

## 2020-01-01 RX ADMIN — HEPARIN SODIUM 5000 UNIT(S): 5000 INJECTION INTRAVENOUS; SUBCUTANEOUS at 06:02

## 2020-01-01 RX ADMIN — Medication 200 MILLIEQUIVALENT(S): at 09:09

## 2020-01-01 RX ADMIN — Medication 50 MILLIGRAM(S): at 05:45

## 2020-01-01 RX ADMIN — Medication 10 MILLILITER(S): at 14:16

## 2020-01-01 RX ADMIN — SODIUM CHLORIDE 110 MILLILITER(S): 9 INJECTION, SOLUTION INTRAVENOUS at 06:00

## 2020-01-01 RX ADMIN — Medication 400 MILLIGRAM(S): at 01:31

## 2020-01-01 RX ADMIN — CHLORHEXIDINE GLUCONATE 15 MILLILITER(S): 213 SOLUTION TOPICAL at 19:32

## 2020-01-01 RX ADMIN — Medication 60 MILLIGRAM(S): at 17:00

## 2020-01-01 RX ADMIN — PANTOPRAZOLE SODIUM 40 MILLIGRAM(S): 20 TABLET, DELAYED RELEASE ORAL at 11:15

## 2020-01-01 RX ADMIN — Medication 12.5 MILLIGRAM(S): at 06:57

## 2020-01-01 RX ADMIN — HEPARIN SODIUM 5000 UNIT(S): 5000 INJECTION INTRAVENOUS; SUBCUTANEOUS at 13:11

## 2020-01-01 RX ADMIN — PANTOPRAZOLE SODIUM 40 MILLIGRAM(S): 20 TABLET, DELAYED RELEASE ORAL at 12:34

## 2020-01-01 RX ADMIN — Medication 650 MILLIGRAM(S): at 07:00

## 2020-01-01 RX ADMIN — ZINC SULFATE TAB 220 MG (50 MG ZINC EQUIVALENT) 220 MILLIGRAM(S): 220 (50 ZN) TAB at 17:13

## 2020-01-01 RX ADMIN — ENOXAPARIN SODIUM 40 MILLIGRAM(S): 100 INJECTION SUBCUTANEOUS at 22:05

## 2020-01-01 RX ADMIN — Medication 1000 MILLIGRAM(S): at 06:13

## 2020-01-01 RX ADMIN — CHLORHEXIDINE GLUCONATE 15 MILLILITER(S): 213 SOLUTION TOPICAL at 05:17

## 2020-01-01 RX ADMIN — Medication 10 MILLILITER(S): at 05:17

## 2020-01-01 RX ADMIN — HEPARIN SODIUM 5000 UNIT(S): 5000 INJECTION INTRAVENOUS; SUBCUTANEOUS at 20:34

## 2020-01-01 RX ADMIN — Medication 8 MICROGRAM(S)/KG/MIN: at 04:24

## 2020-01-01 RX ADMIN — CHLORHEXIDINE GLUCONATE 1 APPLICATION(S): 213 SOLUTION TOPICAL at 12:06

## 2020-01-01 RX ADMIN — ZINC SULFATE TAB 220 MG (50 MG ZINC EQUIVALENT) 220 MILLIGRAM(S): 220 (50 ZN) TAB at 11:50

## 2020-01-01 RX ADMIN — Medication 10 MILLILITER(S): at 00:43

## 2020-01-01 RX ADMIN — Medication 250 MILLIGRAM(S): at 17:50

## 2020-01-01 RX ADMIN — Medication 650 MILLIGRAM(S): at 16:37

## 2020-01-01 RX ADMIN — Medication 10 MILLILITER(S): at 21:26

## 2020-01-01 RX ADMIN — CHLORHEXIDINE GLUCONATE 15 MILLILITER(S): 213 SOLUTION TOPICAL at 05:05

## 2020-01-01 RX ADMIN — LACTULOSE 20 GRAM(S): 10 SOLUTION ORAL at 05:09

## 2020-01-01 RX ADMIN — Medication 10 MILLILITER(S): at 21:07

## 2020-01-01 RX ADMIN — MEROPENEM 100 MILLIGRAM(S): 1 INJECTION INTRAVENOUS at 17:18

## 2020-01-01 RX ADMIN — CHLORHEXIDINE GLUCONATE 1 APPLICATION(S): 213 SOLUTION TOPICAL at 11:15

## 2020-01-01 RX ADMIN — ZINC SULFATE TAB 220 MG (50 MG ZINC EQUIVALENT) 220 MILLIGRAM(S): 220 (50 ZN) TAB at 11:42

## 2020-01-01 RX ADMIN — Medication 200 MILLIGRAM(S): at 06:12

## 2020-01-01 RX ADMIN — POTASSIUM PHOSPHATE, MONOBASIC POTASSIUM PHOSPHATE, DIBASIC 63.75 MILLIMOLE(S): 236; 224 INJECTION, SOLUTION INTRAVENOUS at 22:30

## 2020-01-01 RX ADMIN — QUETIAPINE FUMARATE 25 MILLIGRAM(S): 200 TABLET, FILM COATED ORAL at 12:34

## 2020-01-01 RX ADMIN — Medication 10 MILLILITER(S): at 12:23

## 2020-01-01 RX ADMIN — Medication 5 MILLIGRAM(S): at 11:02

## 2020-01-01 RX ADMIN — Medication 10 MILLILITER(S): at 21:23

## 2020-01-01 RX ADMIN — Medication 3.19 MICROGRAM(S)/KG/MIN: at 06:00

## 2020-01-01 RX ADMIN — Medication 5 MILLIGRAM(S): at 12:30

## 2020-01-01 RX ADMIN — HEPARIN SODIUM 5000 UNIT(S): 5000 INJECTION INTRAVENOUS; SUBCUTANEOUS at 21:39

## 2020-01-01 RX ADMIN — Medication 8 MICROGRAM(S)/KG/MIN: at 10:00

## 2020-01-01 RX ADMIN — Medication 10 MILLILITER(S): at 14:18

## 2020-01-01 RX ADMIN — Medication 12.5 MILLIGRAM(S): at 05:10

## 2020-01-01 RX ADMIN — CHLORHEXIDINE GLUCONATE 15 MILLILITER(S): 213 SOLUTION TOPICAL at 05:58

## 2020-01-01 RX ADMIN — Medication 10 MILLILITER(S): at 17:15

## 2020-01-01 RX ADMIN — HEPARIN SODIUM 5000 UNIT(S): 5000 INJECTION INTRAVENOUS; SUBCUTANEOUS at 06:13

## 2020-01-01 RX ADMIN — PHENYLEPHRINE HYDROCHLORIDE 1.64 MICROGRAM(S)/KG/MIN: 10 INJECTION INTRAVENOUS at 03:15

## 2020-01-01 RX ADMIN — Medication 3: at 16:11

## 2020-01-01 RX ADMIN — CLOPIDOGREL BISULFATE 75 MILLIGRAM(S): 75 TABLET, FILM COATED ORAL at 10:45

## 2020-01-01 RX ADMIN — CLOPIDOGREL BISULFATE 75 MILLIGRAM(S): 75 TABLET, FILM COATED ORAL at 11:10

## 2020-01-01 RX ADMIN — MIDAZOLAM HYDROCHLORIDE 1.36 MG/KG/HR: 1 INJECTION, SOLUTION INTRAMUSCULAR; INTRAVENOUS at 20:26

## 2020-01-01 RX ADMIN — Medication 1000 MILLIGRAM(S): at 05:45

## 2020-01-01 RX ADMIN — SODIUM CHLORIDE 110 MILLILITER(S): 9 INJECTION, SOLUTION INTRAVENOUS at 20:24

## 2020-01-01 RX ADMIN — ATORVASTATIN CALCIUM 40 MILLIGRAM(S): 80 TABLET, FILM COATED ORAL at 21:32

## 2020-01-01 RX ADMIN — CEFEPIME 100 MILLIGRAM(S): 1 INJECTION, POWDER, FOR SOLUTION INTRAMUSCULAR; INTRAVENOUS at 17:57

## 2020-01-01 RX ADMIN — Medication 50 MILLIEQUIVALENT(S): at 19:33

## 2020-01-01 RX ADMIN — Medication 650 MILLIGRAM(S): at 06:05

## 2020-01-01 RX ADMIN — Medication 650 MILLIGRAM(S): at 17:07

## 2020-01-01 RX ADMIN — Medication 1000 MILLIGRAM(S): at 16:30

## 2020-01-01 RX ADMIN — Medication 250 MILLIGRAM(S): at 09:30

## 2020-01-01 RX ADMIN — SODIUM CHLORIDE 500 MILLILITER(S): 9 INJECTION, SOLUTION INTRAVENOUS at 06:02

## 2020-01-01 RX ADMIN — PANTOPRAZOLE SODIUM 40 MILLIGRAM(S): 20 TABLET, DELAYED RELEASE ORAL at 10:36

## 2020-01-01 RX ADMIN — CEFEPIME 100 MILLIGRAM(S): 1 INJECTION, POWDER, FOR SOLUTION INTRAMUSCULAR; INTRAVENOUS at 17:01

## 2020-01-01 RX ADMIN — CLOPIDOGREL BISULFATE 75 MILLIGRAM(S): 75 TABLET, FILM COATED ORAL at 12:20

## 2020-01-01 RX ADMIN — HEPARIN SODIUM 5000 UNIT(S): 5000 INJECTION INTRAVENOUS; SUBCUTANEOUS at 14:17

## 2020-01-01 RX ADMIN — CHLORHEXIDINE GLUCONATE 15 MILLILITER(S): 213 SOLUTION TOPICAL at 05:11

## 2020-01-01 RX ADMIN — Medication 60 MILLIGRAM(S): at 16:31

## 2020-01-01 RX ADMIN — ATORVASTATIN CALCIUM 40 MILLIGRAM(S): 80 TABLET, FILM COATED ORAL at 23:56

## 2020-01-01 RX ADMIN — ZINC SULFATE TAB 220 MG (50 MG ZINC EQUIVALENT) 220 MILLIGRAM(S): 220 (50 ZN) TAB at 12:20

## 2020-01-01 RX ADMIN — Medication 4 MICROGRAM(S)/KG/MIN: at 11:00

## 2020-01-01 RX ADMIN — Medication 12.5 MILLIGRAM(S): at 16:31

## 2020-01-01 RX ADMIN — Medication 650 MILLIGRAM(S): at 17:06

## 2020-01-01 RX ADMIN — ATORVASTATIN CALCIUM 40 MILLIGRAM(S): 80 TABLET, FILM COATED ORAL at 22:34

## 2020-01-01 RX ADMIN — Medication 10 MILLILITER(S): at 23:56

## 2020-01-01 RX ADMIN — Medication 1000 MILLIGRAM(S): at 17:44

## 2020-01-01 RX ADMIN — SENNA PLUS 2 TABLET(S): 8.6 TABLET ORAL at 22:35

## 2020-01-01 RX ADMIN — INSULIN HUMAN 1 UNIT(S)/HR: 100 INJECTION, SOLUTION SUBCUTANEOUS at 06:00

## 2020-01-01 RX ADMIN — Medication 5 MILLIGRAM(S): at 05:26

## 2020-01-01 RX ADMIN — MIDAZOLAM HYDROCHLORIDE 1.36 MG/KG/HR: 1 INJECTION, SOLUTION INTRAMUSCULAR; INTRAVENOUS at 06:00

## 2020-01-01 RX ADMIN — CHLORHEXIDINE GLUCONATE 15 MILLILITER(S): 213 SOLUTION TOPICAL at 18:55

## 2020-01-01 RX ADMIN — Medication 1000 MILLIGRAM(S): at 05:10

## 2020-01-01 RX ADMIN — SODIUM CHLORIDE 50 MILLILITER(S): 9 INJECTION, SOLUTION INTRAVENOUS at 21:38

## 2020-01-01 RX ADMIN — Medication 5 MILLIGRAM(S): at 12:05

## 2020-01-01 RX ADMIN — CHLORHEXIDINE GLUCONATE 15 MILLILITER(S): 213 SOLUTION TOPICAL at 17:41

## 2020-01-01 RX ADMIN — AMIODARONE HYDROCHLORIDE 33.3 MG/MIN: 400 TABLET ORAL at 01:15

## 2020-01-01 RX ADMIN — HEPARIN SODIUM 5000 UNIT(S): 5000 INJECTION INTRAVENOUS; SUBCUTANEOUS at 05:55

## 2020-01-01 RX ADMIN — CHLORHEXIDINE GLUCONATE 15 MILLILITER(S): 213 SOLUTION TOPICAL at 17:09

## 2020-01-01 RX ADMIN — Medication 12.5 MILLIGRAM(S): at 16:12

## 2020-01-01 RX ADMIN — Medication 10 MILLILITER(S): at 20:34

## 2020-01-01 RX ADMIN — MEROPENEM 100 MILLIGRAM(S): 1 INJECTION INTRAVENOUS at 18:35

## 2020-01-01 RX ADMIN — HEPARIN SODIUM 5000 UNIT(S): 5000 INJECTION INTRAVENOUS; SUBCUTANEOUS at 21:27

## 2020-01-01 RX ADMIN — HEPARIN SODIUM 5000 UNIT(S): 5000 INJECTION INTRAVENOUS; SUBCUTANEOUS at 14:49

## 2020-01-01 RX ADMIN — PHENYLEPHRINE HYDROCHLORIDE 1.64 MICROGRAM(S)/KG/MIN: 10 INJECTION INTRAVENOUS at 17:43

## 2020-01-01 RX ADMIN — CHLORHEXIDINE GLUCONATE 15 MILLILITER(S): 213 SOLUTION TOPICAL at 05:28

## 2020-01-01 RX ADMIN — Medication 200 MILLIGRAM(S): at 03:05

## 2020-01-01 RX ADMIN — ZINC SULFATE TAB 220 MG (50 MG ZINC EQUIVALENT) 220 MILLIGRAM(S): 220 (50 ZN) TAB at 11:08

## 2020-01-01 RX ADMIN — CHLORHEXIDINE GLUCONATE 15 MILLILITER(S): 213 SOLUTION TOPICAL at 06:53

## 2020-01-01 RX ADMIN — INSULIN HUMAN 1 UNIT(S)/HR: 100 INJECTION, SOLUTION SUBCUTANEOUS at 15:43

## 2020-01-01 RX ADMIN — Medication 10 MILLILITER(S): at 13:12

## 2020-01-01 RX ADMIN — LACTULOSE 20 GRAM(S): 10 SOLUTION ORAL at 05:45

## 2020-01-01 RX ADMIN — Medication 100 MILLIEQUIVALENT(S): at 09:39

## 2020-01-01 RX ADMIN — CEFEPIME 100 MILLIGRAM(S): 1 INJECTION, POWDER, FOR SOLUTION INTRAMUSCULAR; INTRAVENOUS at 17:42

## 2020-01-01 RX ADMIN — CHLORHEXIDINE GLUCONATE 15 MILLILITER(S): 213 SOLUTION TOPICAL at 17:42

## 2020-01-01 RX ADMIN — PANTOPRAZOLE SODIUM 40 MILLIGRAM(S): 20 TABLET, DELAYED RELEASE ORAL at 12:54

## 2020-01-01 RX ADMIN — CLOPIDOGREL BISULFATE 75 MILLIGRAM(S): 75 TABLET, FILM COATED ORAL at 14:27

## 2020-01-01 RX ADMIN — Medication 10 MILLILITER(S): at 06:57

## 2020-01-01 RX ADMIN — CEFEPIME 100 MILLIGRAM(S): 1 INJECTION, POWDER, FOR SOLUTION INTRAMUSCULAR; INTRAVENOUS at 05:11

## 2020-01-01 RX ADMIN — CEFEPIME 100 MILLIGRAM(S): 1 INJECTION, POWDER, FOR SOLUTION INTRAMUSCULAR; INTRAVENOUS at 05:45

## 2020-01-01 RX ADMIN — Medication 1000 MILLIGRAM(S): at 17:00

## 2020-01-01 RX ADMIN — Medication 4.09 MICROGRAM(S)/KG/MIN: at 12:40

## 2020-01-01 RX ADMIN — MIDAZOLAM HYDROCHLORIDE 2 MILLIGRAM(S): 1 INJECTION, SOLUTION INTRAMUSCULAR; INTRAVENOUS at 00:10

## 2020-01-01 RX ADMIN — Medication 5: at 10:27

## 2020-01-01 RX ADMIN — Medication 10 MILLILITER(S): at 21:45

## 2020-01-01 RX ADMIN — CEFEPIME 100 MILLIGRAM(S): 1 INJECTION, POWDER, FOR SOLUTION INTRAMUSCULAR; INTRAVENOUS at 05:02

## 2020-01-01 RX ADMIN — CHLORHEXIDINE GLUCONATE 15 MILLILITER(S): 213 SOLUTION TOPICAL at 05:48

## 2020-01-01 RX ADMIN — Medication 10 MILLILITER(S): at 21:16

## 2020-01-01 RX ADMIN — VASOPRESSIN 2.4 UNIT(S)/MIN: 20 INJECTION INTRAVENOUS at 10:30

## 2020-01-01 RX ADMIN — ZINC SULFATE TAB 220 MG (50 MG ZINC EQUIVALENT) 220 MILLIGRAM(S): 220 (50 ZN) TAB at 10:37

## 2020-01-01 RX ADMIN — Medication 1000 MILLIGRAM(S): at 18:12

## 2020-01-01 RX ADMIN — CHLORHEXIDINE GLUCONATE 15 MILLILITER(S): 213 SOLUTION TOPICAL at 17:44

## 2020-01-01 RX ADMIN — Medication 100 GRAM(S): at 08:10

## 2020-01-01 RX ADMIN — HEPARIN SODIUM 5000 UNIT(S): 5000 INJECTION INTRAVENOUS; SUBCUTANEOUS at 12:24

## 2020-01-01 RX ADMIN — AZITHROMYCIN 250 MILLIGRAM(S): 500 TABLET, FILM COATED ORAL at 10:36

## 2020-01-01 RX ADMIN — Medication 200 MILLIGRAM(S): at 03:50

## 2020-01-01 RX ADMIN — Medication 4.09 MICROGRAM(S)/KG/MIN: at 18:03

## 2020-01-01 RX ADMIN — Medication 1000 MILLIGRAM(S): at 17:22

## 2020-01-01 RX ADMIN — PANTOPRAZOLE SODIUM 40 MILLIGRAM(S): 20 TABLET, DELAYED RELEASE ORAL at 10:45

## 2020-01-01 RX ADMIN — Medication 4.09 MICROGRAM(S)/KG/MIN: at 10:46

## 2020-01-01 RX ADMIN — LACTULOSE 20 GRAM(S): 10 SOLUTION ORAL at 05:28

## 2020-01-01 RX ADMIN — CLOPIDOGREL BISULFATE 75 MILLIGRAM(S): 75 TABLET, FILM COATED ORAL at 12:33

## 2020-01-01 RX ADMIN — Medication 10 MILLILITER(S): at 05:28

## 2020-01-01 RX ADMIN — Medication 1000 MILLIGRAM(S): at 17:06

## 2020-01-01 RX ADMIN — HEPARIN SODIUM 5000 UNIT(S): 5000 INJECTION INTRAVENOUS; SUBCUTANEOUS at 22:34

## 2020-01-01 RX ADMIN — Medication 1000 MILLIGRAM(S): at 05:17

## 2020-01-01 RX ADMIN — CLOPIDOGREL BISULFATE 75 MILLIGRAM(S): 75 TABLET, FILM COATED ORAL at 11:02

## 2020-01-01 RX ADMIN — Medication 12.5 MILLIGRAM(S): at 18:38

## 2020-01-01 RX ADMIN — HEPARIN SODIUM 5000 UNIT(S): 5000 INJECTION INTRAVENOUS; SUBCUTANEOUS at 13:00

## 2020-01-01 RX ADMIN — SENNA PLUS 2 TABLET(S): 8.6 TABLET ORAL at 21:15

## 2020-01-01 RX ADMIN — Medication 1000 MILLIGRAM(S): at 05:28

## 2020-01-01 RX ADMIN — SENNA PLUS 2 TABLET(S): 8.6 TABLET ORAL at 21:39

## 2020-01-01 RX ADMIN — ZINC SULFATE TAB 220 MG (50 MG ZINC EQUIVALENT) 220 MILLIGRAM(S): 220 (50 ZN) TAB at 10:05

## 2020-01-01 RX ADMIN — Medication 200 MILLIGRAM(S): at 12:23

## 2020-01-01 RX ADMIN — PANTOPRAZOLE SODIUM 40 MILLIGRAM(S): 20 TABLET, DELAYED RELEASE ORAL at 12:21

## 2020-01-01 RX ADMIN — Medication 650 MILLIGRAM(S): at 08:51

## 2020-01-01 RX ADMIN — CHLORHEXIDINE GLUCONATE 15 MILLILITER(S): 213 SOLUTION TOPICAL at 16:31

## 2020-01-01 RX ADMIN — LACTULOSE 20 GRAM(S): 10 SOLUTION ORAL at 16:31

## 2020-01-01 RX ADMIN — Medication 40 MILLIGRAM(S): at 17:37

## 2020-01-01 RX ADMIN — CEFEPIME 100 MILLIGRAM(S): 1 INJECTION, POWDER, FOR SOLUTION INTRAMUSCULAR; INTRAVENOUS at 05:20

## 2020-01-01 RX ADMIN — CHLORHEXIDINE GLUCONATE 1 APPLICATION(S): 213 SOLUTION TOPICAL at 12:31

## 2020-01-01 RX ADMIN — HEPARIN SODIUM 5000 UNIT(S): 5000 INJECTION INTRAVENOUS; SUBCUTANEOUS at 05:28

## 2020-01-01 RX ADMIN — Medication 10 MILLILITER(S): at 21:53

## 2020-01-01 RX ADMIN — SENNA PLUS 2 TABLET(S): 8.6 TABLET ORAL at 21:27

## 2020-01-01 RX ADMIN — Medication 60 MILLIGRAM(S): at 05:43

## 2020-01-01 RX ADMIN — CLOPIDOGREL BISULFATE 75 MILLIGRAM(S): 75 TABLET, FILM COATED ORAL at 11:51

## 2020-01-01 RX ADMIN — Medication 50 GRAM(S): at 16:55

## 2020-01-01 RX ADMIN — Medication 1000 MILLIGRAM(S): at 17:25

## 2020-01-01 RX ADMIN — ATORVASTATIN CALCIUM 40 MILLIGRAM(S): 80 TABLET, FILM COATED ORAL at 21:37

## 2020-01-01 RX ADMIN — Medication 10 MILLILITER(S): at 05:52

## 2020-01-01 RX ADMIN — Medication 100 MILLIEQUIVALENT(S): at 04:32

## 2020-01-01 RX ADMIN — Medication 10 MILLILITER(S): at 05:45

## 2020-01-01 RX ADMIN — Medication 1000 MILLIGRAM(S): at 16:38

## 2020-01-01 RX ADMIN — ZINC SULFATE TAB 220 MG (50 MG ZINC EQUIVALENT) 220 MILLIGRAM(S): 220 (50 ZN) TAB at 10:45

## 2020-01-01 RX ADMIN — CEFEPIME 100 MILLIGRAM(S): 1 INJECTION, POWDER, FOR SOLUTION INTRAMUSCULAR; INTRAVENOUS at 06:34

## 2020-01-01 RX ADMIN — SODIUM CHLORIDE 1000 MILLILITER(S): 9 INJECTION, SOLUTION INTRAVENOUS at 09:10

## 2020-01-01 RX ADMIN — Medication 325 MILLIGRAM(S): at 12:18

## 2020-01-01 RX ADMIN — Medication 3.19 MICROGRAM(S)/KG/MIN: at 00:30

## 2020-01-01 RX ADMIN — HEPARIN SODIUM 5000 UNIT(S): 5000 INJECTION INTRAVENOUS; SUBCUTANEOUS at 00:44

## 2020-01-01 RX ADMIN — HEPARIN SODIUM 5000 UNIT(S): 5000 INJECTION INTRAVENOUS; SUBCUTANEOUS at 21:07

## 2020-01-01 RX ADMIN — Medication 650 MILLIGRAM(S): at 22:06

## 2020-01-01 RX ADMIN — MORPHINE SULFATE 2 MG/HR: 50 CAPSULE, EXTENDED RELEASE ORAL at 17:45

## 2020-01-01 RX ADMIN — Medication 10 MILLILITER(S): at 06:12

## 2020-01-01 RX ADMIN — Medication 2.5 MILLIGRAM(S): at 03:25

## 2020-01-01 RX ADMIN — SODIUM CHLORIDE 1000 MILLILITER(S): 9 INJECTION, SOLUTION INTRAVENOUS at 03:30

## 2020-01-01 RX ADMIN — Medication 100 MILLIEQUIVALENT(S): at 07:00

## 2020-01-01 RX ADMIN — Medication 650 MILLIGRAM(S): at 06:32

## 2020-01-01 RX ADMIN — HEPARIN SODIUM 5000 UNIT(S): 5000 INJECTION INTRAVENOUS; SUBCUTANEOUS at 22:00

## 2020-01-01 RX ADMIN — HEPARIN SODIUM 5000 UNIT(S): 5000 INJECTION INTRAVENOUS; SUBCUTANEOUS at 12:20

## 2020-01-01 RX ADMIN — LISINOPRIL 20 MILLIGRAM(S): 2.5 TABLET ORAL at 06:02

## 2020-01-01 RX ADMIN — SODIUM CHLORIDE 1000 MILLILITER(S): 9 INJECTION INTRAMUSCULAR; INTRAVENOUS; SUBCUTANEOUS at 11:23

## 2020-01-01 RX ADMIN — Medication 40 MILLIGRAM(S): at 10:25

## 2020-01-01 RX ADMIN — Medication 50 MEQ/KG/HR: at 21:31

## 2020-01-01 RX ADMIN — Medication 10 MILLILITER(S): at 11:53

## 2020-01-01 RX ADMIN — Medication 5: at 23:10

## 2020-01-01 RX ADMIN — SENNA PLUS 2 TABLET(S): 8.6 TABLET ORAL at 21:45

## 2020-01-01 RX ADMIN — CHLORHEXIDINE GLUCONATE 15 MILLILITER(S): 213 SOLUTION TOPICAL at 18:20

## 2020-01-01 RX ADMIN — ZINC SULFATE TAB 220 MG (50 MG ZINC EQUIVALENT) 220 MILLIGRAM(S): 220 (50 ZN) TAB at 12:06

## 2020-01-01 RX ADMIN — ATORVASTATIN CALCIUM 40 MILLIGRAM(S): 80 TABLET, FILM COATED ORAL at 21:24

## 2020-01-01 RX ADMIN — SODIUM CHLORIDE 50 MILLILITER(S): 9 INJECTION, SOLUTION INTRAVENOUS at 15:43

## 2020-01-01 RX ADMIN — HEPARIN SODIUM 5000 UNIT(S): 5000 INJECTION INTRAVENOUS; SUBCUTANEOUS at 21:37

## 2020-01-01 RX ADMIN — Medication 12.5 MILLIGRAM(S): at 05:11

## 2020-01-01 RX ADMIN — Medication 3.19 MICROGRAM(S)/KG/MIN: at 10:35

## 2020-01-01 RX ADMIN — CHLORHEXIDINE GLUCONATE 1 APPLICATION(S): 213 SOLUTION TOPICAL at 12:19

## 2020-01-01 RX ADMIN — Medication 3.19 MICROGRAM(S)/KG/MIN: at 15:44

## 2020-01-01 RX ADMIN — HEPARIN SODIUM 5000 UNIT(S): 5000 INJECTION INTRAVENOUS; SUBCUTANEOUS at 14:12

## 2020-01-01 RX ADMIN — INSULIN HUMAN 1 UNIT(S)/HR: 100 INJECTION, SOLUTION SUBCUTANEOUS at 10:34

## 2020-01-01 RX ADMIN — HEPARIN SODIUM 5000 UNIT(S): 5000 INJECTION INTRAVENOUS; SUBCUTANEOUS at 21:45

## 2020-01-01 RX ADMIN — Medication 650 MILLIGRAM(S): at 17:36

## 2020-01-01 RX ADMIN — SEVELAMER CARBONATE 1600 MILLIGRAM(S): 2400 POWDER, FOR SUSPENSION ORAL at 09:58

## 2020-01-01 RX ADMIN — MEROPENEM 100 MILLIGRAM(S): 1 INJECTION INTRAVENOUS at 06:03

## 2020-01-01 RX ADMIN — SODIUM CHLORIDE 50 MILLILITER(S): 9 INJECTION INTRAMUSCULAR; INTRAVENOUS; SUBCUTANEOUS at 09:57

## 2020-01-01 RX ADMIN — PANTOPRAZOLE SODIUM 40 MILLIGRAM(S): 20 TABLET, DELAYED RELEASE ORAL at 10:05

## 2020-01-01 RX ADMIN — Medication 10 MILLILITER(S): at 05:03

## 2020-01-01 RX ADMIN — SODIUM CHLORIDE 100 MILLILITER(S): 9 INJECTION, SOLUTION INTRAVENOUS at 10:00

## 2020-01-01 RX ADMIN — Medication 1000 MILLIGRAM(S): at 05:47

## 2020-01-01 RX ADMIN — Medication 1000 MILLIGRAM(S): at 05:05

## 2020-01-01 RX ADMIN — MEROPENEM 100 MILLIGRAM(S): 1 INJECTION INTRAVENOUS at 05:48

## 2020-01-01 RX ADMIN — LACTULOSE 20 GRAM(S): 10 SOLUTION ORAL at 17:29

## 2020-01-01 RX ADMIN — CLOPIDOGREL BISULFATE 75 MILLIGRAM(S): 75 TABLET, FILM COATED ORAL at 11:15

## 2020-01-01 RX ADMIN — AMIODARONE HYDROCHLORIDE 33.3 MG/MIN: 400 TABLET ORAL at 17:05

## 2020-01-01 RX ADMIN — CHLORHEXIDINE GLUCONATE 15 MILLILITER(S): 213 SOLUTION TOPICAL at 05:27

## 2020-01-01 RX ADMIN — HEPARIN SODIUM 5000 UNIT(S): 5000 INJECTION INTRAVENOUS; SUBCUTANEOUS at 21:25

## 2020-01-01 RX ADMIN — SENNA PLUS 2 TABLET(S): 8.6 TABLET ORAL at 00:46

## 2020-01-01 RX ADMIN — Medication 60 MILLIGRAM(S): at 17:22

## 2020-01-01 RX ADMIN — CHLORHEXIDINE GLUCONATE 1 APPLICATION(S): 213 SOLUTION TOPICAL at 10:45

## 2020-01-01 RX ADMIN — CHLORHEXIDINE GLUCONATE 1 APPLICATION(S): 213 SOLUTION TOPICAL at 00:04

## 2020-01-01 RX ADMIN — MEROPENEM 100 MILLIGRAM(S): 1 INJECTION INTRAVENOUS at 05:17

## 2020-01-01 RX ADMIN — CHLORHEXIDINE GLUCONATE 1 APPLICATION(S): 213 SOLUTION TOPICAL at 11:50

## 2020-01-01 RX ADMIN — Medication 1000 MILLIGRAM(S): at 09:14

## 2020-01-01 RX ADMIN — CHLORHEXIDINE GLUCONATE 15 MILLILITER(S): 213 SOLUTION TOPICAL at 07:49

## 2020-01-01 RX ADMIN — MORPHINE SULFATE 2 MG/HR: 50 CAPSULE, EXTENDED RELEASE ORAL at 02:23

## 2020-01-01 RX ADMIN — Medication 10 MILLILITER(S): at 06:02

## 2020-01-01 RX ADMIN — CLOPIDOGREL BISULFATE 75 MILLIGRAM(S): 75 TABLET, FILM COATED ORAL at 12:55

## 2020-01-01 RX ADMIN — CHLORHEXIDINE GLUCONATE 15 MILLILITER(S): 213 SOLUTION TOPICAL at 06:56

## 2020-01-01 RX ADMIN — Medication 1000 MILLIGRAM(S): at 17:42

## 2020-01-01 RX ADMIN — MEROPENEM 100 MILLIGRAM(S): 1 INJECTION INTRAVENOUS at 17:33

## 2020-01-01 RX ADMIN — CEFEPIME 100 MILLIGRAM(S): 1 INJECTION, POWDER, FOR SOLUTION INTRAMUSCULAR; INTRAVENOUS at 05:04

## 2020-01-01 RX ADMIN — Medication 50 MILLIEQUIVALENT(S): at 20:30

## 2020-01-01 RX ADMIN — Medication 1000 MILLIGRAM(S): at 06:02

## 2020-01-01 RX ADMIN — Medication 60 MILLIGRAM(S): at 05:04

## 2020-01-01 RX ADMIN — Medication 10 MILLILITER(S): at 05:47

## 2020-01-01 RX ADMIN — Medication 3 UNIT(S): at 17:19

## 2020-01-01 RX ADMIN — MORPHINE SULFATE 2 MG/HR: 50 CAPSULE, EXTENDED RELEASE ORAL at 23:00

## 2020-01-01 RX ADMIN — Medication 60 MILLIGRAM(S): at 16:39

## 2020-01-01 RX ADMIN — ZINC SULFATE TAB 220 MG (50 MG ZINC EQUIVALENT) 220 MILLIGRAM(S): 220 (50 ZN) TAB at 12:32

## 2020-01-01 RX ADMIN — SODIUM CHLORIDE 500 MILLILITER(S): 9 INJECTION, SOLUTION INTRAVENOUS at 14:45

## 2020-01-01 RX ADMIN — HEPARIN SODIUM 5000 UNIT(S): 5000 INJECTION INTRAVENOUS; SUBCUTANEOUS at 22:33

## 2020-01-01 RX ADMIN — MIDAZOLAM HYDROCHLORIDE 1.36 MG/KG/HR: 1 INJECTION, SOLUTION INTRAMUSCULAR; INTRAVENOUS at 23:00

## 2020-01-01 RX ADMIN — CHLORHEXIDINE GLUCONATE 15 MILLILITER(S): 213 SOLUTION TOPICAL at 06:03

## 2020-01-01 RX ADMIN — QUETIAPINE FUMARATE 25 MILLIGRAM(S): 200 TABLET, FILM COATED ORAL at 12:05

## 2020-01-01 RX ADMIN — MORPHINE SULFATE 2 MG/HR: 50 CAPSULE, EXTENDED RELEASE ORAL at 19:06

## 2020-01-01 RX ADMIN — SODIUM CHLORIDE 500 MILLILITER(S): 9 INJECTION, SOLUTION INTRAVENOUS at 19:00

## 2020-01-01 RX ADMIN — EPINEPHRINE 1.64 MICROGRAM(S)/KG/MIN: 0.3 INJECTION INTRAMUSCULAR; SUBCUTANEOUS at 21:21

## 2020-01-01 RX ADMIN — PANTOPRAZOLE SODIUM 40 MILLIGRAM(S): 20 TABLET, DELAYED RELEASE ORAL at 11:52

## 2020-01-01 RX ADMIN — MORPHINE SULFATE 4 MILLIGRAM(S): 50 CAPSULE, EXTENDED RELEASE ORAL at 19:10

## 2020-01-01 RX ADMIN — Medication 3.19 MICROGRAM(S)/KG/MIN: at 18:06

## 2020-01-01 RX ADMIN — HEPARIN SODIUM 5000 UNIT(S): 5000 INJECTION INTRAVENOUS; SUBCUTANEOUS at 22:28

## 2020-01-01 RX ADMIN — Medication 650 MILLIGRAM(S): at 11:10

## 2020-01-01 RX ADMIN — Medication 12.5 MILLIGRAM(S): at 05:48

## 2020-01-01 RX ADMIN — ATORVASTATIN CALCIUM 40 MILLIGRAM(S): 80 TABLET, FILM COATED ORAL at 00:45

## 2020-01-01 RX ADMIN — MEROPENEM 100 MILLIGRAM(S): 1 INJECTION INTRAVENOUS at 06:59

## 2020-01-01 RX ADMIN — INSULIN HUMAN 7 UNIT(S): 100 INJECTION, SOLUTION SUBCUTANEOUS at 13:38

## 2020-01-01 RX ADMIN — Medication 10 MILLILITER(S): at 12:33

## 2020-01-01 RX ADMIN — AMLODIPINE BESYLATE 5 MILLIGRAM(S): 2.5 TABLET ORAL at 05:08

## 2020-01-01 RX ADMIN — Medication 4.09 MICROGRAM(S)/KG/MIN: at 16:05

## 2020-01-01 RX ADMIN — CHLORHEXIDINE GLUCONATE 15 MILLILITER(S): 213 SOLUTION TOPICAL at 16:38

## 2020-01-01 RX ADMIN — ATORVASTATIN CALCIUM 40 MILLIGRAM(S): 80 TABLET, FILM COATED ORAL at 22:28

## 2020-01-01 RX ADMIN — SEVELAMER CARBONATE 1600 MILLIGRAM(S): 2400 POWDER, FOR SUSPENSION ORAL at 17:10

## 2020-01-01 RX ADMIN — Medication 4.09 MICROGRAM(S)/KG/MIN: at 18:07

## 2020-01-01 RX ADMIN — HEPARIN SODIUM 5000 UNIT(S): 5000 INJECTION INTRAVENOUS; SUBCUTANEOUS at 06:57

## 2020-01-01 RX ADMIN — MEROPENEM 100 MILLIGRAM(S): 1 INJECTION INTRAVENOUS at 17:35

## 2020-01-01 RX ADMIN — Medication 100 GRAM(S): at 20:52

## 2020-01-01 RX ADMIN — Medication 12.5 MILLIGRAM(S): at 17:24

## 2020-01-01 RX ADMIN — LISINOPRIL 20 MILLIGRAM(S): 2.5 TABLET ORAL at 06:05

## 2020-01-01 RX ADMIN — SEVELAMER CARBONATE 1600 MILLIGRAM(S): 2400 POWDER, FOR SUSPENSION ORAL at 11:48

## 2020-01-01 RX ADMIN — Medication 1000 MILLIGRAM(S): at 17:52

## 2020-01-01 RX ADMIN — CISATRACURIUM BESYLATE 10 MILLIGRAM(S): 2 INJECTION INTRAVENOUS at 13:40

## 2020-01-01 RX ADMIN — ATORVASTATIN CALCIUM 40 MILLIGRAM(S): 80 TABLET, FILM COATED ORAL at 21:39

## 2020-01-01 RX ADMIN — PROPOFOL 2.04 MICROGRAM(S)/KG/MIN: 10 INJECTION, EMULSION INTRAVENOUS at 19:39

## 2020-01-01 RX ADMIN — Medication 60 MILLIGRAM(S): at 05:27

## 2020-01-01 RX ADMIN — SENNA PLUS 2 TABLET(S): 8.6 TABLET ORAL at 22:24

## 2020-01-01 RX ADMIN — Medication 200 MILLIGRAM(S): at 14:49

## 2020-01-01 RX ADMIN — CEFEPIME 100 MILLIGRAM(S): 1 INJECTION, POWDER, FOR SOLUTION INTRAMUSCULAR; INTRAVENOUS at 16:30

## 2020-01-01 RX ADMIN — Medication 1000 MILLIGRAM(S): at 17:16

## 2020-01-01 RX ADMIN — CEFEPIME 100 MILLIGRAM(S): 1 INJECTION, POWDER, FOR SOLUTION INTRAMUSCULAR; INTRAVENOUS at 05:08

## 2020-01-01 RX ADMIN — Medication 60 MILLIGRAM(S): at 05:10

## 2020-01-01 RX ADMIN — Medication 50 MILLIGRAM(S): at 18:11

## 2020-01-01 RX ADMIN — ZINC SULFATE TAB 220 MG (50 MG ZINC EQUIVALENT) 220 MILLIGRAM(S): 220 (50 ZN) TAB at 11:02

## 2020-01-01 RX ADMIN — SODIUM CHLORIDE 110 MILLILITER(S): 9 INJECTION, SOLUTION INTRAVENOUS at 06:43

## 2020-01-01 RX ADMIN — HEPARIN SODIUM 5000 UNIT(S): 5000 INJECTION INTRAVENOUS; SUBCUTANEOUS at 21:33

## 2020-01-01 RX ADMIN — HEPARIN SODIUM 5000 UNIT(S): 5000 INJECTION INTRAVENOUS; SUBCUTANEOUS at 12:16

## 2020-01-01 RX ADMIN — Medication 12.5 MILLIGRAM(S): at 05:03

## 2020-01-01 RX ADMIN — CHLORHEXIDINE GLUCONATE 15 MILLILITER(S): 213 SOLUTION TOPICAL at 17:01

## 2020-01-01 RX ADMIN — LACTULOSE 20 GRAM(S): 10 SOLUTION ORAL at 17:32

## 2020-01-01 RX ADMIN — AZITHROMYCIN 250 MILLIGRAM(S): 500 TABLET, FILM COATED ORAL at 12:35

## 2020-01-01 RX ADMIN — CLOPIDOGREL BISULFATE 75 MILLIGRAM(S): 75 TABLET, FILM COATED ORAL at 17:14

## 2020-01-01 RX ADMIN — Medication 8 MICROGRAM(S)/KG/MIN: at 04:30

## 2020-01-01 RX ADMIN — HEPARIN SODIUM 5000 UNIT(S): 5000 INJECTION INTRAVENOUS; SUBCUTANEOUS at 05:08

## 2020-01-01 RX ADMIN — SENNA PLUS 2 TABLET(S): 8.6 TABLET ORAL at 21:37

## 2020-01-01 RX ADMIN — Medication 1000 MILLIGRAM(S): at 05:03

## 2020-01-01 RX ADMIN — SODIUM CHLORIDE 1500 MILLILITER(S): 9 INJECTION, SOLUTION INTRAVENOUS at 21:30

## 2020-01-01 RX ADMIN — Medication 200 MILLIGRAM(S): at 03:13

## 2020-01-01 RX ADMIN — SODIUM CHLORIDE 500 MILLILITER(S): 9 INJECTION, SOLUTION INTRAVENOUS at 20:25

## 2020-01-01 RX ADMIN — Medication 40 MILLIGRAM(S): at 09:40

## 2020-01-01 RX ADMIN — HEPARIN SODIUM 5000 UNIT(S): 5000 INJECTION INTRAVENOUS; SUBCUTANEOUS at 05:47

## 2020-01-01 RX ADMIN — DEXMEDETOMIDINE HYDROCHLORIDE IN 0.9% SODIUM CHLORIDE 3.4 MICROGRAM(S)/KG/HR: 4 INJECTION INTRAVENOUS at 19:07

## 2020-01-01 RX ADMIN — CLOPIDOGREL BISULFATE 75 MILLIGRAM(S): 75 TABLET, FILM COATED ORAL at 12:19

## 2020-01-01 RX ADMIN — ZINC SULFATE TAB 220 MG (50 MG ZINC EQUIVALENT) 220 MILLIGRAM(S): 220 (50 ZN) TAB at 14:27

## 2020-01-01 RX ADMIN — Medication 50 MILLIEQUIVALENT(S): at 20:31

## 2020-01-01 RX ADMIN — Medication 10 MILLILITER(S): at 05:04

## 2020-01-01 RX ADMIN — Medication 60 MILLIGRAM(S): at 06:12

## 2020-01-01 RX ADMIN — ATORVASTATIN CALCIUM 40 MILLIGRAM(S): 80 TABLET, FILM COATED ORAL at 21:16

## 2020-01-01 RX ADMIN — PANTOPRAZOLE SODIUM 40 MILLIGRAM(S): 20 TABLET, DELAYED RELEASE ORAL at 12:46

## 2020-01-01 RX ADMIN — Medication 650 MILLIGRAM(S): at 11:40

## 2020-01-01 RX ADMIN — Medication 1 MG/HR: at 11:21

## 2020-01-01 RX ADMIN — Medication 10 MILLILITER(S): at 12:20

## 2020-01-01 RX ADMIN — AMIODARONE HYDROCHLORIDE 600 MILLIGRAM(S): 400 TABLET ORAL at 17:10

## 2020-01-01 RX ADMIN — MEROPENEM 100 MILLIGRAM(S): 1 INJECTION INTRAVENOUS at 05:55

## 2020-01-01 RX ADMIN — HEPARIN SODIUM 5000 UNIT(S): 5000 INJECTION INTRAVENOUS; SUBCUTANEOUS at 12:05

## 2020-01-01 RX ADMIN — PANTOPRAZOLE SODIUM 40 MILLIGRAM(S): 20 TABLET, DELAYED RELEASE ORAL at 11:55

## 2020-01-01 RX ADMIN — Medication 10 MILLILITER(S): at 21:32

## 2020-01-01 RX ADMIN — HEPARIN SODIUM 5000 UNIT(S): 5000 INJECTION INTRAVENOUS; SUBCUTANEOUS at 05:43

## 2020-01-01 RX ADMIN — LACTULOSE 20 GRAM(S): 10 SOLUTION ORAL at 17:01

## 2020-01-01 RX ADMIN — ATORVASTATIN CALCIUM 40 MILLIGRAM(S): 80 TABLET, FILM COATED ORAL at 20:34

## 2020-01-01 RX ADMIN — CHLORHEXIDINE GLUCONATE 15 MILLILITER(S): 213 SOLUTION TOPICAL at 05:45

## 2020-01-01 RX ADMIN — ATORVASTATIN CALCIUM 40 MILLIGRAM(S): 80 TABLET, FILM COATED ORAL at 21:45

## 2020-01-01 RX ADMIN — Medication 2.5 MILLIGRAM(S): at 01:59

## 2020-01-01 RX ADMIN — CLOPIDOGREL BISULFATE 75 MILLIGRAM(S): 75 TABLET, FILM COATED ORAL at 12:06

## 2020-01-01 RX ADMIN — CHLORHEXIDINE GLUCONATE 15 MILLILITER(S): 213 SOLUTION TOPICAL at 16:13

## 2020-01-01 RX ADMIN — HEPARIN SODIUM 5000 UNIT(S): 5000 INJECTION INTRAVENOUS; SUBCUTANEOUS at 05:30

## 2020-01-01 RX ADMIN — Medication 5 MILLIGRAM(S): at 05:11

## 2020-01-01 RX ADMIN — PANTOPRAZOLE SODIUM 40 MILLIGRAM(S): 20 TABLET, DELAYED RELEASE ORAL at 10:54

## 2020-01-01 RX ADMIN — Medication 10 MILLILITER(S): at 05:57

## 2020-01-01 RX ADMIN — QUETIAPINE FUMARATE 25 MILLIGRAM(S): 200 TABLET, FILM COATED ORAL at 14:27

## 2020-01-01 RX ADMIN — AZITHROMYCIN 250 MILLIGRAM(S): 500 TABLET, FILM COATED ORAL at 10:55

## 2020-01-01 RX ADMIN — HEPARIN SODIUM 5000 UNIT(S): 5000 INJECTION INTRAVENOUS; SUBCUTANEOUS at 12:56

## 2020-01-01 RX ADMIN — SODIUM CHLORIDE 1000 MILLILITER(S): 9 INJECTION, SOLUTION INTRAVENOUS at 11:30

## 2020-01-01 RX ADMIN — CHLORHEXIDINE GLUCONATE 15 MILLILITER(S): 213 SOLUTION TOPICAL at 06:12

## 2020-01-01 RX ADMIN — Medication 200 MILLIGRAM(S): at 12:56

## 2020-01-01 RX ADMIN — ATORVASTATIN CALCIUM 40 MILLIGRAM(S): 80 TABLET, FILM COATED ORAL at 22:35

## 2020-01-01 RX ADMIN — HEPARIN SODIUM 5000 UNIT(S): 5000 INJECTION INTRAVENOUS; SUBCUTANEOUS at 05:03

## 2020-01-01 RX ADMIN — VASOPRESSIN 2.4 UNIT(S)/MIN: 20 INJECTION INTRAVENOUS at 16:50

## 2020-01-01 RX ADMIN — ATORVASTATIN CALCIUM 40 MILLIGRAM(S): 80 TABLET, FILM COATED ORAL at 22:05

## 2020-01-01 RX ADMIN — Medication 10 MILLILITER(S): at 15:56

## 2020-01-01 RX ADMIN — CHLORHEXIDINE GLUCONATE 15 MILLILITER(S): 213 SOLUTION TOPICAL at 18:35

## 2020-01-01 RX ADMIN — Medication 60 MILLIGRAM(S): at 17:41

## 2020-01-01 RX ADMIN — MIDAZOLAM HYDROCHLORIDE 2 MILLIGRAM(S): 1 INJECTION, SOLUTION INTRAMUSCULAR; INTRAVENOUS at 11:16

## 2020-01-01 RX ADMIN — INSULIN HUMAN 1 UNIT(S)/HR: 100 INJECTION, SOLUTION SUBCUTANEOUS at 18:50

## 2020-01-01 RX ADMIN — CHLORHEXIDINE GLUCONATE 1 APPLICATION(S): 213 SOLUTION TOPICAL at 12:21

## 2020-01-01 RX ADMIN — HEPARIN SODIUM 5000 UNIT(S): 5000 INJECTION INTRAVENOUS; SUBCUTANEOUS at 17:10

## 2020-01-01 RX ADMIN — Medication 10 MILLILITER(S): at 05:44

## 2020-01-01 RX ADMIN — INSULIN HUMAN 1 UNIT(S)/HR: 100 INJECTION, SOLUTION SUBCUTANEOUS at 18:05

## 2020-01-01 RX ADMIN — Medication 650 MILLIGRAM(S): at 14:00

## 2020-01-01 RX ADMIN — CHLORHEXIDINE GLUCONATE 15 MILLILITER(S): 213 SOLUTION TOPICAL at 17:16

## 2020-01-01 RX ADMIN — CEFEPIME 100 MILLIGRAM(S): 1 INJECTION, POWDER, FOR SOLUTION INTRAMUSCULAR; INTRAVENOUS at 17:24

## 2020-01-01 RX ADMIN — Medication 60 MILLIGRAM(S): at 18:08

## 2020-01-01 RX ADMIN — Medication 12.5 MILLIGRAM(S): at 05:28

## 2020-01-01 RX ADMIN — ATORVASTATIN CALCIUM 40 MILLIGRAM(S): 80 TABLET, FILM COATED ORAL at 22:18

## 2020-01-01 RX ADMIN — SODIUM CHLORIDE 500 MILLILITER(S): 9 INJECTION, SOLUTION INTRAVENOUS at 18:05

## 2020-01-01 RX ADMIN — SENNA PLUS 2 TABLET(S): 8.6 TABLET ORAL at 21:16

## 2020-01-01 RX ADMIN — CLOPIDOGREL BISULFATE 75 MILLIGRAM(S): 75 TABLET, FILM COATED ORAL at 10:36

## 2020-01-01 RX ADMIN — Medication 12.5 MILLIGRAM(S): at 18:51

## 2020-01-01 RX ADMIN — HEPARIN SODIUM 5000 UNIT(S): 5000 INJECTION INTRAVENOUS; SUBCUTANEOUS at 12:33

## 2020-01-01 RX ADMIN — CHLORHEXIDINE GLUCONATE 1 APPLICATION(S): 213 SOLUTION TOPICAL at 04:00

## 2020-01-01 RX ADMIN — Medication 20 MILLIGRAM(S): at 09:22

## 2020-01-01 RX ADMIN — PANTOPRAZOLE SODIUM 40 MILLIGRAM(S): 20 TABLET, DELAYED RELEASE ORAL at 12:07

## 2020-01-01 RX ADMIN — Medication 12.5 MILLIGRAM(S): at 17:42

## 2020-01-01 RX ADMIN — CHLORHEXIDINE GLUCONATE 15 MILLILITER(S): 213 SOLUTION TOPICAL at 17:22

## 2020-01-01 RX ADMIN — CEFEPIME 100 MILLIGRAM(S): 1 INJECTION, POWDER, FOR SOLUTION INTRAMUSCULAR; INTRAVENOUS at 17:41

## 2020-01-01 RX ADMIN — HEPARIN SODIUM 5000 UNIT(S): 5000 INJECTION INTRAVENOUS; SUBCUTANEOUS at 05:53

## 2020-01-01 RX ADMIN — Medication 3: at 07:50

## 2020-01-01 RX ADMIN — CHLORHEXIDINE GLUCONATE 15 MILLILITER(S): 213 SOLUTION TOPICAL at 05:46

## 2020-01-01 RX ADMIN — CLOPIDOGREL BISULFATE 75 MILLIGRAM(S): 75 TABLET, FILM COATED ORAL at 10:54

## 2020-01-01 RX ADMIN — PANTOPRAZOLE SODIUM 40 MILLIGRAM(S): 20 TABLET, DELAYED RELEASE ORAL at 12:55

## 2020-01-01 RX ADMIN — Medication 200 MILLIGRAM(S): at 14:19

## 2020-01-01 RX ADMIN — CEFEPIME 100 MILLIGRAM(S): 1 INJECTION, POWDER, FOR SOLUTION INTRAMUSCULAR; INTRAVENOUS at 22:37

## 2020-01-01 RX ADMIN — Medication 5 MILLIGRAM(S): at 16:40

## 2020-01-01 RX ADMIN — LACTULOSE 20 GRAM(S): 10 SOLUTION ORAL at 05:11

## 2020-01-01 RX ADMIN — Medication 4.09 MICROGRAM(S)/KG/MIN: at 16:13

## 2020-01-01 RX ADMIN — Medication 12.5 MILLIGRAM(S): at 05:17

## 2020-01-01 RX ADMIN — Medication 60 MILLIGRAM(S): at 17:54

## 2020-01-01 RX ADMIN — HEPARIN SODIUM 5000 UNIT(S): 5000 INJECTION INTRAVENOUS; SUBCUTANEOUS at 11:53

## 2020-01-01 RX ADMIN — PANTOPRAZOLE SODIUM 40 MILLIGRAM(S): 20 TABLET, DELAYED RELEASE ORAL at 11:01

## 2020-01-01 RX ADMIN — Medication 40 MILLIGRAM(S): at 16:00

## 2020-01-01 RX ADMIN — HEPARIN SODIUM 5000 UNIT(S): 5000 INJECTION INTRAVENOUS; SUBCUTANEOUS at 05:49

## 2020-01-01 RX ADMIN — SENNA PLUS 2 TABLET(S): 8.6 TABLET ORAL at 23:56

## 2020-01-01 RX ADMIN — MIDAZOLAM HYDROCHLORIDE 1.71 MG/KG/HR: 1 INJECTION, SOLUTION INTRAMUSCULAR; INTRAVENOUS at 05:20

## 2020-01-01 RX ADMIN — HEPARIN SODIUM 5000 UNIT(S): 5000 INJECTION INTRAVENOUS; SUBCUTANEOUS at 05:10

## 2020-01-01 RX ADMIN — Medication 1000 MILLIGRAM(S): at 17:21

## 2020-01-01 RX ADMIN — HEPARIN SODIUM 5000 UNIT(S): 5000 INJECTION INTRAVENOUS; SUBCUTANEOUS at 21:26

## 2020-01-01 RX ADMIN — LISINOPRIL 20 MILLIGRAM(S): 2.5 TABLET ORAL at 05:53

## 2020-01-01 RX ADMIN — HEPARIN SODIUM 5000 UNIT(S): 5000 INJECTION INTRAVENOUS; SUBCUTANEOUS at 05:17

## 2020-01-01 RX ADMIN — Medication 40 MILLIGRAM(S): at 09:57

## 2020-01-01 RX ADMIN — ATORVASTATIN CALCIUM 40 MILLIGRAM(S): 80 TABLET, FILM COATED ORAL at 21:17

## 2020-01-01 RX ADMIN — Medication 650 MILLIGRAM(S): at 23:35

## 2020-01-01 RX ADMIN — Medication 10 MILLILITER(S): at 14:49

## 2020-01-01 RX ADMIN — PROPOFOL 5.12 MICROGRAM(S)/KG/MIN: 10 INJECTION, EMULSION INTRAVENOUS at 00:34

## 2020-01-01 RX ADMIN — ATORVASTATIN CALCIUM 40 MILLIGRAM(S): 80 TABLET, FILM COATED ORAL at 21:07

## 2020-01-01 RX ADMIN — Medication 10 MILLILITER(S): at 12:38

## 2020-01-01 RX ADMIN — ZINC SULFATE TAB 220 MG (50 MG ZINC EQUIVALENT) 220 MILLIGRAM(S): 220 (50 ZN) TAB at 10:55

## 2020-01-01 RX ADMIN — SENNA PLUS 2 TABLET(S): 8.6 TABLET ORAL at 20:34

## 2020-01-01 RX ADMIN — MIDAZOLAM HYDROCHLORIDE 1.36 MG/KG/HR: 1 INJECTION, SOLUTION INTRAMUSCULAR; INTRAVENOUS at 22:48

## 2020-01-01 RX ADMIN — INSULIN HUMAN 1 UNIT(S)/HR: 100 INJECTION, SOLUTION SUBCUTANEOUS at 12:00

## 2020-01-01 RX ADMIN — LACTULOSE 20 GRAM(S): 10 SOLUTION ORAL at 05:03

## 2020-01-01 RX ADMIN — MEROPENEM 100 MILLIGRAM(S): 1 INJECTION INTRAVENOUS at 16:12

## 2020-01-01 RX ADMIN — Medication 4.09 MICROGRAM(S)/KG/MIN: at 16:53

## 2020-01-01 RX ADMIN — CEFEPIME 100 MILLIGRAM(S): 1 INJECTION, POWDER, FOR SOLUTION INTRAMUSCULAR; INTRAVENOUS at 17:40

## 2020-01-01 RX ADMIN — Medication 10 MILLILITER(S): at 18:55

## 2020-01-01 RX ADMIN — CLOPIDOGREL BISULFATE 75 MILLIGRAM(S): 75 TABLET, FILM COATED ORAL at 10:05

## 2020-01-01 RX ADMIN — HEPARIN SODIUM 5000 UNIT(S): 5000 INJECTION INTRAVENOUS; SUBCUTANEOUS at 21:15

## 2020-01-01 RX ADMIN — CEFEPIME 100 MILLIGRAM(S): 1 INJECTION, POWDER, FOR SOLUTION INTRAMUSCULAR; INTRAVENOUS at 05:27

## 2020-01-01 RX ADMIN — AZITHROMYCIN 250 MILLIGRAM(S): 500 TABLET, FILM COATED ORAL at 12:55

## 2020-01-01 RX ADMIN — ATORVASTATIN CALCIUM 40 MILLIGRAM(S): 80 TABLET, FILM COATED ORAL at 21:26

## 2020-01-01 RX ADMIN — Medication 10 MILLILITER(S): at 22:27

## 2020-01-01 RX ADMIN — Medication 10 MILLILITER(S): at 04:01

## 2020-01-01 RX ADMIN — HEPARIN SODIUM 5000 UNIT(S): 5000 INJECTION INTRAVENOUS; SUBCUTANEOUS at 05:04

## 2020-03-27 NOTE — ED ADULT TRIAGE NOTE - CHIEF COMPLAINT QUOTE
pt biba from home; pt has been sick with fever and cough x 3 days; pt had syncopal episode at home, son stated demi had no pulse and CPR was performed for 1 minute when pt came to; pt was awake and AXOX4 when EMS arrived

## 2020-03-28 NOTE — H&P ADULT - NSICDXPASTMEDICALHX_GEN_ALL_CORE_FT
PAST MEDICAL HISTORY:  Arthropathy Arthritis    Essential hypertension HTN (hypertension)    Hyperlipidemia Hyperlipidemia    Hypothyroidism Hypothyroidism    Uncontrolled type 2 diabetes mellitus Diabetes mellitus type II, uncontrolled

## 2020-03-28 NOTE — ED PROVIDER NOTE - CLINICAL SUMMARY MEDICAL DECISION MAKING FREE TEXT BOX
patient presented to ED for ,remained hemodynamically stable, results of the labs, imaging findings reviewed and discussed with patient, discussed with admitting physician and MAR, patient is admitted to Medicine for further evaluation and care.

## 2020-03-28 NOTE — ED ADULT NURSE NOTE - OBJECTIVE STATEMENT
pt is a 76 yo female pw syncope. pt states had fever and cough for 3 days tmax 101 and sts fell at home when standing from bathroom, ?ht, +loc. pt denies pain. denies injury. denies SOB, recent travel, chest pain, nausea, vomiting, diarrhea, diaphoresis, headache, dizziness, loc, cough, fever, trauma, numbness, tingling, urinary symptoms. full rom, abd soft nontender.

## 2020-03-28 NOTE — H&P ADULT - NSHPREVIEWOFSYSTEMS_GEN_ALL_CORE
CONSTITUTIONAL: No weakness, fevers or chills    RESPIRATORY: No cough, wheezing, hemoptysis; No shortness of breath    CARDIOVASCULAR: No chest pain or palpitations    GASTROINTESTINAL: No abdominal or epigastric pain. No nausea, vomiting, No diarrhea, no bloody stools    GENITOURINARY: No dysuria, frequency or hematuria    Neuro: no Numbness/ Tingling CONSTITUTIONAL: Endorses  weakness, fevers and  chills    RESPIRATORY: + cough, No wheezing, hemoptysis; No shortness of breath    CARDIOVASCULAR: No chest pain or palpitations    GASTROINTESTINAL: No abdominal/ epigastric pain. + nausea, vomiting, No diarrhea, no bloody stools    GENITOURINARY: No dysuria, frequency or hematuria    Neuro: no Numbness/ Tingling

## 2020-03-28 NOTE — ED PROVIDER NOTE - OBJECTIVE STATEMENT
74 yo female hx of HTN/HLD/DM/CAD present c/o syncope. patient also reported coughing x for 4 days and fever Tmax 101 2 days ago. fever had improved but still has the cough. Reported she passed out after she walked back to her bedroom after using bathroom. as per EMS note, family thought she was dead and did CPR on patient for a minute. patient denies HA/dizziness/chest pain/abd pain/n/v/d/urinary sxs and bloody stool. denies recent travel and sick contact.

## 2020-03-28 NOTE — H&P ADULT - HISTORY OF PRESENT ILLNESS
INCOMPLETE NOTE    patient is a 76 yo female with medical hx of HTN, DM II, Hypothyroidism HLD BIBA for evaluation of syncope, Pt also endorses Cough and fever for 3 days duration. Patient states she fell at home while  standing from bathroom, ?ht, +loc.     pt denies pain. denies injury. denies SOB, recent travel, chest pain, nausea, vomiting, diarrhea, diaphoresis, headache, dizziness, loc, cough, fever, trauma, numbness, tingling, urinary symptoms Patient is a 76 yo female with medical hx of HTN, DM II, Hypothyroidism HLD BIBA for evaluation of syncope, Pt also endorses Cough generalized weakness and fever of 101F  for 3-4 days duration. Patient states she fell at home while trying to get out of bed, she remembered feeling weak and nauseous  right before the fall. Pt also states that her Son gave her CPR as he couldn't feel her pulse after she collapsed, CPR lasted for about one minute before achieving ROSC. No HT, No AC use, + LOC and AP use    Patient  is also sick at home with similar symptoms, she denied known covid exposure and recent travel. Denies Pain, headache CP, palpitation, Abdominal pain,  Diarrhea , numbness, tingling, urinary symptoms    Vitals in ED remarkable for elevated /90  CXR: B/L Peripheral and Interstitial opacities

## 2020-03-28 NOTE — H&P ADULT - ASSESSMENT
Patient is a 74 yo female with medical hx of HTN, DM II, Hypothyroidism HLD BIBA for evaluation of new onset syncope,  cough and fever for 3 days duration     New Onset Syncope  -Symptoms prior  ???  -CTH: No acute Intracranial pathology   -Likely Cardiac etiology given hx of resuscitation  -Monitor Tele for 24hrs,  F/up 2D ECHO, carotid Duplex,  -f/up Orthostatic Vital       Community Acquired Pneumonia, likely Viral,  R/O COVID  -Sepsis ruled out on admission  , Presenting with fever and cough  -CXR: b/l peripheral and interstitial opacity, likely Pneumonia given symptoms   -f/up Blood and urine cx   - No recent travel or CoVid contacts (that patient is aware of).   - Flu A/B/RSV negative  - f/u COVID19 PCR,  procalcitonin, ESR/CRP, ferritin, fibrinogen, CK and LDH (ordered).    - Tylenol PRN for fevers, Trend CBC and LFTs.  - f/u ID consult.    H/O Essential HTN  -Reading elevated on admission 190's  -c/w Dash diet    H/O DM II   H/O Hypothyroidism  H/O Hyperlipidemia    DVT PPX: Lovenox  GI PPX: not indicated  Full code   Dispo: from Home  -Pending clinical improvement Patient is a 74 yo female with medical hx of HTN, DM II, Hypothyroidism HLD BIBA for evaluation of new onset syncope,  cough and fever for 3 days duration     New Onset Syncope  -Felt Nauseous and lightheaded prior to fall    -CTH: No acute Intracranial pathology   -Likely Cardiac etiology given hx of resuscitation at home  -Monitor Tele for 24hrs,  F/up 2D ECHO, carotid Duplex,  -f/up Orthostatic Vitals  -c/w IVF        Community Acquired Pneumonia, likely Viral,  R/O COVID  -Sepsis ruled out on admission  , Presenting with fever and cough  -CXR: b/l peripheral and interstitial opacity, likely Pneumonia given symptoms   - Endorses sick contact at home , No recent travel, no known covid contact   - Flu A/B/RSV negative, -f/up Blood and urine cx    - f/u COVID19 PCR,  procalcitonin, ESR/CRP, ferritin, fibrinogen, CK and LDH (ordered).    - Tylenol PRN for fevers, Trend CBC and LFTs.  - f/u ID consult.    H/O Essential HTN  -Reading elevated on admission 197/90  - improved without intervention >>> 155/57   -c/w Dash diet, Lisinopril     H/O DM II   -Takes Metformin and Glyburide at home   -FS before meals and at bedtime  -Start Insulin regimen if FS are persistently greater than 180     H/O Hypothyroidism  -Off medication??     H/O Hyperlipidemia  -c/w Atorvastatin in house( takes Rosuvastatin 20mg at home )     DVT PPX: Lovenox  GI PPX: not indicated  Full code   Dispo: from Home  -Pending clinical improvement

## 2020-03-28 NOTE — H&P ADULT - NSHPPHYSICALEXAM_GEN_ALL_CORE
General :     Heart:     Lungs;     Abdomen:     Musculoskeletal:     Neuro: General : NAD, resting comfortably in bed    Heart: S1/S2 appreciated, RRR, no murmurs    Lungs; CTA b/l     Abdomen: Soft, NT, Nd     Musculoskeletal: Atraumatic, no LE edema, b/l , no skin color changes     Neuro: AO x 4, no focal deficit

## 2020-03-28 NOTE — H&P ADULT - NSHPLABSRESULTS_GEN_ALL_CORE
LABS:                          12.6   9.92  )-----------( 186      ( 27 Mar 2020 23:19 )             38.4     03-27    130<L>  |  92<L>  |  15  ----------------------------<  374<H>  4.3   |  25  |  0.8    Ca    8.7      27 Mar 2020 23:19    TPro  6.9  /  Alb  4.0  /  TBili  0.5  /  DBili  x   /  AST  12  /  ALT  20  /  AlkPhos  84  03-27            PT/INR - ( 27 Mar 2020 23:19 )   PT: 13.10 sec;   INR: 1.14 ratio         PTT - ( 27 Mar 2020 23:19 )  PTT:28.3 sec  Lactate Trend    CARDIAC MARKERS ( 27 Mar 2020 23:19 )  x     / <0.01 ng/mL / x     / x     / x          CAPILLARY BLOOD GLUCOSE    RADIOLOGY:  < from: Xray Chest 1 View AP/PA (03.28.20 @ 00:40) >    Impression:      Bilateral peripheral and interstitial opacities. Consider viral etiology.    EKGS:

## 2020-03-28 NOTE — ED PROVIDER NOTE - PHYSICAL EXAMINATION
CONSTITUTIONAL: Well-appearing; well-nourished; in no apparent distress.   EYES: PERRL; EOM intact.   NECK: Supple; non-tender; no cervical lymphadenopathy. No JVD.   CARDIOVASCULAR: Normal S1, S2; no murmurs, rubs, or gallops.   RESPIRATORY: Normal chest excursion with respiration; breath sounds clear and equal bilaterally; no wheezes, rhonchi, or rales.  GI/: Normal bowel sounds; non-distended; non-tender; no palpable organomegaly.   MS: No calf swelling and tenderness.   SKIN: Normal for age and race; warm; dry; good turgor; no apparent lesions or exudate.   NEURO/PSYCH: A & O x 4; grossly unremarkable.

## 2020-03-28 NOTE — ED PROVIDER NOTE - NS ED ROS FT
Constitutional: no fever, chills, no recent weight loss, change in appetite or malaise  Eyes: no redness/discharge/pain/vision changes  ENT: no rhinorrhea/ear pain/sore throat  Cardiac: + syncope. No chest pain, SOB or edema.  Respiratory: see HPI  GI: No nausea, vomiting, diarrhea or abdominal pain.  : No dysuria, frequency, urgency or hematuria  MS: no pain to back or extremities, no loss of ROM, no weakness  Neuro: No headache or weakness. No LOC.  Skin: No skin rash.  Endocrine: + diabetes.

## 2020-03-28 NOTE — H&P ADULT - ATTENDING COMMENTS
syncope likely 2/2 vasovagal 2/2 decreased PO intake and infection  tele reviewed, no evidence of significant arrhythmia.  will await Covid-19 results  treat Pna

## 2020-03-29 NOTE — PROGRESS NOTE ADULT - SUBJECTIVE AND OBJECTIVE BOX
Name: TRICIA SANTIZO  Age: 75y  Gender: Female    Pt was seen and examined.   c/o:  doing worse    Allergies:  No Known Allergies      PHYSICAL EXAM:    Vitals:  T(C): 37.8 (03-29-20 @ 12:07), Max: 39.6 (03-29-20 @ 06:30)  HR: 91 (03-29-20 @ 12:07) (91 - 107)  BP: 148/66 (03-29-20 @ 12:07) (139/63 - 148/66)  RR: 18 (03-29-20 @ 12:07) (18 - 18)  SpO2: 93% (03-29-20 @ 12:07) (90% - 94%)  Wt(kg): --Vital Signs Last 24 Hrs  T(C): 37.8 (29 Mar 2020 12:07), Max: 39.6 (29 Mar 2020 06:30)  T(F): 100 (29 Mar 2020 12:07), Max: 103.3 (29 Mar 2020 06:30)  HR: 91 (29 Mar 2020 12:07) (91 - 107)  BP: 148/66 (29 Mar 2020 12:07) (139/63 - 148/66)  BP(mean): --  RR: 18 (29 Mar 2020 12:07) (18 - 18)  SpO2: 93% (29 Mar 2020 12:07) (90% - 94%)      NECK: Supple, No JVD  CHEST/LUNG: b/l rhonchi, wheezing,  HEART: S1,S2, N1 Regular rate and rhythm; No murmurs, rubs, or gallops  ABDOMEN: Soft, Nontender, Nondistended; Bowel sounds present  EXTREMITIES:  2+ Peripheral Pulses, No clubbing, cyanosis, or edema      LABS:                        11.9   8.47  )-----------( 173      ( 29 Mar 2020 06:13 )             35.4     03-29    138  |  99  |  13  ----------------------------<  191<H>  3.9   |  22  |  0.7    Ca    8.5      29 Mar 2020 06:13  Mg     1.7     03-29    TPro  6.0  /  Alb  3.5  /  TBili  0.6  /  DBili  x   /  AST  13  /  ALT  17  /  AlkPhos  72  03-29    LIVER FUNCTIONS - ( 29 Mar 2020 06:13 )  Alb: 3.5 g/dL / Pro: 6.0 g/dL / ALK PHOS: 72 U/L / ALT: 17 U/L / AST: 13 U/L / GGT: x           CARDIAC MARKERS ( 28 Mar 2020 11:44 )  x     / x     / 71 U/L / x     / x      CARDIAC MARKERS ( 27 Mar 2020 23:19 )  x     / <0.01 ng/mL / x     / x     / x            MEDICATIONS  (STANDING):  atorvastatin 40 milliGRAM(s) Oral at bedtime  clopidogrel Tablet 75 milliGRAM(s) Oral daily  enoxaparin Injectable 40 milliGRAM(s) SubCutaneous at bedtime  hydroxychloroquine   Oral   lisinopril 20 milliGRAM(s) Oral daily  sodium chloride 0.9%. 1000 milliLiter(s) (60 mL/Hr) IV Continuous <Continuous>        RADIOLOGY & ADDITIONAL TESTS:    Imaging Personally Reviewed:  [ ] YES  [ ] NO    A/P:  1.  Acute Hypoxic Respiratory Failure 2/2 Covid-19 positive    will start HCQ protocol, Vit C 100bid, Zinc, supplemental O2 tokeep pox>92%    2. Syncope - 2/2 dehydration 2/2 covid 19    3.  CAD - stable, no evidence of cardiac etiology    3.  DM II - monitor

## 2020-03-29 NOTE — PROGRESS NOTE ADULT - SUBJECTIVE AND OBJECTIVE BOX
TRICIA SANTIZO  75y, Female  Allergy: No Known Allergies      LAST 24-Hr EVENTS:    VITALS:  T(F): 100 (03-29-20 @ 12:07), Max: 103.3 (03-29-20 @ 06:30)  HR: 91 (03-29-20 @ 12:07)  BP: 148/66 (03-29-20 @ 12:07) (139/63 - 148/66)  RR: 18 (03-29-20 @ 12:07)  SpO2: 93% (03-29-20 @ 12:07)    PHYSICAL EXAM:    GENERAL: NAD  NECK: Supple, No JVD  CHEST/LUNG: CTA b/l  HEART: Regular rate and rhythm  ABDOMEN: Soft, Nontender, Nondistended  EXTREMITIES:  No clubbing, edema absent        TESTS & MEASUREMENTS:    IN: 320 mL / OUT: 0 mL / NET: 320 mL    IN: 1560 mL / OUT: 0 mL / NET: 1560 mL                            11.9   8.47  )-----------( 173      ( 29 Mar 2020 06:13 )             35.4     PT/INR - ( 27 Mar 2020 23:19 )   PT: 13.10 sec;   INR: 1.14 ratio         PTT - ( 27 Mar 2020 23:19 )  PTT:28.3 sec  03-29    138  |  99  |  13  ----------------------------<  191<H>  3.9   |  22  |  0.7    Ca    8.5      29 Mar 2020 06:13  Mg     1.7     03-29    TPro  6.0  /  Alb  3.5  /  TBili  0.6  /  DBili  x   /  AST  13  /  ALT  17  /  AlkPhos  72  03-29    LIVER FUNCTIONS - ( 29 Mar 2020 06:13 )  Alb: 3.5 g/dL / Pro: 6.0 g/dL / ALK PHOS: 72 U/L / ALT: 17 U/L / AST: 13 U/L / GGT: x           CARDIAC MARKERS ( 28 Mar 2020 11:44 )  x     / x     / 71 U/L / x     / x      CARDIAC MARKERS ( 27 Mar 2020 23:19 )  x     / <0.01 ng/mL / x     / x     / x            Culture - Blood (collected 03-27-20 @ 23:00)  Source: .Blood Blood-Peripheral  Preliminary Report (03-29-20 @ 07:01):    No growth to date.    Culture - Blood (collected 03-27-20 @ 23:00)  Source: .Blood Blood-Peripheral  Preliminary Report (03-29-20 @ 07:01):    No growth to date.          ABG & VENT SETTINGS: (when applicable)        RADIOLOGY & ADDITIONAL TESTS:        MEDICATIONS:  MEDICATIONS  (STANDING):  atorvastatin 40 milliGRAM(s) Oral at bedtime  clopidogrel Tablet 75 milliGRAM(s) Oral daily  enoxaparin Injectable 40 milliGRAM(s) SubCutaneous at bedtime  lisinopril 20 milliGRAM(s) Oral daily  sodium chloride 0.9%. 1000 milliLiter(s) (60 mL/Hr) IV Continuous <Continuous>    MEDICATIONS  (PRN):  acetaminophen   Tablet .. 650 milliGRAM(s) Oral every 6 hours PRN Temp greater or equal to 38C (100.4F)

## 2020-03-29 NOTE — PROGRESS NOTE ADULT - SUBJECTIVE AND OBJECTIVE BOX
TRICIA SANTIZO 75y Female  MRN#: [id number]   CODE STATUS: FULL      SUBJECTIVE  Patient is a 75y old Female who presents with a chief complaint of Syncope, Pneumonia  R/O COVID (29 Mar 2020 15:33)    Currently admitted to medicine with the primary diagnosis of Hypoxia     Today is hospital day 1d, and this morning she is laying in bed comfortably and reports no overnight events.     OBJECTIVE  PAST MEDICAL & SURGICAL HISTORY  Hypothyroidism: Hypothyroidism  Essential hypertension: HTN (hypertension)  Arthropathy: Arthritis  Uncontrolled type 2 diabetes mellitus: Diabetes mellitus type II, uncontrolled  Hyperlipidemia: Hyperlipidemia  H/O heart artery stent    ALLERGIES:  No Known Allergies    MEDICATIONS:  STANDING MEDICATIONS  ascorbic acid 1000 milliGRAM(s) Oral two times a day  atorvastatin 40 milliGRAM(s) Oral at bedtime  clopidogrel Tablet 75 milliGRAM(s) Oral daily  enoxaparin Injectable 40 milliGRAM(s) SubCutaneous at bedtime  hydroxychloroquine   Oral   hydroxychloroquine 400 milliGRAM(s) Oral every 12 hours  lisinopril 20 milliGRAM(s) Oral daily  sodium chloride 0.9%. 1000 milliLiter(s) (60 mL/Hr) IV Continuous <Continuous>  zinc sulfate 220 milliGRAM(s) Oral daily    PRN MEDICATIONS  acetaminophen   Tablet .. 650 milliGRAM(s) Oral every 6 hours PRN      VITAL SIGNS: Last 24 Hours  T(C): 39.1 (29 Mar 2020 17:05), Max: 39.6 (29 Mar 2020 06:30)  T(F): 102.4 (29 Mar 2020 17:05), Max: 103.3 (29 Mar 2020 06:30)  HR: 91 (29 Mar 2020 12:07) (91 - 107)  BP: 148/66 (29 Mar 2020 12:07) (139/63 - 148/66)  BP(mean): --  RR: 18 (29 Mar 2020 12:07) (18 - 18)  SpO2: 93% (29 Mar 2020 12:07) (90% - 94%)    LABS:                        11.9   8.47  )-----------( 173      ( 29 Mar 2020 06:13 )             35.4     03-29    138  |  99  |  13  ----------------------------<  191<H>  3.9   |  22  |  0.7    Ca    8.5      29 Mar 2020 06:13  Mg     1.7     03-29    TPro  6.0  /  Alb  3.5  /  TBili  0.6  /  DBili  x   /  AST  13  /  ALT  17  /  AlkPhos  72  03-29    PT/INR - ( 27 Mar 2020 23:19 )   PT: 13.10 sec;   INR: 1.14 ratio         PTT - ( 27 Mar 2020 23:19 )  PTT:28.3 sec      Culture - Blood (collected 27 Mar 2020 23:00)  Source: .Blood Blood-Peripheral  Preliminary Report (29 Mar 2020 07:01):    No growth to date.    Culture - Blood (collected 27 Mar 2020 23:00)  Source: .Blood Blood-Peripheral  Preliminary Report (29 Mar 2020 07:01):    No growth to date.      CARDIAC MARKERS ( 28 Mar 2020 11:44 )  x     / x     / 71 U/L / x     / x      CARDIAC MARKERS ( 27 Mar 2020 23:19 )  x     / <0.01 ng/mL / x     / x     / x          RADIOLOGY:    < from: Xray Chest 1 View- PORTABLE-Routine (03.29.20 @ 13:33) >  Impression:      Increasing bilateral pulmonary opacities.     < end of copied text >      < from: Xray Chest 1 View-PORTABLE IMMEDIATE (03.28.20 @ 12:54) >  Impression:      Unchanged bilateral, interstitial predominant, opacities.    < end of copied text >      < from: Xray Chest 1 View AP/PA (03.28.20 @ 00:40) >  Impression:      Bilateral peripheral and interstitial opacities. Consider viral etiology.    < end of copied text >      < from: CT Head No Cont (03.28.20 @ 00:16) >  Impression:     No CT evidence of acute intracranial pathology.    < end of copied text >      PHYSICAL EXAM:    Completed by hospitalist.      ASSESSMENT & PLAN    Patient is a 76 yo female with medical hx of HTN, DM II, Hypothyroidism HLD BIBA for evaluation of new onset syncope,  cough and fever for 3 days duration     Fever and cough- COVID-19 positive  - Endorses sick contact at home , No recent travel, no known COVID contact   - 3/29 CXR: Increasing bilateral pulmonary opacities; f/u AM CXR  - Flu A/B/RSV negative  - Blood cx NGTD x2  - plaquenil started 3/29, 3/29 qtc 456  - ESR 63, procal 0.48, CRP 16.13 on admission, f/u pending labs  - Tylenol PRN for fevers    New Onset Syncope  -Felt Nauseous and lightheaded prior to fall    -CTH: No acute Intracranial pathology   -Likely due to dehydration in setting of COVID infection  continue Tele?? F/u 2D ECHO, carotid Duplex??  -f/u Orthostatic Vitals    H/O Essential HTN- will monitor for poor control  -c/w Dash diet, Lisinopril     H/O DM II   -Takes Metformin and Glyburide at home   -FS before meals and at bedtime  -Start Insulin regimen if FS are persistently greater than 180     H/O Hypothyroidism  -Off medication??     H/O Hyperlipidemia  -c/w Atorvastatin in house (takes Rosuvastatin 20mg at home)     DVT PPX: Lovenox  GI PPX: not indicated  FULL CODE   Dispo: from Home, remains acute

## 2020-03-29 NOTE — CONSULT NOTE ADULT - ASSESSMENT
Acute hypoxic respiratory failure  Viral Pneumonia  Htn  Syncopal episode      02 via nc  cont hydrochloroquine x 5 days total (while in hospital)  monitor qtc  ldh 220  ferritin 211  obtain procalcitonin  f/u crp and fibrinogen  cont vitamin c  cont tele monitoring- monitor q tx  dvt/g i px  cc time spent 35 min  prognosis guarded

## 2020-03-29 NOTE — CONSULT NOTE ADULT - SUBJECTIVE AND OBJECTIVE BOX
TRICIA SANTIZO  MRN-[id number]    HISTORY OF PRESENT ILLNESS:  76 yo female with medical hx of HTN, DM II, Hypothyroidism HLD BIBA for evaluation of syncope, Pt also endorses Cough generalized weakness and fever of 101F  for 3-4 days duration. Patient states she fell at home while trying to get out of bed, she remembered feeling weak and nauseous  right before the fall. Pt also states that her Son gave her CPR as he couldn't feel her pulse after she collapsed, CPR lasted for about one minute before achieving ROSC. No HT, No AC use, + LOC and AP use    Patient  is also sick at home with similar symptoms, she denied known covid exposure and recent travel. Denies Pain, headache CP, palpitation, Abdominal pain,  Diarrhea , numbness, tingling, urinary symptoms    Vitals in ED remarkable for elevated /90  CXR: B/L Peripheral and Interstitial opacities     still wtih cough also shortness of breath      PMH/PSH:    PAST MEDICAL & SURGICAL HISTORY:  Hypothyroidism: Hypothyroidism  Essential hypertension: HTN (hypertension)  Arthropathy: Arthritis  Uncontrolled type 2 diabetes mellitus: Diabetes mellitus type II, uncontrolled  Hyperlipidemia: Hyperlipidemia  H/O heart artery stent    ALLERGIES:  Allergies    No Known Allergies    Intolerances      SOCIAL HABITS:  negative x 3    FAMILY HISTORY:   n/c      REVIEW OF SYSTEM:  Elements of review of systems are negative or non-applicable except as noted above in HPI section.       HOME MEDICATIONS:  glyBURIDE 5 mg oral tablet  lisinopril 20 mg oral tablet  metFORMIN 500 mg oral tablet, extended release  Plavix 75 mg oral tablet  rosuvastatin 20 mg oral tablet    MEDICATIONS:  MEDICATIONS  (STANDING):  ascorbic acid 1000 milliGRAM(s) Oral two times a day  atorvastatin 40 milliGRAM(s) Oral at bedtime  clopidogrel Tablet 75 milliGRAM(s) Oral daily  enoxaparin Injectable 40 milliGRAM(s) SubCutaneous at bedtime  hydroxychloroquine   Oral   hydroxychloroquine 400 milliGRAM(s) Oral every 12 hours  lisinopril 20 milliGRAM(s) Oral daily  magnesium sulfate  IVPB 2 Gram(s) IV Intermittent once  sodium chloride 0.9%. 1000 milliLiter(s) (60 mL/Hr) IV Continuous <Continuous>  zinc sulfate 220 milliGRAM(s) Oral daily    MEDICATIONS  (PRN):  acetaminophen   Tablet .. 650 milliGRAM(s) Oral every 6 hours PRN Temp greater or equal to 38C (100.4F)        VITALS:   Vital Signs Last 24 Hrs  T(C): 37.8 (29 Mar 2020 12:07), Max: 39.6 (29 Mar 2020 06:30)  T(F): 100 (29 Mar 2020 12:07), Max: 103.3 (29 Mar 2020 06:30)  HR: 91 (29 Mar 2020 12:07) (91 - 107)  BP: 148/66 (29 Mar 2020 12:07) (139/63 - 148/66)  BP(mean): --  RR: 18 (29 Mar 2020 12:07) (18 - 18)  SpO2: 93% (29 Mar 2020 12:07) (90% - 94%)        PHYSICAL EXAM:    GENERAL: NAD  HEAD:  Atraumatic, Normocephalic  NECK: Supple, No JVD  CHEST/LUNG: Crackles  HEART: Regular rate and rhythm; No murmurs  ABDOMEN: Soft, Nontender, Nondistended  EXTREMITIES:  Good peripheral Pulses, No clubbing, cyanosis, or edema      LABS:                        11.9   8.47  )-----------( 173      ( 29 Mar 2020 06:13 )             35.4     03-29    138  |  99  |  13  ----------------------------<  191<H>  3.9   |  22  |  0.7    Ca    8.5      29 Mar 2020 06:13  Mg     1.7     03-29    TPro  6.0  /  Alb  3.5  /  TBili  0.6  /  DBili  x   /  AST  13  /  ALT  17  /  AlkPhos  72  03-29    LIVER FUNCTIONS - ( 29 Mar 2020 06:13 )  Alb: 3.5 g/dL / Pro: 6.0 g/dL / ALK PHOS: 72 U/L / ALT: 17 U/L / AST: 13 U/L / GGT: x           CARDIAC MARKERS ( 28 Mar 2020 11:44 )  x     / x     / 71 U/L / x     / x      CARDIAC MARKERS ( 27 Mar 2020 23:19 )  x     / <0.01 ng/mL / x     / x     / x          PT/INR - ( 27 Mar 2020 23:19 )   PT: 13.10 sec;   INR: 1.14 ratio         PTT - ( 27 Mar 2020 23:19 )  PTT:28.3 sec    Culture - Blood (collected 03-27-20 @ 23:00)  Source: .Blood Blood-Peripheral  Preliminary Report (03-29-20 @ 07:01):    No growth to date.    Culture - Blood (collected 03-27-20 @ 23:00)  Source: .Blood Blood-Peripheral  Preliminary Report (03-29-20 @ 07:01):    No growth to date.            DIAGNOSTIC STUDIES:    < from: Xray Chest 1 View- PORTABLE-Routine (03.29.20 @ 13:33) >    Impression:      Increasing bilateral pulmonary opacities.     < end of copied text >

## 2020-03-30 NOTE — PROGRESS NOTE ADULT - SUBJECTIVE AND OBJECTIVE BOX
DARLYNTRICIA  75y, Female  Allergy: No Known Allergies      LAST 24-Hr EVENTS:  on 50 % fio2, incraesing 0xygen rewquirements  appaers comfortable    VITALS:  T(F): 96.9 (03-30-20 @ 13:19), Max: 100.6 (03-30-20 @ 06:00)  HR: 79 (03-30-20 @ 13:19)  BP: 139/64 (03-30-20 @ 13:19) (80/44 - 146/65)  RR: 20 (03-30-20 @ 13:19)  SpO2: 94% (03-30-20 @ 12:48)    PHYSICAL EXAM:    GENERAL: NAD  NECK: Supple, No JVD  CHEST/LUNG: Crackles  HEART: Regular rate and rhythm  ABDOMEN: Soft, Nontender, Nondistended  EXTREMITIES:  No clubbing, edema absent        TESTS & MEASUREMENTS:    IN: 1560 mL / OUT: 0 mL / NET: 1560 mL                            11.4   11.54 )-----------( 201      ( 30 Mar 2020 06:25 )             34.2       03-30    134<L>  |  97<L>  |  13  ----------------------------<  217<H>  3.6   |  16<L>  |  0.6<L>    Ca    8.3<L>      30 Mar 2020 06:25  Mg     2.1     03-30    TPro  6.0  /  Alb  3.3<L>  /  TBili  0.5  /  DBili  x   /  AST  20  /  ALT  21  /  AlkPhos  75  03-30    LIVER FUNCTIONS - ( 30 Mar 2020 06:25 )  Alb: 3.3 g/dL / Pro: 6.0 g/dL / ALK PHOS: 75 U/L / ALT: 21 U/L / AST: 20 U/L / GGT: x                 COVID-19 PCR: Detected (03-27-20 @ 23:00)    Procalcitonin, Serum: 0.48 ng/mL (03-29-20 @ 06:13)  Procalcitonin, Serum: 0.48 ng/mL (03-28-20 @ 11:44)  Ferritin, Serum: 211 ng/mL (03-28-20 @ 11:44)  dimer    Culture - Blood (collected 03-27-20 @ 23:00)  Source: .Blood Blood-Peripheral  Preliminary Report (03-29-20 @ 07:01):    No growth to date.    Culture - Blood (collected 03-27-20 @ 23:00)  Source: .Blood Blood-Peripheral  Preliminary Report (03-29-20 @ 07:01):    No growth to date.      RADIOLOGY & ADDITIONAL TESTS:  < from: Xray Chest 1 View- PORTABLE-Routine (03.30.20 @ 06:50) >  Impression:      Worsened bilateral opacities.    < end of copied text >      MEDICATIONS:  MEDICATIONS  (STANDING):  ascorbic acid 1000 milliGRAM(s) Oral two times a day  atorvastatin 40 milliGRAM(s) Oral at bedtime  clopidogrel Tablet 75 milliGRAM(s) Oral daily  enoxaparin Injectable 40 milliGRAM(s) SubCutaneous at bedtime  hydroxychloroquine   Oral   hydroxychloroquine 200 milliGRAM(s) Oral every 12 hours  insulin glargine Injectable (LANTUS) 10 Unit(s) SubCutaneous at bedtime  insulin lispro Injectable (HumaLOG) 3 Unit(s) SubCutaneous three times a day before meals  sodium chloride 0.9%. 250 milliLiter(s) (1000 mL/Hr) IV Continuous <Continuous>  zinc sulfate 220 milliGRAM(s) Oral daily    MEDICATIONS  (PRN):  acetaminophen   Tablet .. 650 milliGRAM(s) Oral every 6 hours PRN Temp greater or equal to 38C (100.4F)      < from: 12 Lead ECG (03.29.20 @ 17:10) >  QTC Calculation(Bezet) 456 ms    < end of copied text >

## 2020-03-30 NOTE — CHART NOTE - NSCHARTNOTEFT_GEN_A_CORE
Patient is a 76 yo female with medical hx of HTN, DM II, Hypothyroidism HLD BIBA for evaluation of syncope, Pt also endorses Cough generalized weakness and fever of 101F  for 3-4 days duration. Patient states she fell at home while trying to get out of bed, she remembered feeling weak and nauseous  right before the fall. Pt also states that her Son gave her CPR as he couldn't feel her pulse after she collapsed, CPR lasted for about one minute before achieving ROSC. No HT, No AC use, + LOC and AP use    Patient  is also sick at home with similar symptoms, she denied known covid exposure and recent travel. Denies Pain, headache CP, palpitation, Abdominal pain,  Diarrhea , numbness, tingling, urinary symptoms    Pt tested positive COVID-19 and started on plaquenil 3/29. CXR today with worsened bibasilar opacities. Pt was satting 96% last night on 4L NC, today was only 94% on 50% venti mask. Pt desaturated at approx 630PM and consented to intubation for airway protection. Pt see by critical care and deemed appropriate for intubation. Started on Azithro as per pulm and attending, OK for QTC threshold of 550 for now.    Please call pts Daughter at [phone] with any updates.    F/U     CXR for tube placement  AM labs  Pending CRP+procal  Daily EKG (on plaquenil and azithro)  AM CXR Patient is a 76 yo female with medical hx of HTN, DM II, Hypothyroidism HLD BIBA for evaluation of syncope, Pt also endorses Cough generalized weakness and fever of 101F  for 3-4 days duration. Patient states she fell at home while trying to get out of bed, she remembered feeling weak and nauseous  right before the fall. Pt also states that her Son gave her CPR as he couldn't feel her pulse after she collapsed, CPR lasted for about one minute before achieving ROSC. No HT, No AC use, + LOC and AP use    Patient  is also sick at home with similar symptoms, she denied known covid exposure and recent travel. Denies Pain, headache CP, palpitation, Abdominal pain,  Diarrhea , numbness, tingling, urinary symptoms    Pt tested positive COVID-19 and started on plaquenil 3/29. CXR today with worsened bibasilar opacities. Pt was satting 96% last night on 4L NC, today was only 94% on 50% venti mask. Pt desaturated at approx 630PM to 76-80% and consented to intubation for airway protection. Pt see by critical care and deemed appropriate for intubation. Started on Azithro as per pulm and attending, OK for QTC threshold of 550 for now. Started on Precedex infusion, morphine infusion, and morphine 4mg IV push x1 for sedation. Can add propofol if not adequately sedated.     Please call pts Daughter at [phone] with any updates.    F/U   Precedex infusion, morphine infusion for sedation. Can add propofol if not adequately sedated.   CXR for tube placement  AM labs  Pending CRP+procal  Daily EKG (on plaquenil and azithro)  AM CXR        Fever and cough- COVID-19 positive  acute hypoxic resp failure; pt was satting 96% last night on 4L NC, today was only 94% on 50% venti mask. Pt desaturated at approx 630PM and consented to intubation for airway protection.  - Endorses sick contact at home , No recent travel, no known COVID contact   - 3/30 CXR: Worsened bilateral opacities.  f/u AM CXR  - Flu A/B/RSV negative  - Blood cx NGTD x2  - plaquenil started 3/29, 3/30 qtc 493  - pulm eval appreciated: azithromycin started 3/30- 500mg today followed by 250 for 4 days- f/u AM EKG as QTC increased from 456-->493 since yesterday  - ESR 63, procal 0.48, CRP 16.13 on admission, f/u am labs, crp/procal  - Tylenol PRN for fevers    New Onset Syncope  -Felt Nauseous and lightheaded prior to fall    -CTH: No acute Intracranial pathology   -Likely due to dehydration in setting of COVID infection    H/O Essential HTN- will monitor for poor control  -c/w Dash diet, Lisinopril     H/O DM II   -Takes Metformin and Glyburide at home   -FS before meals and at bedtime  -Start Insulin regimen if FS are persistently greater than 180     H/O Hypothyroidism  -Off medication??     H/O Hyperlipidemia  -c/w Atorvastatin in house (takes Rosuvastatin 20mg at home)     DVT PPX: Lovenox  GI PPX: not indicated  FULL CODE   Dispo: from Home, remains acute

## 2020-03-30 NOTE — PROGRESS NOTE ADULT - ASSESSMENT
Acute hypoxic respiratory failure  Viral Pneumonia  Htn  Syncopal episode      02 via venturi mask- on 50% fio2, maintain sat >92%  cont hydrochloroquine x 5 days total (while in hospital)  start azithromycin  monitor qtc  ldh 220  ferritin 211  procalcitonin 0.48  f/u crp  repeat d dimer and ferrtitin  cont vitamin c  dvt/g i px  cc time spent 35 min  prognosis guarded  if any decline in respiratory status patient will benefit from cc monitoring  d/w primary team

## 2020-03-30 NOTE — PROGRESS NOTE ADULT - SUBJECTIVE AND OBJECTIVE BOX
TRICIA SANTIZO 75y Female  MRN#: [id number]   CODE STATUS: FULL      SUBJECTIVE  Patient is a 75y old Female who presents with a chief complaint of Syncope, Pneumonia  R/O COVID (29 Mar 2020 20:25)    Currently admitted to medicine with the primary diagnosis of Hypoxia     Today is hospital day 2d, and this morning she is laying in bed comfortably and reports no overnight events.     OBJECTIVE  PAST MEDICAL & SURGICAL HISTORY  Hypothyroidism: Hypothyroidism  Essential hypertension: HTN (hypertension)  Arthropathy: Arthritis  Uncontrolled type 2 diabetes mellitus: Diabetes mellitus type II, uncontrolled  Hyperlipidemia: Hyperlipidemia  H/O heart artery stent    ALLERGIES:  No Known Allergies    MEDICATIONS:  STANDING MEDICATIONS  ascorbic acid 1000 milliGRAM(s) Oral two times a day  atorvastatin 40 milliGRAM(s) Oral at bedtime  azithromycin   Tablet 500 milliGRAM(s) Oral once  clopidogrel Tablet 75 milliGRAM(s) Oral daily  dexMEDEtomidine Infusion 0.2 MICROgram(s)/kG/Hr (3.4 mL/Hr) IV Continuous <Continuous>  enoxaparin Injectable 40 milliGRAM(s) SubCutaneous at bedtime  hydroxychloroquine   Oral   hydroxychloroquine 200 milliGRAM(s) Oral every 12 hours  insulin glargine Injectable (LANTUS) 10 Unit(s) SubCutaneous at bedtime  insulin lispro Injectable (HumaLOG) 3 Unit(s) SubCutaneous three times a day before meals  morphine  - Injectable 4 milliGRAM(s) IV Push once  morphine  Infusion 2 mG/Hr (2 mL/Hr) IV Continuous <Continuous>  sodium chloride 0.9%. 250 milliLiter(s) (1000 mL/Hr) IV Continuous <Continuous>  zinc sulfate 220 milliGRAM(s) Oral daily    PRN MEDICATIONS  acetaminophen   Tablet .. 650 milliGRAM(s) Oral every 6 hours PRN      VITAL SIGNS: Last 24 Hours  T(C): 36.1 (30 Mar 2020 13:19), Max: 38.1 (30 Mar 2020 06:00)  T(F): 96.9 (30 Mar 2020 13:19), Max: 100.6 (30 Mar 2020 06:00)  HR: 79 (30 Mar 2020 13:19) (79 - 102)  BP: 139/64 (30 Mar 2020 13:19) (80/44 - 146/65)  BP(mean): --  RR: 20 (30 Mar 2020 13:19) (18 - 20)  SpO2: 94% (30 Mar 2020 12:48) (94% - 99%)    LABS:                        11.4   11.54 )-----------( 201      ( 30 Mar 2020 06:25 )             34.2     03-30    134<L>  |  97<L>  |  13  ----------------------------<  217<H>  3.6   |  16<L>  |  0.6<L>    Ca    8.3<L>      30 Mar 2020 06:25  Mg     2.1     03-30    TPro  6.0  /  Alb  3.3<L>  /  TBili  0.5  /  DBili  x   /  AST  20  /  ALT  21  /  AlkPhos  75  03-30      Culture - Blood (collected 27 Mar 2020 23:00)  Source: .Blood Blood-Peripheral  Preliminary Report (29 Mar 2020 07:01):    No growth to date.    Culture - Blood (collected 27 Mar 2020 23:00)  Source: .Blood Blood-Peripheral  Preliminary Report (29 Mar 2020 07:01):    No growth to date.      RADIOLOGY:    < from: Xray Chest 1 View- PORTABLE-Routine (03.30.20 @ 06:50) >  Impression:      Worsened bilateral opacities.    < end of copied text >      < from: Xray Chest 1 View- PORTABLE-Routine (03.29.20 @ 13:33) >  Impression:      Increasing bilateral pulmonary opacities.     < end of copied text >      < from: Xray Chest 1 View-PORTABLE IMMEDIATE (03.28.20 @ 12:54) >  Impression:      Unchanged bilateral, interstitial predominant, opacities.    < end of copied text >      < from: Xray Chest 1 View AP/PA (03.28.20 @ 00:40) >  Impression:      Bilateral peripheral and interstitial opacities. Consider viral etiology.    < end of copied text >      < from: CT Head No Cont (03.28.20 @ 00:16) >  Impression:     No CT evidence of acute intracranial pathology.    < end of copied text >      PHYSICAL EXAM:    Completed by attending.      ASSESSMENT & PLAN    Patient is a 74 yo female with medical hx of HTN, DM II, Hypothyroidism HLD BIBA for evaluation of new onset syncope,  cough and fever for 3 days duration     Fever and cough- COVID-19 positive  acute hypoxic resp failure; pt was satting 96% last night on 4L NC, today was only 94% on 50% venti mask. Pt desaturated at approx 630PM and consented to intubation for airway protection.  - Endorses sick contact at home , No recent travel, no known COVID contact   - 3/30 CXR: Worsened bilateral opacities.  f/u AM CXR  - Flu A/B/RSV negative  - Blood cx NGTD x2  - plaquenil started 3/29, 3/30 qtc 493  - pulm eval appreciated: azithromycin started 3/30- 500mg today followed by 250 for 4 days- f/u AM EKG as QTC increased from 456-->493 since yesterday  - ESR 63, procal 0.48, CRP 16.13 on admission, f/u am labs, crp/procal  - Tylenol PRN for fevers    New Onset Syncope  -Felt Nauseous and lightheaded prior to fall    -CTH: No acute Intracranial pathology   -Likely due to dehydration in setting of COVID infection    H/O Essential HTN- will monitor for poor control  -c/w Dash diet, Lisinopril     H/O DM II   -Takes Metformin and Glyburide at home   -FS before meals and at bedtime  -Start Insulin regimen if FS are persistently greater than 180     H/O Hypothyroidism  -Off medication??     H/O Hyperlipidemia  -c/w Atorvastatin in house (takes Rosuvastatin 20mg at home)     DVT PPX: Lovenox  GI PPX: not indicated  FULL CODE   Dispo: from Home, remains acute

## 2020-03-31 NOTE — CONSULT NOTE ADULT - ASSESSMENT
ASSESSMENT  74 yo female with medical hx of HTN, DM II, Hypothyroidism HLD BIBA for evaluation of syncope, Pt also endorses Cough generalized weakness and fever of 101F  for 3-4 days duration, fall at home, PEA with ROSC after son gave CPR    IMPRESSION  # COVID-19 PNA, intubated 3/30, shock requiring pressors    Procalcitonin, Serum: 0.48 ng/mL (03-29-20 @ 06:13) <--Procalcitonin, Serum: 0.48 ng/mL (03-28-20 @ 11:44)    CXR b/l interstitial opacities   #Cytokine Release Syndrome     D-Dimer Assay, Quantitative: 1159 ng/mL DDU (03-31-20 @ 05:30)    Ferritin, Serum: 211 ng/mL (03-28-20 @ 11:44)    C-Reactive Protein, Serum: 29.06 mg/dL (03.29.20 @ 06:13)<--C-Reactive Protein, Serum: 16.13 mg/dL (03.28.20 @ 11:44)  #QTC Calculation(Bezet) 467 ms  #DM Hemoglobin A1C, Whole Blood: 9.9 (03-31-20 @ 05:30)      RECOMMENDATIONS  - PLAQUENIL 200mg BID PO x 4 days (Pt is not to be discharged on plaquenil)  - Azithro 250mg PO daily x 4 days   - Monitor QTC with EKG daily  - Supportive care   - Trend CRP , procal   - on steroids    Spectra [id number]

## 2020-03-31 NOTE — PROGRESS NOTE ADULT - SUBJECTIVE AND OBJECTIVE BOX
TRICIA SANTIZO  75y, Female  Allergy: No Known Allergies      LAST 24-Hr EVENTS:    VITALS:  T(F): 98.6 (03-31-20 @ 10:36), Max: 98.6 (03-31-20 @ 10:36)  HR: 88 (03-31-20 @ 10:36)  BP: 107/54 (03-31-20 @ 10:36) (81/49 - 173/72)  RR: 20 (03-31-20 @ 10:36)  SpO2: 100% (03-31-20 @ 10:36)    PHYSICAL EXAM:    GENERAL: NAD  NECK: Supple, No JVD  CHEST/LUNG: CTA b/l  HEART: Regular rate and rhythm  ABDOMEN: Soft, Nontender, Nondistended  EXTREMITIES:  No clubbing, edema absent        TESTS & MEASUREMENTS:    IN: 861 mL / OUT: 190 mL / NET: 671 mL    IN: 454.8 mL / OUT: 75 mL / NET: 379.8 mL                            10.0   13.39 )-----------( 212      ( 31 Mar 2020 05:30 )             30.0       03-31    137  |  100  |  24<H>  ----------------------------<  221<H>  3.8   |  19  |  1.3    Ca    8.4<L>      31 Mar 2020 05:30  Mg     2.0     03-31    TPro  5.6<L>  /  Alb  2.9<L>  /  TBili  0.3  /  DBili  x   /  AST  21  /  ALT  21  /  AlkPhos  76  03-31    LIVER FUNCTIONS - ( 31 Mar 2020 05:30 )  Alb: 2.9 g/dL / Pro: 5.6 g/dL / ALK PHOS: 76 U/L / ALT: 21 U/L / AST: 21 U/L / GGT: x           CARDIAC MARKERS ( 31 Mar 2020 05:30 )  x     / 0.04 ng/mL / 44 U/L / x     / x              D-Dimer Assay, Quantitative: 1159 ng/mL DDU (03-31-20 @ 05:30)  Procalcitonin, Serum: 0.48 ng/mL (03-29-20 @ 06:13)  dimer    Culture - Blood (collected 03-27-20 @ 23:00)  Source: .Blood Blood-Peripheral  Preliminary Report (03-29-20 @ 07:01):    No growth to date.    Culture - Blood (collected 03-27-20 @ 23:00)  Source: .Blood Blood-Peripheral  Preliminary Report (03-29-20 @ 07:01):    No growth to date.          ABG & VENT SETTINGS: (when applicable)  ABG - ( 31 Mar 2020 03:29 )  pH, Arterial: 7.29  pH, Blood: x     /  pCO2: 42    /  pO2: 228   / HCO3: 20    / Base Excess: -6.0  /  SaO2: 100               Mode: AC/ CMV (Assist Control/ Continuous Mandatory Ventilation)  RR (machine): 20  TV (machine): 400  FiO2: 60  PEEP: 10  ITime: 1  MAP: 14  PIP: 28      RADIOLOGY & ADDITIONAL TESTS:      MEDICATIONS:  MEDICATIONS  (STANDING):  ascorbic acid 1000 milliGRAM(s) Oral two times a day  atorvastatin 40 milliGRAM(s) Oral at bedtime  azithromycin   Tablet 250 milliGRAM(s) Oral daily  clopidogrel Tablet 75 milliGRAM(s) Oral daily  heparin  Injectable 5000 Unit(s) SubCutaneous every 8 hours  hydroxychloroquine   Oral   hydroxychloroquine 200 milliGRAM(s) Oral every 12 hours  insulin regular Infusion 1 Unit(s)/Hr (1 mL/Hr) IV Continuous <Continuous>  lactated ringers Bolus 500 milliLiter(s) IV Bolus once  lactated ringers. 1000 milliLiter(s) (110 mL/Hr) IV Continuous <Continuous>  midazolam Infusion 0.02 mG/kG/Hr (1.36 mL/Hr) IV Continuous <Continuous>  morphine  Infusion 2 mG/Hr (2 mL/Hr) IV Continuous <Continuous>  norepinephrine Infusion 0.05 MICROgram(s)/kG/Min (3.19 mL/Hr) IV Continuous <Continuous>  pantoprazole  Injectable 40 milliGRAM(s) IV Push daily  senna 2 Tablet(s) Oral at bedtime  zinc sulfate 220 milliGRAM(s) Oral daily    MEDICATIONS  (PRN):  acetaminophen   Tablet .. 650 milliGRAM(s) Oral every 6 hours PRN Temp greater or equal to 38C (100.4F) DARLYNTRICIA JUAN  75y, Female  Allergy: No Known Allergies      LAST 24-Hr EVENTS:  pt intubated  remains afebrile    VITALS:  T(F): 98.6 (03-31-20 @ 10:36), Max: 98.6 (03-31-20 @ 10:36)  HR: 88 (03-31-20 @ 10:36)  BP: 107/54 (03-31-20 @ 10:36) (81/49 - 173/72)  RR: 20 (03-31-20 @ 10:36)  SpO2: 100% (03-31-20 @ 10:36)    PHYSICAL EXAM:    GENERAL: vented  NECK: Supple, No JVD  CHEST/LUNG: b/l air entry  HEART: Regular rate and rhythm  ABDOMEN: Soft, Nontender, Nondistended  EXTREMITIES:  No clubbing, edema absent        TESTS & MEASUREMENTS:    IN: 861 mL / OUT: 190 mL / NET: 671 mL    IN: 454.8 mL / OUT: 75 mL / NET: 379.8 mL                            10.0   13.39 )-----------( 212      ( 31 Mar 2020 05:30 )             30.0       03-31    137  |  100  |  24<H>  ----------------------------<  221<H>  3.8   |  19  |  1.3    Ca    8.4<L>      31 Mar 2020 05:30  Mg     2.0     03-31    TPro  5.6<L>  /  Alb  2.9<L>  /  TBili  0.3  /  DBili  x   /  AST  21  /  ALT  21  /  AlkPhos  76  03-31    LIVER FUNCTIONS - ( 31 Mar 2020 05:30 )  Alb: 2.9 g/dL / Pro: 5.6 g/dL / ALK PHOS: 76 U/L / ALT: 21 U/L / AST: 21 U/L / GGT: x           CARDIAC MARKERS ( 31 Mar 2020 05:30 )  x     / 0.04 ng/mL / 44 U/L / x     / x              D-Dimer Assay, Quantitative: 1159 ng/mL DDU (03-31-20 @ 05:30)  Procalcitonin, Serum: 0.48 ng/mL (03-29-20 @ 06:13)  dimer    Culture - Blood (collected 03-27-20 @ 23:00)  Source: .Blood Blood-Peripheral  Preliminary Report (03-29-20 @ 07:01):    No growth to date.    Culture - Blood (collected 03-27-20 @ 23:00)  Source: .Blood Blood-Peripheral  Preliminary Report (03-29-20 @ 07:01):    No growth to date.          ABG & VENT SETTINGS: (when applicable)  ABG - ( 31 Mar 2020 03:29 )  pH, Arterial: 7.29  pH, Blood: x     /  pCO2: 42    /  pO2: 228   / HCO3: 20    / Base Excess: -6.0  /  SaO2: 100               Mode: AC/ CMV (Assist Control/ Continuous Mandatory Ventilation)  RR (machine): 20  TV (machine): 400  FiO2: 60  PEEP: 10  ITime: 1  MAP: 14  PIP: 28      RADIOLOGY & ADDITIONAL TESTS:  < from: Xray Chest 1 View- PORTABLE-Routine (03.31.20 @ 05:35) >    Impression:      Slightly decreased bilateral opacities.     < end of copied text >      MEDICATIONS:  MEDICATIONS  (STANDING):  ascorbic acid 1000 milliGRAM(s) Oral two times a day  atorvastatin 40 milliGRAM(s) Oral at bedtime  azithromycin   Tablet 250 milliGRAM(s) Oral daily  clopidogrel Tablet 75 milliGRAM(s) Oral daily  heparin  Injectable 5000 Unit(s) SubCutaneous every 8 hours  hydroxychloroquine   Oral   hydroxychloroquine 200 milliGRAM(s) Oral every 12 hours  insulin regular Infusion 1 Unit(s)/Hr (1 mL/Hr) IV Continuous <Continuous>  lactated ringers Bolus 500 milliLiter(s) IV Bolus once  lactated ringers. 1000 milliLiter(s) (110 mL/Hr) IV Continuous <Continuous>  midazolam Infusion 0.02 mG/kG/Hr (1.36 mL/Hr) IV Continuous <Continuous>  morphine  Infusion 2 mG/Hr (2 mL/Hr) IV Continuous <Continuous>  norepinephrine Infusion 0.05 MICROgram(s)/kG/Min (3.19 mL/Hr) IV Continuous <Continuous>  pantoprazole  Injectable 40 milliGRAM(s) IV Push daily  senna 2 Tablet(s) Oral at bedtime  zinc sulfate 220 milliGRAM(s) Oral daily    MEDICATIONS  (PRN):  acetaminophen   Tablet .. 650 milliGRAM(s) Oral every 6 hours PRN Temp greater or equal to 38C (100.4F)

## 2020-03-31 NOTE — PROGRESS NOTE ADULT - ASSESSMENT
· Assessment		  Acute hypoxic respiratory failure  Viral Pneumonia  Htn  Syncopal episode      02 via venturi mask- on 50% fio2, maintain sat >92%  cont hydrochloroquine x 5 days total (while in hospital)  start azithromycin  monitor qtc  ldh 220  ferritin 211  procalcitonin 0.48  f/u crp  repeat d dimer and ferrtitin  cont vitamin c  dvt/g i px  cc time spent 35 min  prognosis guarded  if any decline in respiratory status patient will benefit from cc monitoring  d/w primary team · Assessment		  Acute hypoxic respiratory failure  Viral Pneumonia  Htn  Syncopal episode  Anju      cont sedation  Ards net protocol  cont hydrochloroquine x 5 days total (while in hospital)  start heparin gtt  monitor coags  cont azithromycin  monitor qtc  enteric feeds  repeat d dimer and ferrtitin  cont vitamin c  dec fio2 60 percent  obtain ua urine sodium and urine cr  dec lr to 70 cc per hour  dvt/g i px  cc time spent 35 min  prognosis guarded  cont cc monitoring  d/w cc PA at length

## 2020-03-31 NOTE — PROGRESS NOTE ADULT - SUBJECTIVE AND OBJECTIVE BOX
Patient is a 75y old  Female who presents with a chief complaint of Syncope, Pneumonia  R/O COVID (31 Mar 2020 13:43)      HPI:  Patient is a 76 yo female with medical hx of HTN, DM II, Hypothyroidism HLD BIBA for evaluation of syncope, Pt also endorses Cough generalized weakness and fever of 101F  for 3-4 days duration. Patient states she fell at home while trying to get out of bed, she remembered feeling weak and nauseous  right before the fall. Pt also states that her Son gave her CPR as he couldn't feel her pulse after she collapsed, CPR lasted for about one minute before achieving ROSC. No HT, No AC use, + LOC and AP use    Patient  is also sick at home with similar symptoms, she denied known covid exposure and recent travel. Denies Pain, headache CP, palpitation, Abdominal pain,  Diarrhea , numbness, tingling, urinary symptoms    Vitals in ED remarkable for elevated /90  CXR: B/L Peripheral and Interstitial opacities (28 Mar 2020 03:24)      PAST MEDICAL & SURGICAL HISTORY:  Hypothyroidism: Hypothyroidism  Essential hypertension: HTN (hypertension)  Arthropathy: Arthritis  Uncontrolled type 2 diabetes mellitus: Diabetes mellitus type II, uncontrolled  Hyperlipidemia: Hyperlipidemia  H/O heart artery stent      PREVIOUS DIAGNOSTIC TESTING:      ECHO  FINDINGS:    STRESS  FINDINGS:    CATHETERIZATION  FINDINGS:    MEDICATIONS  (STANDING):  ascorbic acid 1000 milliGRAM(s) Oral two times a day  atorvastatin 40 milliGRAM(s) Oral at bedtime  azithromycin   Tablet 250 milliGRAM(s) Oral daily  clopidogrel Tablet 75 milliGRAM(s) Oral daily  guaifenesin/dextromethorphan  Syrup 10 milliLiter(s) Oral every 8 hours  heparin  Injectable 5000 Unit(s) SubCutaneous every 8 hours  hydroxychloroquine   Oral   hydroxychloroquine 200 milliGRAM(s) Oral every 12 hours  insulin regular Infusion 1 Unit(s)/Hr (1 mL/Hr) IV Continuous <Continuous>  lactated ringers Bolus 500 milliLiter(s) IV Bolus once  lactated ringers. 1000 milliLiter(s) (110 mL/Hr) IV Continuous <Continuous>  midazolam Infusion 0.02 mG/kG/Hr (1.36 mL/Hr) IV Continuous <Continuous>  morphine  Infusion 2 mG/Hr (2 mL/Hr) IV Continuous <Continuous>  norepinephrine Infusion 0.05 MICROgram(s)/kG/Min (3.19 mL/Hr) IV Continuous <Continuous>  pantoprazole  Injectable 40 milliGRAM(s) IV Push daily  senna 2 Tablet(s) Oral at bedtime  zinc sulfate 220 milliGRAM(s) Oral daily    MEDICATIONS  (PRN):  acetaminophen   Tablet .. 650 milliGRAM(s) Oral every 6 hours PRN Temp greater or equal to 38C (100.4F)  acetaminophen   Tablet .. 650 milliGRAM(s) Oral every 6 hours PRN Temp greater or equal to 38C (100.4F)      FAMILY HISTORY:      SOCIAL HISTORY:  CIGARETTES:    ALCOHOL:    REVIEW OF SYSTEMS:  CONSTITUTIONAL: No fever, weight loss, or fatigue  NECK: No pain or stiffness  RESPIRATORY: No cough, wheezing, chills or hemoptysis; No shortness of breath  CARDIOVASCULAR: No chest pain, palpitations, dizziness, or leg swelling  GASTROINTESTINAL: No abdominal or epigastric pain. No nausea, vomiting, or hematemesis; No diarrhea or constipation. No melena or hematochezia.  GENITOURINARY: No dysuria, frequency, hematuria, or incontinence  NEUROLOGICAL: No headaches, memory loss, loss of strength, numbness, or tremors  SKIN: No itching, burning, rashes, or lesions   ENDOCRINE: No heat or cold intolerance; No hair loss  MUSCULOSKELETAL: No joint pain or swelling; No muscle, back, or extremity pain  HEME/LYMPH: No easy bruising, or bleeding gums          Vital Signs Last 24 Hrs  T(C): 37.3 (31 Mar 2020 22:09), Max: 38.6 (31 Mar 2020 19:00)  T(F): 99.2 (31 Mar 2020 22:09), Max: 101.5 (31 Mar 2020 19:00)  HR: 111 (31 Mar 2020 22:09) (81 - 122)  BP: 132/50 (31 Mar 2020 22:09) (82/52 - 160/67)  BP(mean): 82 (31 Mar 2020 22:09) (62 - 96)  RR: 24 (31 Mar 2020 22:09) (19 - 26)  SpO2: 92% (31 Mar 2020 22:09) (92% - 100%)        PHYSICAL EXAM:  GENERAL: doing slightly better  CHEST/LUNG: b/l  rhonchi,   HEART: Regular rate and rhythm; No murmurs, rubs, or gallops  ABDOMEN: Soft, Nontender, Nondistended; Bowel sounds present  EXTREMITIES:  2+ Peripheral Pulses, No clubbing, cyanosis, or edema  SKIN: No rashes or lesions    INTERPRETATION OF TELEMETRY:    ECG:    I&O's Detail    30 Mar 2020 07:01  -  31 Mar 2020 07:00  --------------------------------------------------------  IN:    Lactated Ringers IV Bolus: 500 mL    lactated ringers.: 330 mL    midazolam Infusion: 6 mL    morphine  Infusion: 23 mL    norepinephrine Infusion: 2 mL  Total IN: 861 mL    OUT:    Indwelling Catheter - Urethral: 150 mL    Voided: 40 mL  Total OUT: 190 mL    Total NET: 671 mL      31 Mar 2020 07:01  -  31 Mar 2020 22:53  --------------------------------------------------------  IN:    insulin regular Infusion: 10 mL    IV PiggyBack: 750 mL    lactated ringers.: 990 mL    lactated ringers.: 330 mL    midazolam Infusion: 14.4 mL    morphine  Infusion: 21 mL    Oral Fluid: 120 mL  Total IN: 2235.4 mL    OUT:    Indwelling Catheter - Urethral: 355 mL  Total OUT: 355 mL    Total NET: 1880.4 mL          LABS:                        10.0   13.39 )-----------( 212      ( 31 Mar 2020 05:30 )             30.0     03-31    137  |  102  |  23<H>  ----------------------------<  119<H>  4.0   |  21  |  1.0    Ca    8.4<L>      31 Mar 2020 17:51  Phos  3.2     03-31  Mg     1.9     03-31    TPro  5.6<L>  /  Alb  2.9<L>  /  TBili  0.3  /  DBili  x   /  AST  21  /  ALT  21  /  AlkPhos  76  03-31    CARDIAC MARKERS ( 31 Mar 2020 11:30 )  x     / 0.03 ng/mL / 40 U/L / x     / 2.1 ng/mL  CARDIAC MARKERS ( 31 Mar 2020 05:30 )  x     / 0.04 ng/mL / 44 U/L / x     / x          PT/INR - ( 31 Mar 2020 17:51 )   PT: 14.30 sec;   INR: 1.24 ratio         PTT - ( 31 Mar 2020 17:51 )  PTT:23.9 sec    I&O's Summary    30 Mar 2020 07:01  -  31 Mar 2020 07:00  --------------------------------------------------------  IN: 861 mL / OUT: 190 mL / NET: 671 mL    31 Mar 2020 07:01  -  31 Mar 2020 22:53  --------------------------------------------------------  IN: 2235.4 mL / OUT: 355 mL / NET: 1880.4 mL        RADIOLOGY & ADDITIONAL STUDIES:

## 2020-03-31 NOTE — CONSULT NOTE ADULT - SUBJECTIVE AND OBJECTIVE BOX
TRICIA SANTIZO  75y, Female  Allergy: No Known Allergies      CHIEF COMPLAINT: Syncope, Pneumonia  R/O COVID Acute respiratory failure (31 Mar 2020 03:38)      LOS  3d    HPI:  Patient is a 76 yo female with medical hx of HTN, DM II, Hypothyroidism HLD BIBA for evaluation of syncope, Pt also endorses Cough generalized weakness and fever of 101F  for 3-4 days duration. Patient states she fell at home while trying to get out of bed, she remembered feeling weak and nauseous  right before the fall. Pt also states that her Son gave her CPR as he couldn't feel her pulse after she collapsed, CPR lasted for about one minute before achieving ROSC. No HT, No AC use, + LOC and AP use    Patient  is also sick at home with similar symptoms, she denied known covid exposure and recent travel. Denies Pain, headache CP, palpitation, Abdominal pain,  Diarrhea , numbness, tingling, urinary symptoms    Vitals in ED remarkable for elevated /90  CXR: B/L Peripheral and Interstitial opacities (28 Mar 2020 03:24)        PAST MEDICAL & SURGICAL HISTORY:  Hypothyroidism: Hypothyroidism  Essential hypertension: HTN (hypertension)  Arthropathy: Arthritis  Uncontrolled type 2 diabetes mellitus: Diabetes mellitus type II, uncontrolled  Hyperlipidemia: Hyperlipidemia  H/O heart artery stent      FAMILY HISTORY  non-contributory     SOCIAL HISTORY  Social History:        ROS  unable to obtain history secondary to patient's mental status and/or sedation     VITALS:  T(F): 98.6, Max: 98.6 (03-31-20 @ 10:36)  HR: 88  BP: 107/54  RR: 20Vital Signs Last 24 Hrs  T(C): 37 (31 Mar 2020 10:36), Max: 37 (31 Mar 2020 10:36)  T(F): 98.6 (31 Mar 2020 10:36), Max: 98.6 (31 Mar 2020 10:36)  HR: 88 (31 Mar 2020 10:36) (81 - 103)  BP: 107/54 (31 Mar 2020 10:36) (81/49 - 173/72)  BP(mean): 76 (31 Mar 2020 10:36) (62 - 76)  RR: 20 (31 Mar 2020 10:36) (19 - 21)  SpO2: 100% (31 Mar 2020 10:36) (97% - 100%)    PHYSICAL EXAM:  Gen: intubated  HEENT: Normocephalic, atraumatic  Neck: supple, no lymphadenopathy  CV: Regular rate & regular rhythm  Lungs: decreased BS at bases, no fremitus  Abdomen: Soft, BS present  Ext: Warm, well perfused  Neuro: sedated  Skin: no rash, no erythema  Lines: no phlebitis    TESTS & MEASUREMENTS:                        10.0   13.39 )-----------( 212      ( 31 Mar 2020 05:30 )             30.0     03-31    137  |  100  |  24<H>  ----------------------------<  221<H>  3.8   |  19  |  1.3    Ca    8.4<L>      31 Mar 2020 05:30  Mg     2.0     03-31    TPro  5.6<L>  /  Alb  2.9<L>  /  TBili  0.3  /  DBili  x   /  AST  21  /  ALT  21  /  AlkPhos  76  03-31    eGFR if Non African American: 41 mL/min/1.73M2 (03-31-20 @ 05:30)  eGFR if African American: 47 mL/min/1.73M2 (03-31-20 @ 05:30)    LIVER FUNCTIONS - ( 31 Mar 2020 05:30 )  Alb: 2.9 g/dL / Pro: 5.6 g/dL / ALK PHOS: 76 U/L / ALT: 21 U/L / AST: 21 U/L / GGT: x               Culture - Blood (collected 03-27-20 @ 23:00)  Source: .Blood Blood-Peripheral  Preliminary Report (03-29-20 @ 07:01):    No growth to date.    Culture - Blood (collected 03-27-20 @ 23:00)  Source: .Blood Blood-Peripheral  Preliminary Report (03-29-20 @ 07:01):    No growth to date.        Blood Gas Venous - Lactate: 1.6 mmoL/L (03-27-20 @ 23:21)      INFECTIOUS DISEASES TESTING  Procalcitonin, Serum: 0.48 ng/mL (03-29-20 @ 06:13)  Procalcitonin, Serum: 0.48 ng/mL (03-28-20 @ 11:44)  COVID-19 PCR: Detected (03-27-20 @ 23:00)      RADIOLOGY & ADDITIONAL TESTS:  I have personally reviewed the last Chest xray  CXR      CT      CARDIOLOGY TESTING  12 Lead ECG:   Systolic  mmHg    Diastolic BP 42 mmHg    Ventricular Rate 92 BPM    Atrial Rate 92 BPM    P-R Interval 196 ms    QRS Duration 80 ms    Q-T Interval 378 ms    QTC Calculation(Bezet) 467 ms    P Axis 80 degrees    R Axis 75 degrees    T Axis 81 degrees    Diagnosis Line Normal sinus rhythm  Septal infarct , age undetermined  Abnormal ECG    Confirmed by RAMYA SILVA MD ([id number]) on 3/31/2020 10:47:45 AM (03-31-20 @ 10:19)  12 Lead ECG:   Ventricular Rate 101 BPM    Atrial Rate 101 BPM    P-R Interval 188 ms    QRS Duration 76 ms    Q-T Interval 352 ms    QTC Calculation(Bezet) 456 ms    P Axis 46 degrees    R Axis 32 degrees    T Axis 44 degrees    Diagnosis Line Sinus tachycardia  Low voltage QRS  Borderline ECG    Confirmed by RAMYA SILVA MD [id number]) on 3/30/2020 2:23:20 PM (03-29-20 @ 17:10)      MEDICATIONS  ascorbic acid 1000  atorvastatin 40  azithromycin   Tablet 250  clopidogrel Tablet 75  heparin  Injectable 5000  hydroxychloroquine   hydroxychloroquine 200  insulin regular Infusion 1  lactated ringers. 1000  methylPREDNISolone sodium succinate Injectable 40  midazolam Infusion 0.02  morphine  Infusion 2  norepinephrine Infusion 0.05  pantoprazole  Injectable 40  senna 2  zinc sulfate 220      Weight  Weight (kg): 68 (03-27-20 @ 20:32)    ANTIBIOTICS:  azithromycin   Tablet 250 milliGRAM(s) Oral daily  hydroxychloroquine   Oral   hydroxychloroquine 200 milliGRAM(s) Oral every 12 hours      ALLERGIES:  No Known Allergies

## 2020-03-31 NOTE — CONSULT NOTE ADULT - SUBJECTIVE AND OBJECTIVE BOX
TRICIA SANTIZO  [id number]  75y Female    Indication for ICU admission:  acute respiratory failure COVID-19 positive, pneumonia, HD instability  Admit Date: 3/27  ICU Date:  3/31    No Known Allergies    PAST MEDICAL & SURGICAL HISTORY:  Hypothyroidism: Hypothyroidism  Essential hypertension: HTN (hypertension)  Arthropathy: Arthritis  Uncontrolled type 2 diabetes mellitus: Diabetes mellitus type II, uncontrolled  Hyperlipidemia: Hyperlipidemia  H/O heart artery stent    Home Medications:  glyBURIDE 5 mg oral tablet: 1 tab(s) orally once a day (28 Mar 2020 05:20)  lisinopril 20 mg oral tablet: 1 tab(s) orally once a day (09 May 2018 12:13)  metFORMIN 500 mg oral tablet, extended release: 2 tab(s) orally 2 times a day (28 Mar 2020 05:19)  Plavix 75 mg oral tablet: 1 tab(s) orally once a day (09 May 2018 12:13)  rosuvastatin 20 mg oral tablet: 1 tab(s) orally once a day (28 Mar 2020 05:19)        24HRS EVENT:  76 y/o female admitted 3/29 with syncopal episode vs cardiopulmonary arrest reportedly got chest compressions in the field by son with ROSC)  She had fever, cough and mailaise--tested  Covid positive  Over the past 3 days ahas had increasing O2 requirements until she required intubation  She was placed on sedation with propofol and became hypotensive and transferred to the PACU- critical care area            DVT PTX: Lovenox, plavix    GI PTX:  Protonix    ***Tubes/Lines/Drains  ***  Peripheral IV  Central Venous Line     	Date 3/31 left ij  Arterial Line		                Date   [] PICC:         	[] Midline		[] Mediport             Urinary Catheter		Indication: Strict I&O    Date Placed: 3/31      REVIEW OF SYSTEMS    [ ] A ten-point review of systems was otherwise negative except as noted.  [x] Due to altered mental status/intubation, subjective information were not able to be obtained from the patient. History was obtained, to the extent possible, from review of the chart and collateral sources of information.    Daily     Daily Weight in k.4 (30 Mar 2020 06:00)    CURRENT MEDS:  Neurologic Medications  acetaminophen   Tablet .. 650 milliGRAM(s) Oral every 6 hours PRN Temp greater or equal to 38C (100.4F)  midazolam Infusion 0.02 mG/kG/Hr IV Continuous <Continuous>  morphine  Infusion 2 mG/Hr IV Continuous <Continuous>    Gastrointestinal Medications  ascorbic acid 1000 milliGRAM(s) Oral two times a day  senna 2 Tablet(s) Oral at bedtime  sodium chloride 0.9%. 250 milliLiter(s) IV Continuous <Continuous>  zinc sulfate 220 milliGRAM(s) Oral daily    Hematologic/Oncologic Medications  clopidogrel Tablet 75 milliGRAM(s) Oral daily  enoxaparin Injectable 40 milliGRAM(s) SubCutaneous at bedtime    Antimicrobial/Immunologic Medications  azithromycin   Tablet 250 milliGRAM(s) Oral daily  hydroxychloroquine   Oral   hydroxychloroquine 200 milliGRAM(s) Oral every 12 hours    Endocrine/Metabolic Medications  atorvastatin 40 milliGRAM(s) Oral at bedtime  insulin glargine Injectable (LANTUS) 10 Unit(s) SubCutaneous at bedtime  insulin lispro Injectable (HumaLOG) 3 Unit(s) SubCutaneous three times a day before meal      ICU Vital Signs Last 24 Hrs  T(C): 36.1 (30 Mar 2020 13:19), Max: 38.1 (30 Mar 2020 06:00)  T(F): 96.9 (30 Mar 2020 13:19), Max: 100.6 (30 Mar 2020 06:00)  HR: 81 (31 Mar 2020 03:30) (79 - 103)  BP: 82/52 (31 Mar 2020 03:30) (80/44 - 173/72)  BP(mean): 62 (31 Mar 2020 03:30) (62 - 64)      RR: 21 (31 Mar 2020 03:30) (18 - 21)  SpO2: 100% (31 Mar 2020 03:30) (94% - 100%)    Mode: AC/ CMV (Assist Control/ Continuous Mandatory Ventilation)  RR (machine): 20  TV (machine): 400  FiO2: 100  PEEP: 10  ITime: 1  PIP: 27    ABG - ( 31 Mar 2020 01:51 )  pH, Arterial: 7.30  pH, Blood: x     /  pCO2: 42    /  pO2: 176   / HCO3: 21    / Base Excess: -5.6  /  SaO2: 99            I&O's Summary    30 Mar 2020 07:01  -  31 Mar 2020 04:05  --------------------------------------------------------  IN: 21.6 mL / OUT: 40 mL / NET: -18.4 mL      I&O's Detail    30 Mar 2020 07:01  -  31 Mar 2020 04:05  --------------------------------------------------------  IN:    midazolam Infusion: 3.6 mL    morphine  Infusion: 18 mL  Total IN: 21.6 mL    OUT:    Voided: 40 mL  Total OUT: 40 mL    Total NET: -18.4 mL          PHYSICAL EXAM:    General/Neuro  RASS:             GCS:     = E   / V   / M      Deficits:                             alert & oriented x 3, no focal deficits  Pupils:    Lungs:      clear to auscultation, Normal expansion/effort.     Cardiovascular : S1, S2.  Regular rate and rhythm.  Peripheral edema   Cardiac Rhythm: Normal Sinus Rhythm    GI: Abdomen soft, Non-tender, Non-distended.    Gastrostomy / Jejunostomy tube in place.  Nasogastric tube in place.  Colostomy / Ileostomy.    Wound:    Extremities: Extremities warm, pink, well-perfused. Pulses:Rt     Lt    Derm: Good skin turgor, no skin breakdown.      :       Deutsch catheter in place.      CXR:     LABS:  CAPILLARY BLOOD GLUCOSE      POCT Blood Glucose.: 161 mg/dL (30 Mar 2020 16:53)  POCT Blood Glucose.: 211 mg/dL (30 Mar 2020 11:07)  POCT Blood Glucose.: 211 mg/dL (30 Mar 2020 07:28)                          11.4   11.54 )-----------( 201      ( 30 Mar 2020 06:25 )             34.2       0330    134<L>  |  97<L>  |  13  ----------------------------<  217<H>  3.6   |  16<L>  |  0.6<L>    Ca    8.3<L>      30 Mar 2020 06:25  Mg     2.1         TPro  6.0  /  Alb  3.3<L>  /  TBili  0.5  /  DBili  x   /  AST  20  /  ALT  21  /  AlkPhos  75   TRICIA SANTIZO  [id number]  75y Female    Indication for ICU admission:  acute respiratory failure COVID-19 positive, pneumonia, HD instability  Admit Date: 3/27  ICU Date:  3/31    No Known Allergies    PAST MEDICAL & SURGICAL HISTORY:  Hypothyroidism: Hypothyroidism  Essential hypertension: HTN (hypertension)  Arthropathy: Arthritis  Uncontrolled type 2 diabetes mellitus: Diabetes mellitus type II, uncontrolled  Hyperlipidemia: Hyperlipidemia  H/O heart artery stent    Home Medications:  glyBURIDE 5 mg oral tablet: 1 tab(s) orally once a day (28 Mar 2020 05:20)  lisinopril 20 mg oral tablet: 1 tab(s) orally once a day (09 May 2018 12:13)  metFORMIN 500 mg oral tablet, extended release: 2 tab(s) orally 2 times a day (28 Mar 2020 05:19)  Plavix 75 mg oral tablet: 1 tab(s) orally once a day (09 May 2018 12:13)  rosuvastatin 20 mg oral tablet: 1 tab(s) orally once a day (28 Mar 2020 05:19)        24HRS EVENT:  76 y/o female admitted 3/29 with syncopal episode vs cardiopulmonary arrest reportedly got chest compressions in the field by son with ROSC)  She had fever, cough and mailaise--tested  Covid positive  Over the past 3 days has had increasing O2 requirements until she required intubation  She was placed on sedation with propofol and became hypotensive and transferred to the PACU- critical care area  1 Liter bolus was given   Risk factors for worsening clinical condition: DMII, CAD s/p Stent    1) neuro sedation w/ versed/ morphine     titrate to RASS of -1     Avoid propofol and precedex to prevent hypotension    2) Pulm      Acute respiratory failure      Intubated with a 7.5 tube      can wean fio2 to 80%      Daily CXR      Avoid fluid overload    2) Cards-      CAD s/p cardaic stent- continue ASA, not on B-blockers at home      Hypotension- responded to fluids      Serial cardiac enzymes      Arterial line inserted, /57      H/O HTN on lisinopril at home- discontinue       Avoid  ACE or ARBS      3)  GI-       NPO       LR @110/hr       NG to LCWS    4) Renal      Preserved renal function      Gentle hydration with LR @ 100 ml/hr      BUN/cr 13/0.6      Hypokalemia at 0430 on 3/30, repeat BMP, IP, Mag     5) Heme-      H&H stable      Lovenox for DVT PPX- can switch to HSQ if creatine increases      SCD    6) ID-      T- max 96.9      COVID -19 + with respiratory failure      Procalcitonin, Serum (20 @ 06:13)      Procalcitonin, Serum: 0.48: Procalcitonin (PCT) Interpretation (ng/mL) - Diagnosis of systemic      CRP- 29      Procalciton levels, CRP, CBC with diff      Mild leukocytosis with leukopenia      Azithromycin and Plaquenil    7) Endocrine     IDDM on lantus at home     d/c lantus, Start ISS- if persistant hypergylcemia switch to Insulin gtt      H/O Hypothyroidism not on medications-  TSH 2.2                DVT PTX: Lovenox, plavix    GI PTX:  Protonix    ***Tubes/Lines/Drains  ***  Peripheral IV  Central Venous Line     	Date 3/31 left ij  Arterial Line		                Date   [] PICC:         	[] Midline		[] Mediport             Urinary Catheter		Indication: Strict I&O    Date Placed: 3/31      REVIEW OF SYSTEMS    [ ] A ten-point review of systems was otherwise negative except as noted.  [x] Due to altered mental status/intubation, subjective information were not able to be obtained from the patient. History was obtained, to the extent possible, from review of the chart and collateral sources of information.    Daily     Daily Weight in k.4 (30 Mar 2020 06:00)    CURRENT MEDS:  Neurologic Medications  acetaminophen   Tablet .. 650 milliGRAM(s) Oral every 6 hours PRN Temp greater or equal to 38C (100.4F)  midazolam Infusion 0.02 mG/kG/Hr IV Continuous <Continuous>  morphine  Infusion 2 mG/Hr IV Continuous <Continuous>    Gastrointestinal Medications  ascorbic acid 1000 milliGRAM(s) Oral two times a day  senna 2 Tablet(s) Oral at bedtime  sodium chloride 0.9%. 250 milliLiter(s) IV Continuous <Continuous>  zinc sulfate 220 milliGRAM(s) Oral daily    Hematologic/Oncologic Medications  clopidogrel Tablet 75 milliGRAM(s) Oral daily  enoxaparin Injectable 40 milliGRAM(s) SubCutaneous at bedtime    Antimicrobial/Immunologic Medications  azithromycin   Tablet 250 milliGRAM(s) Oral daily  hydroxychloroquine   Oral   hydroxychloroquine 200 milliGRAM(s) Oral every 12 hours    Endocrine/Metabolic Medications  atorvastatin 40 milliGRAM(s) Oral at bedtime  insulin glargine Injectable (LANTUS) 10 Unit(s) SubCutaneous at bedtime  insulin lispro Injectable (HumaLOG) 3 Unit(s) SubCutaneous three times a day before meal      ICU Vital Signs Last 24 Hrs  T(C): 36.1 (30 Mar 2020 13:19), Max: 38.1 (30 Mar 2020 06:00)  T(F): 96.9 (30 Mar 2020 13:19), Max: 100.6 (30 Mar 2020 06:00)  HR: 81 (31 Mar 2020 03:30) (79 - 103)  BP: 82/52 (31 Mar 2020 03:30) (80/44 - 173/72)  BP(mean): 62 (31 Mar 2020 03:30) (62 - 64)      RR: 21 (31 Mar 2020 03:30) (18 - 21)  SpO2: 100% (31 Mar 2020 03:30) (94% - 100%)    Mode: AC/ CMV (Assist Control/ Continuous Mandatory Ventilation)  RR (machine): 20  TV (machine): 400  FiO2: 100  PEEP: 10  ITime: 1  PIP: 27    ABG - ( 31 Mar 2020 01:51 )  pH, Arterial: 7.30  pH, Blood: x     /  pCO2: 42    /  pO2: 176   / HCO3: 21    / Base Excess: -5.6  /  SaO2: 99            I&O's Summary    30 Mar 2020 07:  -  31 Mar 2020 04:05  --------------------------------------------------------  IN: 21.6 mL / OUT: 40 mL / NET: -18.4 mL      I&O's Detail    30 Mar 2020 07:  -  31 Mar 2020 04:05  --------------------------------------------------------  IN:    midazolam Infusion: 3.6 mL    morphine  Infusion: 18 mL  Total IN: 21.6 mL    OUT:    Voided: 40 mL  Total OUT: 40 mL    Total NET: -18.4 mL          PHYSICAL EXAM:    General/Neuro  RASS:  sedated RASS -1 to -2    Lungs: Intubated    Cardiovascular :   NSR@84 bpm    GI: Abdomen soft, Non-tender, Non-distended.        Orogastric tube in place    Extremities: Extremities warm, pink, well-perfused. Pulses:Rt     Lt    Derm: Good skin turgor, no skin breakdown.      :       Deutsch catheter in place.      CXR: Bilateral patchy infiltrates    LABS:  CAPILLARY BLOOD GLUCOSE      POCT Blood Glucose.: 161 mg/dL (30 Mar 2020 16:53)  POCT Blood Glucose.: 211 mg/dL (30 Mar 2020 11:07)  POCT Blood Glucose.: 211 mg/dL (30 Mar 2020 07:28)                          11.4   11.54 )-----------( 201      ( 30 Mar 2020 06:25 )             34.2       03-30    134<L>  |  97<L>  |  13  ----------------------------<  217<H>  3.6   |  16<L>  |  0.6<L>    Ca    8.3<L>      30 Mar 2020 06:25  Mg     2.1         TPro  6.0  /  Alb  3.3<L>  /  TBili  0.5  /  DBili  x   /  AST  20  /  ALT  21  /  AlkPhos  75

## 2020-03-31 NOTE — CONSULT NOTE ADULT - ASSESSMENT
A/P    75/yo female with multiple co-morbid conditions who is COVID 19 positive and now in acute respiratory failure  requiring intubation    1) neuro sedation w/ versed/ morphine     titrate to RASS of -1    2) Pulm      Acute respiratory failure      Intubated with a 7.5 tube      can wean fio2 to 80%      Daily CXR      Avoid fluid overload    2) Cards-      Hypotension      Arterial line inserted, /57      H/O HTN on lisinopril at home- discontinue      NO ACE or ARBS      3)  GI-       NPO       LR @110/hr       NG to LCWS    4) Renal      Preserved renal function      Gentle hydration with LR @ 100 ml/hr      BUN/cr 13/0.6      Hypokalemia at 0430 on 3/30, repeat BMP, IP, Mag     5) Heme-      H&H stable      Lovenox for DVT PPX- can switch to HSQ if creatine increases      SCD    6) ID-      COVID -19 + with respiratory failure      Procalciton levels, CRP, CBC with diff      Mild leukocytosis with leukopenia    7) Endocrine     IDDM on lantus at home     d/c lantus, Start ISS- if persistant hypergylcemia switch to ISS    8) MSK      Bedrest    Dispo   critical care A/P:    75/yo female with multiple co-morbid conditions who is COVID 19 positive and now in acute respiratory failure  requiring intubation    1) neuro sedation w/ versed/ morphine     titrate to RASS of -1    2) Pulm      Acute respiratory failure      Intubated with a 7.5 tube      can wean fio2 to 80%      Daily CXR      Avoid fluid overload    2) Cards-      Hypotension- responded to fluids      Arterial line inserted, /57      H/O HTN on lisinopril at home- discontinue     Avoid  ACE or ARBS      3)  GI-       NPO       LR @110/hr       NG to LCWS    4) Renal      Preserved renal function      Gentle hydration with LR @ 100 ml/hr      BUN/cr 13/0.6      Hypokalemia at 0430 on 3/30, repeat BMP, IP, Mag     5) Heme-      H&H stable      Lovenox for DVT PPX- can switch to HSQ if creatine increases      SCD    6) ID-      T- max 96.9      COVID -19 + with respiratory failure      Procalciton levels, CRP, CBC with diff      Mild leukocytosis with leukopenia      Azithromycin and Plaquenil    7) Endocrine     IDDM on lantus at home     d/c lantus, Start ISS- if persistant hypergylcemia switch to ISS     Obtain ISS     Trend temperature     H/O Hypothyroidism not on medications-  TSH 2.2    8) MSK      Bedrest    Dispo   critical care

## 2020-03-31 NOTE — PROGRESS NOTE ADULT - ASSESSMENT
ASSESSMENT & PLAN    A/P:  1.  Acute Hypoxic Respiratory Failure  intubated   2/2 Covid-19 positive    on HCQ protocol, Vit C 100bid, Zinc,     2. Syncope - 2/2 dehydration 2/2 covid 19, no evidence of cardiac etiology    3.  CAD - stable    3.  DM II - monitor

## 2020-04-01 NOTE — PROGRESS NOTE ADULT - ASSESSMENT
ASSESSMENT  74 yo female with medical hx of HTN, DM II, Hypothyroidism HLD BIBA for evaluation of syncope, Pt also endorses Cough generalized weakness and fever of 101F  for 3-4 days duration, fall at home, PEA with ROSC after son gave CPR    IMPRESSION  # COVID-19 PNA, intubated 3/30, shock requiring pressors    Procalcitonin, Serum: 1.25 (03-31-20 @ 17:51)<--Procalcitonin, Serum: 2.13 (03-31-20 @ 05:30)<--Procalcitonin, Serum: 0.48 ng/mL (03-29-20 @ 06:13) <--Procalcitonin, Serum: 0.48 ng/mL (03-28-20 @ 11:44)    CXR b/l interstitial opacities   #Cytokine Release Syndrome   D-Dimer Assay, Quantitative: 1278 ng/mL DDU (04-01-20 @ 06:10)  C-Reactive Protein, Serum: 41.26 mg/dL (03-31-20 @ 17:51)  C-Reactive Protein, Serum: 41.84 mg/dL (03-31-20 @ 05:30)  D-Dimer Assay, Quantitative: 1159 ng/mL DDU (03-31-20 @ 05:30)  #QTC Calculation(Bezet) 467 ms  #DM Hemoglobin A1C, Whole Blood: 9.9 (03-31-20 @ 05:30)      RECOMMENDATIONS  - Elevated procal, increasing opacities, can start Cefepime 2g q12h IV  - PLAQUENIL 200mg BID PO x 4 days   - Azithro 250mg PO daily x 4 days   - Monitor QTC with EKG daily  - Supportive care   - Trend CRP , procal   - off steroids    Spectra [phone] ASSESSMENT  74 yo female with medical hx of HTN, DM II, Hypothyroidism HLD BIBA for evaluation of syncope, Pt also endorses Cough generalized weakness and fever of 101F  for 3-4 days duration, fall at home, PEA with ROSC after son gave CPR    IMPRESSION  # COVID-19 PNA, intubated 3/30, unspecified shock required pressors- now off    Procalcitonin, Serum: 1.25 (03-31-20 @ 17:51)<--Procalcitonin, Serum: 2.13 (03-31-20 @ 05:30)<--Procalcitonin, Serum: 0.48 ng/mL (03-29-20 @ 06:13) <--Procalcitonin, Serum: 0.48 ng/mL (03-28-20 @ 11:44)    CXR b/l interstitial opacities   #Cytokine Release Syndrome   D-Dimer Assay, Quantitative: 1278 ng/mL DDU (04-01-20 @ 06:10)  C-Reactive Protein, Serum: 41.26 mg/dL (03-31-20 @ 17:51)  C-Reactive Protein, Serum: 41.84 mg/dL (03-31-20 @ 05:30)  D-Dimer Assay, Quantitative: 1159 ng/mL DDU (03-31-20 @ 05:30)  #QTC Calculation(Bezet) 467 ms  #DM Hemoglobin A1C, Whole Blood: 9.9 (03-31-20 @ 05:30)      RECOMMENDATIONS  - Elevated procal, increasing opacities, can start Cefepime 2g q12h IV  - PLAQUENIL 200mg BID PO x 4 days   - Azithro 250mg PO daily x 4 days   - Monitor QTC with EKG daily  - Supportive care   - Trend CRP , procal   - off steroids    Spectra [phone]

## 2020-04-01 NOTE — PROGRESS NOTE ADULT - ASSESSMENT
A/P:    75/yo female with multiple co-morbid conditions who is COVID 19 positive and now in acute respiratory failure  requiring intubation    1) neuro sedation w/ versed/ morphine     titrate to RASS of -1    2) Pulm      Acute respiratory failure      Intubated with a 7.5 tube      can wean fio2 to 80%      Daily CXR      Avoid fluid overload    2) Cards      Maintain normotension, off levo.       H/O HTN on lisinopril at home- discontinue     Avoid  ACE or ARBS      3)  GI-       NPO       LR @110/hr       NG to LCWS    4) Renal      Preserved renal function      Gentle hydration with LR @ 100 ml/hr      BUN/cr 13/0.6      Hypokalemia at 0430 on 3/30, repeat BMP, IP, Mag     5) Heme-      H&H stable      Lovenox for DVT PPX- can switch to HSQ if creatine increases      SCD    6) ID-      T- max 96.9      COVID -19 + with respiratory failure      Procalciton levels, CRP, CBC with diff      Mild leukocytosis with leukopenia      Azithromycin and Plaquenil    7) Endocrine     IDDM on lantus at home     d/c lantus, Start ISS- if persistant hypergylcemia switch to ISS     Obtain ISS     Trend temperature     H/O Hypothyroidism not on medications-  TSH 2.2    8) MSK      Bedrest    Dispo   critical care

## 2020-04-01 NOTE — DIETITIAN INITIAL EVALUATION ADULT. - REASON INDICATOR FOR ASSESSMENT
OGT feeding initiated - pt is intubated to ventilator, COVID19+, No edema. Skin intact. LBM unknown. on IV versed, LR, IVF. Ve 11.3, MAP 83, /55, Temp 37.6c. RN spoken with, currently on Glucerna 1.2 at 20cc/hr (NGT) and is tolerating for now.  [facility]'s Phone number, unable to find out height. No prior admission hx.

## 2020-04-01 NOTE — DIETITIAN INITIAL EVALUATION ADULT. - ENERGY NEEDS
1360 -1496 kcal/day (20-22 kcal/kg of ABW) - without height, pt likely with BMI<30, have considered using 11-14 kcal/kg but too low. therefore, this range is solely RD judgement.   g/day (1.2-1.5 g/kg of ABW)  Fluid per SICU team

## 2020-04-01 NOTE — PROGRESS NOTE ADULT - SUBJECTIVE AND OBJECTIVE BOX
1) neuro sedation w/ versed/ morphine     titrate to RASS of -2    2) Pulm      Acute respiratory failure      Intubated with a 7.5 tube      Wean vent as tolerated      Daily CXR          2) Cards-      CAD s/p cardiac stent- continue ASA,       Avoid  ACE or ARBS      3)  GI- TFG;Glucerna 20 ml/hr         LR @110/hr       PPI w Protonix and Senna for bowel regimen    4) Renal      ContinueLR @ 110 ml/hr      monitor K and Mg    5) Heme-      H&H stable      Hep SQ       SCD    6) ID-      COVID -19 + with respiratory failure      Procalcitonin, Serum: 0.48:       CRP- 29      Procalciton levels, CRP, CBC with diff      Mild leukocytosis with leukopenia      Azithromycin and Plaquenil    7) Endocrine     Continue insulin gtt      H/O Hypothyroidism not on medications-  TSH 2.2 1) neuro sedation w/ versed/ morphine     titrate to RASS of -2    2) Pulm      Acute respiratory failure      Intubated with a 7.5 tube      Wean vent as tolerated      Daily CXR          2) Cards-      CAD s/p cardiac stent- continue ASA,       Avoid  ACE or ARBS      3)  GI- TFG;Glucerna 20 ml/hr         LR @110/hr       PPI w Protonix and Senna for bowel regimen    4) Renal      ContinueLR @ 110 ml/hr      monitor K and Mg    5) Heme-      H&H stable      Hep SQ       SCD    6) ID-      COVID -19 + with respiratory failure      Procalcitonin, Serum: 0.48:       CRP- 29      Procalciton levels, CRP, CBC with diff      Mild leukocytosis with leukopenia      Azithromycin and Plaquenil    7) Endocrine     Continue insulin gtt      H/O Hypothyroidism not on medications-  TSH 2.2      Daily     Daily     Diet, NPO with Tube Feed:   Tube Feeding Modality: Nasogastric  Glucerna 1.2 Oscar  Total Volume for 24 Hours (mL): 480  Continuous  Starting Tube Feed Rate mL per Hour: 20  Until Goal Tube Feed Rate (mL per Hour): 20  Tube Feed Duration (in Hours): 24  Tube Feed Start Time: 11:30 (03-31-20 @ 11:07)      CURRENT MEDS:  Neurologic Medications  acetaminophen   Tablet .. 650 milliGRAM(s) Oral every 6 hours PRN Temp greater or equal to 38C (100.4F)  acetaminophen   Tablet .. 650 milliGRAM(s) Oral every 6 hours PRN Temp greater or equal to 38C (100.4F)  midazolam Infusion 0.02 mG/kG/Hr IV Continuous <Continuous>  morphine  Infusion 2 mG/Hr IV Continuous <Continuous>    Respiratory Medications  guaifenesin/dextromethorphan  Syrup 10 milliLiter(s) Oral every 8 hours    Cardiovascular Medications  labetalol Injectable 10 milliGRAM(s) IV Push once    Gastrointestinal Medications  ascorbic acid 1000 milliGRAM(s) Oral two times a day  lactated ringers Bolus 500 milliLiter(s) IV Bolus once  lactated ringers. 1000 milliLiter(s) IV Continuous <Continuous>  pantoprazole  Injectable 40 milliGRAM(s) IV Push daily  senna 2 Tablet(s) Oral at bedtime  zinc sulfate 220 milliGRAM(s) Oral daily    Genitourinary Medications    Hematologic/Oncologic Medications  clopidogrel Tablet 75 milliGRAM(s) Oral daily  heparin  Injectable 5000 Unit(s) SubCutaneous every 8 hours    Antimicrobial/Immunologic Medications  azithromycin   Tablet 250 milliGRAM(s) Oral daily  hydroxychloroquine   Oral   hydroxychloroquine 200 milliGRAM(s) Oral every 12 hours    Endocrine/Metabolic Medications  atorvastatin 40 milliGRAM(s) Oral at bedtime  insulin regular Infusion 1 Unit(s)/Hr IV Continuous <Continuous>    Topical/Other Medications  chlorhexidine 0.12% Liquid 15 milliLiter(s) Oral Mucosa two times a day      ICU Vital Signs Last 24 Hrs  T(F): 99, Max: 101.5   HR: 111  BP: 155/59   BP(mean): 92   ABP: 135/62  ABP(mean): 89   RR: 30   SpO2: 95%       Mode: AC/ CMV (Assist Control/ Continuous Mandatory Ventilation)  RR (machine): 20  TV (machine): 400  FiO2: 100  PEEP: 10.3  ITime: 1  MAP: 10  PIP: 20    ABG - ( 31 Mar 2020 14:27 )  pH, Arterial: 7.34  pH, Blood: x     /  pCO2: 47    /  pO2: 65    / HCO3: 26    / Base Excess: -0.5  /  SaO2: 93            I&O's Summary    31 Mar 2020 07:01  -  01 Apr 2020 07:00  --------------------------------------------------------  IN: 3408.7 mL / OUT: 355 mL / NET: 3053.7 mL      I&O's Detail    31 Mar 2020 07:01  -  01 Apr 2020 07:00  --------------------------------------------------------  IN:    insulin regular Infusion: 10 mL    IV PiggyBack: 750 mL    Lactated Ringers IV Bolus: 500 mL    lactated ringers.: 990 mL    lactated ringers.: 990 mL    midazolam Infusion: 21.6 mL    morphine  Infusion: 27 mL    norepinephrine Infusion: 0.1 mL    Oral Fluid: 120 mL  Total IN: 3408.7 mL    OUT:    Indwelling Catheter - Urethral: 355 mL  Total OUT: 355 mL    Total NET: 3053.7 mL        LABS:  CAPILLARY BLOOD GLUCOSE      POCT Blood Glucose.: 171 mg/dL (01 Apr 2020 07:24)  POCT Blood Glucose.: 204 mg/dL (01 Apr 2020 05:37)  POCT Blood Glucose.: 210 mg/dL (01 Apr 2020 04:23)  POCT Blood Glucose.: 205 mg/dL (01 Apr 2020 03:33)  POCT Blood Glucose.: 212 mg/dL (01 Apr 2020 02:00)  POCT Blood Glucose.: 198 mg/dL (01 Apr 2020 00:17)  POCT Blood Glucose.: 207 mg/dL (31 Mar 2020 21:30)  POCT Blood Glucose.: 118 mg/dL (31 Mar 2020 18:50)  POCT Blood Glucose.: 138 mg/dL (31 Mar 2020 16:18)  POCT Blood Glucose.: 154 mg/dL (31 Mar 2020 13:35)  POCT Blood Glucose.: 185 mg/dL (31 Mar 2020 12:15)  POCT Blood Glucose.: 189 mg/dL (31 Mar 2020 09:23)                          10.4   12.70 )-----------( 237      ( 01 Apr 2020 06:10 )             31.4       04-01    140  |  102  |  22<H>  ----------------------------<  214<H>  3.6   |  20  |  0.9    Ca    8.5      01 Apr 2020 06:10  Phos  3.6     04-01  Mg     1.8     04-01    TPro  5.6<L>  /  Alb  2.9<L>  /  TBili  0.3  /  DBili  x   /  AST  21  /  ALT  21  /  AlkPhos  76  03-31      PT/INR - ( 31 Mar 2020 17:51 )   PT: 14.30 sec;   INR: 1.24 ratio         PTT - ( 01 Apr 2020 06:10 )  PTT:29.3 sec  CARDIAC MARKERS ( 31 Mar 2020 11:30 )  x     / 0.03 ng/mL / 40 U/L / x     / 2.1 ng/mL  CARDIAC MARKERS ( 31 Mar 2020 05:30 )  x     / 0.04 ng/mL / 44 U/L / x     / x

## 2020-04-01 NOTE — DIETITIAN INITIAL EVALUATION ADULT. - PERTINENT MEDS FT
heparin, abx, Plaquenil, LR, labetalol, acetaminophen, protonix, versed, morphine, senna, vitamin C, zinc, acetaminophen, atorvastatin

## 2020-04-01 NOTE — PROGRESS NOTE ADULT - SUBJECTIVE AND OBJECTIVE BOX
TRICIA SANTIZO  75y, Female  Allergy: No Known Allergies      LOS  4d    CHIEF COMPLAINT: Syncope, Pneumonia  R/O COVID (01 Apr 2020 03:11)      INTERVAL EVENTS/HPI  - T(F): , Max: 101.5 (03-31-20 @ 19:00) CXR increased opacification R  - WBC Count: 12.70 (04-01-20 @ 06:10)  WBC Count: 17.64 (03-31-20 @ 17:51)  - Creatinine, Serum: 1.0 (03-31-20 @ 17:51)  Creatinine, Serum: 1.3 (03-31-20 @ 05:30)       ROS  unable to obtain history secondary to patient's mental status and/or sedation     VITALS:  T(F): 99, Max: 101.5 (03-31-20 @ 19:00)  HR: 111  BP: 155/59  RR: 30Vital Signs Last 24 Hrs  T(C): 37.2 (01 Apr 2020 04:14), Max: 38.6 (31 Mar 2020 19:00)  T(F): 99 (01 Apr 2020 04:14), Max: 101.5 (31 Mar 2020 19:00)  HR: 111 (01 Apr 2020 04:14) (83 - 122)  BP: 155/59 (01 Apr 2020 04:14) (96/52 - 161/57)  BP(mean): 92 (01 Apr 2020 04:14) (71 - 99)  RR: 30 (01 Apr 2020 04:14) (19 - 35)  SpO2: 95% (01 Apr 2020 04:14) (92% - 100%)    PHYSICAL EXAM:  Gen: Intubated  HEENT: NCAT  CV: RRR  Lungs: b/l chest expansion   Neuro: Sedated  Lines: no phlebitis     FH: Non-contributory  Social Hx: Non-contributory    TESTS & MEASUREMENTS:                        10.4   12.70 )-----------( 237      ( 01 Apr 2020 06:10 )             31.4     03-31    137  |  102  |  23<H>  ----------------------------<  119<H>  4.0   |  21  |  1.0    Ca    8.4<L>      31 Mar 2020 17:51  Phos  3.2     03-31  Mg     1.9     03-31    TPro  5.6<L>  /  Alb  2.9<L>  /  TBili  0.3  /  DBili  x   /  AST  21  /  ALT  21  /  AlkPhos  76  03-31    eGFR if Non African American: 56 mL/min/1.73M2 (03-31-20 @ 17:51)  eGFR if : 65 mL/min/1.73M2 (03-31-20 @ 17:51)    LIVER FUNCTIONS - ( 31 Mar 2020 05:30 )  Alb: 2.9 g/dL / Pro: 5.6 g/dL / ALK PHOS: 76 U/L / ALT: 21 U/L / AST: 21 U/L / GGT: x               Culture - Blood (collected 03-27-20 @ 23:00)  Source: .Blood Blood-Peripheral  Final Report (04-01-20 @ 07:00):    No Growth Final    Culture - Blood (collected 03-27-20 @ 23:00)  Source: .Blood Blood-Peripheral  Final Report (04-01-20 @ 07:00):    No Growth Final        Blood Gas Venous - Lactate: 1.6 mmoL/L (03-27-20 @ 23:21)      INFECTIOUS DISEASES TESTING  Procalcitonin, Serum: 1.25 (03-31-20 @ 17:51)  Procalcitonin, Serum: 2.13 (03-31-20 @ 05:30)  Procalcitonin, Serum: 0.48 (03-29-20 @ 06:13)  Procalcitonin, Serum: 0.48 (03-28-20 @ 11:44)  COVID-19 PCR: Detected (03-27-20 @ 23:00)  Flu B Result: Negative (03-27-20 @ 23:00)  RSV Result: Negative (03-27-20 @ 23:00)  Flu A Result: Negative (03-27-20 @ 23:00)      INFLAMMATORY MARKERS  Sedimentation Rate, Erythrocyte: 107 mm/Hr (04-01-20 @ 00:00)  C-Reactive Protein, Serum: 41.26 mg/dL (03-31-20 @ 17:51)  C-Reactive Protein, Serum: 41.84 mg/dL (03-31-20 @ 05:30)  C-Reactive Protein, Serum: 29.06 mg/dL (03-29-20 @ 06:13)      RADIOLOGY & ADDITIONAL TESTS:  I have personally reviewed the last available Chest xray  CXR      CT      CARDIOLOGY TESTING  12 Lead ECG:   Systolic  mmHg    Diastolic BP 42 mmHg    Ventricular Rate 92 BPM    Atrial Rate 92 BPM    P-R Interval 196 ms    QRS Duration 80 ms    Q-T Interval 378 ms    QTC Calculation(Bezet) 467 ms    P Axis 80 degrees    R Axis 75 degrees    T Axis 81 degrees    Diagnosis Line Normal sinus rhythm  Septal infarct , age undetermined  Abnormal ECG    Confirmed by RAMYA SILVA MD [id number]) on 3/31/2020 10:47:45 AM (03-31-20 @ 10:19)  12 Lead ECG:   Ventricular Rate 101 BPM    Atrial Rate 101 BPM    P-R Interval 188 ms    QRS Duration 76 ms    Q-T Interval 352 ms    QTC Calculation(Bezet) 456 ms    P Axis 46 degrees    R Axis 32 degrees    T Axis 44 degrees    Diagnosis Line Sinus tachycardia  Low voltage QRS  Borderline ECG    Confirmed by RAMYA SILVA MD [id number]) on 3/30/2020 2:23:20 PM (03-29-20 @ 17:10)      MEDICATIONS  ascorbic acid 1000  atorvastatin 40  azithromycin   Tablet 250  chlorhexidine 0.12% Liquid 15  clopidogrel Tablet 75  guaifenesin/dextromethorphan  Syrup 10  heparin  Injectable 5000  hydroxychloroquine   hydroxychloroquine 200  insulin regular Infusion 1  labetalol Injectable 10  lactated ringers Bolus 500  lactated ringers. 1000  midazolam Infusion 0.02  morphine  Infusion 2  pantoprazole  Injectable 40  senna 2  zinc sulfate 220      WEIGHT  Weight (kg): 68 (03-27-20 @ 20:32)  Creatinine, Serum: 1.0 mg/dL (03-31-20 @ 17:51)      ANTIBIOTICS:  azithromycin   Tablet 250 milliGRAM(s) Oral daily  hydroxychloroquine   Oral   hydroxychloroquine 200 milliGRAM(s) Oral every 12 hours      All available historical records have been reviewed

## 2020-04-01 NOTE — PROGRESS NOTE ADULT - ASSESSMENT
Acute hypoxic respiratory failure s/p intubation  ARDS  Suspected ACute PE  Viral Pneumonia  Htn  Syncopal episode  ALIYAH      cont sedation  Ards net protocol  cont hydrochloroquine x 5 days total (while in hospital)  would resume heparin gtt  monitor coags  cont azithromycin  agree with cefipime  monitor qtc  enteric feeds  follow up d dimer and ferrtitin  cont vitamin c  obtain ua urine sodium and urine cr  dec lr to 70 cc per hour  dvt/g i px  cc time spent 35 min  prognosis guarded  cont cc monitoring  As my order have been discontinued and patient transferred to surgical attendings service will sign off  If primary care physoician of patient would like pulmonary follow up and mgmt please recall  d/w surgical PA

## 2020-04-01 NOTE — PROGRESS NOTE ADULT - SUBJECTIVE AND OBJECTIVE BOX
DARLYNTRICIA JUAN  75y, Female  Allergy: No Known Allergies      LAST 24-Hr EVENTS:    VITALS:  T(F): 100.5 (04-01-20 @ 08:00), Max: 101.5 (03-31-20 @ 19:00)  HR: 105 (04-01-20 @ 09:00)  BP: 119/58 (04-01-20 @ 09:00) (103/53 - 161/57)  RR: 27 (04-01-20 @ 09:00)  SpO2: 99% (04-01-20 @ 09:00)    PHYSICAL EXAM:    GENERAL: NAD  NECK: Supple, No JVD  CHEST/LUNG: CTA b/l  HEART: Regular rate and rhythm  ABDOMEN: Soft, Nontender, Nondistended  EXTREMITIES:  No clubbing, edema absent        TESTS & MEASUREMENTS:    IN: 861 mL / OUT: 190 mL / NET: 671 mL    IN: 3541 mL / OUT: 805 mL / NET: 2736 mL    IN: 233.8 mL / OUT: 130 mL / NET: 103.8 mL                            10.4   12.70 )-----------( 237      ( 01 Apr 2020 06:10 )             31.4     PT/INR - ( 31 Mar 2020 17:51 )   PT: 14.30 sec;   INR: 1.24 ratio         PTT - ( 01 Apr 2020 06:10 )  PTT:29.3 sec  04-01    140  |  102  |  22<H>  ----------------------------<  214<H>  3.6   |  20  |  0.9    Ca    8.5      01 Apr 2020 06:10  Phos  3.6     04-01  Mg     1.8     04-01    TPro  5.6<L>  /  Alb  2.9<L>  /  TBili  0.3  /  DBili  x   /  AST  21  /  ALT  21  /  AlkPhos  76  03-31    LIVER FUNCTIONS - ( 31 Mar 2020 05:30 )  Alb: 2.9 g/dL / Pro: 5.6 g/dL / ALK PHOS: 76 U/L / ALT: 21 U/L / AST: 21 U/L / GGT: x           CARDIAC MARKERS ( 31 Mar 2020 11:30 )  x     / 0.03 ng/mL / 40 U/L / x     / 2.1 ng/mL  CARDIAC MARKERS ( 31 Mar 2020 05:30 )  x     / 0.04 ng/mL / 44 U/L / x     / x              D-Dimer Assay, Quantitative: 1278 ng/mL DDU (04-01-20 @ 06:10)  Procalcitonin, Serum: 1.25 ng/mL (03-31-20 @ 17:51)  Procalcitonin, Serum: 2.13 ng/mL (03-31-20 @ 05:30)  D-Dimer Assay, Quantitative: 1159 ng/mL DDU (03-31-20 @ 05:30)  dimer    Culture - Blood (collected 03-27-20 @ 23:00)  Source: .Blood Blood-Peripheral  Final Report (04-01-20 @ 07:00):    No Growth Final    Culture - Blood (collected 03-27-20 @ 23:00)  Source: .Blood Blood-Peripheral  Final Report (04-01-20 @ 07:00):    No Growth Final          ABG & VENT SETTINGS: (when applicable)  ABG - ( 31 Mar 2020 14:27 )  pH, Arterial: 7.34  pH, Blood: x     /  pCO2: 47    /  pO2: 65    / HCO3: 26    / Base Excess: -0.5  /  SaO2: 93                Mode: AC/ CMV (Assist Control/ Continuous Mandatory Ventilation)  RR (machine): 20  TV (machine): 400  FiO2: 100  PEEP: 10.3  ITime: 1  MAP: 10  PIP: 20      RADIOLOGY & ADDITIONAL TESTS:      MEDICATIONS:  MEDICATIONS  (STANDING):  ascorbic acid 1000 milliGRAM(s) Oral two times a day  atorvastatin 40 milliGRAM(s) Oral at bedtime  azithromycin   Tablet 250 milliGRAM(s) Oral daily  chlorhexidine 0.12% Liquid 15 milliLiter(s) Oral Mucosa two times a day  clopidogrel Tablet 75 milliGRAM(s) Oral daily  guaifenesin/dextromethorphan  Syrup 10 milliLiter(s) Oral every 8 hours  heparin  Injectable 5000 Unit(s) SubCutaneous every 8 hours  hydroxychloroquine   Oral   hydroxychloroquine 200 milliGRAM(s) Oral every 12 hours  insulin regular Infusion 1 Unit(s)/Hr (1 mL/Hr) IV Continuous <Continuous>  labetalol Injectable 10 milliGRAM(s) IV Push once  lactated ringers Bolus 500 milliLiter(s) IV Bolus once  lactated ringers. 1000 milliLiter(s) (110 mL/Hr) IV Continuous <Continuous>  midazolam Infusion 0.02 mG/kG/Hr (1.36 mL/Hr) IV Continuous <Continuous>  morphine  Infusion 2 mG/Hr (2 mL/Hr) IV Continuous <Continuous>  pantoprazole  Injectable 40 milliGRAM(s) IV Push daily  senna 2 Tablet(s) Oral at bedtime  zinc sulfate 220 milliGRAM(s) Oral daily    MEDICATIONS  (PRN):  acetaminophen   Tablet .. 650 milliGRAM(s) Oral every 6 hours PRN Temp greater or equal to 38C (100.4F)  acetaminophen   Tablet .. 650 milliGRAM(s) Oral every 6 hours PRN Temp greater or equal to 38C (100.4F) DARLYNTRICIA JUAN  75y, Female  Allergy: No Known Allergies      LAST 24-Hr EVENTS:  remains intubated  all pulmoanry orders cancelled by surgical attending  remains febrile    VITALS:  T(F): 100.5 (04-01-20 @ 08:00), Max: 101.5 (03-31-20 @ 19:00)  HR: 105 (04-01-20 @ 09:00)  BP: 119/58 (04-01-20 @ 09:00) (103/53 - 161/57)  RR: 27 (04-01-20 @ 09:00)  SpO2: 99% (04-01-20 @ 09:00)    PHYSICAL EXAM:    GENERAL: NAD  NECK: Supple, No JVD  CHEST/LUNG: CTA b/l  HEART: Regular rate and rhythm  ABDOMEN: Soft, Nontender, Nondistended  EXTREMITIES:  No clubbing, edema absent        TESTS & MEASUREMENTS:    IN: 861 mL / OUT: 190 mL / NET: 671 mL    IN: 3541 mL / OUT: 805 mL / NET: 2736 mL    IN: 233.8 mL / OUT: 130 mL / NET: 103.8 mL                            10.4   12.70 )-----------( 237      ( 01 Apr 2020 06:10 )             31.4     PT/INR - ( 31 Mar 2020 17:51 )   PT: 14.30 sec;   INR: 1.24 ratio         PTT - ( 01 Apr 2020 06:10 )  PTT:29.3 sec  04-01    140  |  102  |  22<H>  ----------------------------<  214<H>  3.6   |  20  |  0.9    Ca    8.5      01 Apr 2020 06:10  Phos  3.6     04-01  Mg     1.8     04-01    TPro  5.6<L>  /  Alb  2.9<L>  /  TBili  0.3  /  DBili  x   /  AST  21  /  ALT  21  /  AlkPhos  76  03-31    LIVER FUNCTIONS - ( 31 Mar 2020 05:30 )  Alb: 2.9 g/dL / Pro: 5.6 g/dL / ALK PHOS: 76 U/L / ALT: 21 U/L / AST: 21 U/L / GGT: x           CARDIAC MARKERS ( 31 Mar 2020 11:30 )  x     / 0.03 ng/mL / 40 U/L / x     / 2.1 ng/mL  CARDIAC MARKERS ( 31 Mar 2020 05:30 )  x     / 0.04 ng/mL / 44 U/L / x     / x              D-Dimer Assay, Quantitative: 1278 ng/mL DDU (04-01-20 @ 06:10)  Procalcitonin, Serum: 1.25 ng/mL (03-31-20 @ 17:51)  Procalcitonin, Serum: 2.13 ng/mL (03-31-20 @ 05:30)  D-Dimer Assay, Quantitative: 1159 ng/mL DDU (03-31-20 @ 05:30)  dimer    Culture - Blood (collected 03-27-20 @ 23:00)  Source: .Blood Blood-Peripheral  Final Report (04-01-20 @ 07:00):    No Growth Final    Culture - Blood (collected 03-27-20 @ 23:00)  Source: .Blood Blood-Peripheral  Final Report (04-01-20 @ 07:00):    No Growth Final          ABG & VENT SETTINGS: (when applicable)  ABG - ( 31 Mar 2020 14:27 )  pH, Arterial: 7.34  pH, Blood: x     /  pCO2: 47    /  pO2: 65    / HCO3: 26    / Base Excess: -0.5  /  SaO2: 93                Mode: AC/ CMV (Assist Control/ Continuous Mandatory Ventilation)  RR (machine): 20  TV (machine): 400  FiO2: 100  PEEP: 10.3  ITime: 1  MAP: 10  PIP: 20      RADIOLOGY & ADDITIONAL TESTS:      MEDICATIONS:  MEDICATIONS  (STANDING):  ascorbic acid 1000 milliGRAM(s) Oral two times a day  atorvastatin 40 milliGRAM(s) Oral at bedtime  azithromycin   Tablet 250 milliGRAM(s) Oral daily  chlorhexidine 0.12% Liquid 15 milliLiter(s) Oral Mucosa two times a day  clopidogrel Tablet 75 milliGRAM(s) Oral daily  guaifenesin/dextromethorphan  Syrup 10 milliLiter(s) Oral every 8 hours  heparin  Injectable 5000 Unit(s) SubCutaneous every 8 hours  hydroxychloroquine   Oral   hydroxychloroquine 200 milliGRAM(s) Oral every 12 hours  insulin regular Infusion 1 Unit(s)/Hr (1 mL/Hr) IV Continuous <Continuous>  labetalol Injectable 10 milliGRAM(s) IV Push once  lactated ringers Bolus 500 milliLiter(s) IV Bolus once  lactated ringers. 1000 milliLiter(s) (110 mL/Hr) IV Continuous <Continuous>  midazolam Infusion 0.02 mG/kG/Hr (1.36 mL/Hr) IV Continuous <Continuous>  morphine  Infusion 2 mG/Hr (2 mL/Hr) IV Continuous <Continuous>  pantoprazole  Injectable 40 milliGRAM(s) IV Push daily  senna 2 Tablet(s) Oral at bedtime  zinc sulfate 220 milliGRAM(s) Oral daily    MEDICATIONS  (PRN):  acetaminophen   Tablet .. 650 milliGRAM(s) Oral every 6 hours PRN Temp greater or equal to 38C (100.4F)  acetaminophen   Tablet .. 650 milliGRAM(s) Oral every 6 hours PRN Temp greater or equal to 38C (100.4F)

## 2020-04-01 NOTE — DIETITIAN INITIAL EVALUATION ADULT. - ADD RECOMMEND
Since patient has been started on Glucerna 1.2, and no reported intolerance by RN at this time, running at 20cc/hr, RD recommend to have a GOAL of Glucerna 1.2 at 45cc/hr with No-carb Prosource TF packet x3/day. This regimen at GOAL gives a total of 1402 kcal/ 92g protein/ 874mL free water, additional flushes per SICU team. RD will continue to monitor enteral tolerance, glucose and renal profile and adjust feeding as tolerated.

## 2020-04-01 NOTE — DIETITIAN INITIAL EVALUATION ADULT. - OTHER INFO
p/w syncope, PNA r/o COVID with fever of 101 for 3-4 days.  now sedated with versed/ morphine. Pulm f/u. Daily CXR. Avoid volume overload. h/h stable. monitoring renal function. Fever curve. FS.

## 2020-04-02 NOTE — PROGRESS NOTE ADULT - ASSESSMENT
· Assessment		  Acute hypoxic respiratory failure s/p intubation  ARDS  Suspected ACute PE  Viral Pneumonia  Htn  Syncopal episode  ALIYAH      cont sedation  Ards net protocol  cont hydrochloroquine x 5 days total (while in hospital)  would resume heparin gtt  monitor coags  cont azithromycin  agree with cefipime  monitor qtc  enteric feeds  follow up d dimer and ferrtitin  cont vitamin c  obtain ua urine sodium and urine cr  dec lr to 70 cc per hour  dvt/g i px  cc time spent 35 min  prognosis guarded  cont cc monitoring  As my order have been discontinued and patient transferred to surgical attendings service will sign off  If primary care physoician of patient would like pulmonary follow up and mgmt please recall  d/w surgical PA

## 2020-04-02 NOTE — PROGRESS NOTE ADULT - ASSESSMENT
ASSESSMENT  76 yo female with medical hx of HTN, DM II, Hypothyroidism HLD BIBA for evaluation of syncope, Pt also endorses Cough generalized weakness and fever of 101F  for 3-4 days duration, fall at home, PEA with ROSC after son gave CPR    IMPRESSION  # COVID-19 PNA, intubated 3/30, septic shock required pressors    Procalcitonin, Serum: 1.25 (03-31-20 @ 17:51)<--Procalcitonin, Serum: 2.13 (03-31-20 @ 05:30)<--Procalcitonin, Serum: 0.48 ng/mL (03-29-20 @ 06:13) <--Procalcitonin, Serum: 0.48 ng/mL (03-28-20 @ 11:44)    CXR b/l interstitial opacities   #Cytokine Release Syndrome   D-Dimer Assay, Quantitative: 1095 ng/mL DDU (04-01-20 @ 23:30)  Ferritin, Serum: 1031 ng/mL (04-01-20 @ 06:10)  D-Dimer Assay, Quantitative: 1278 ng/mL DDU (04-01-20 @ 06:10)  C-Reactive Protein, Serum: 41.26 mg/dL (03-31-20 @ 17:51)  #QTC Calculation(Bezet) 467 ms  #DM Hemoglobin A1C, Whole Blood: 9.9 (03-31-20 @ 05:30)    RECOMMENDATIONS  - Cefepime 2g q12h IV  - PLAQUENIL 200mg BID PO x 4 days   - Azithro 250mg PO daily x 4 days   - Monitor QTC with EKG daily  - Supportive care   - Trend CRP , procal     Spectra [id number]

## 2020-04-02 NOTE — PROGRESS NOTE ADULT - ASSESSMENT
A/P:    75/yo female with multiple co-morbid conditions who is COVID 19 positive and now in acute respiratory failure  requiring intubation    1) neuro sedation w/ versed/ morphine     titrate to RASS of -1    2) Pulm      Acute respiratory failure      Intubated with a 7.5 tube      can wean fio2 to 80%      Daily CXR      Avoid fluid overload    2) Cards      Maintain normotension, off levo.       H/O HTN on lisinopril at home- discontinue     Avoid  ACE or ARBS      3)  GI-       NPO       LR @110/hr       NG to LCWS    4) Renal      Preserved renal function      Gentle hydration with LR @ 100 ml/hr      BUN/cr 13/0.6      Hypokalemia at 0430 on 3/30, repeat BMP, IP, Mag     5) Heme-      H&H stable      Lovenox for DVT PPX- can switch to HSQ if creatine increases      SCD    6) ID-      T- max 96.9      COVID -19 + with respiratory failure      Procalciton levels, CRP, CBC with diff      Mild leukocytosis with leukopenia      Azithromycin and Plaquenil    7) Endocrine     IDDM on lantus at home     d/c lantus, Start ISS- if persistant hypergylcemia switch to ISS     Obtain ISS     Trend temperature     H/O Hypothyroidism not on medications-  TSH 2.2    8) MSK      Bedrest    Dispo   critical care A/P:    75/yo female with multiple co-morbid conditions who is COVID 19 positive and now in acute respiratory failure  requiring intubation    1) neuro sedation w/ versed/ morphine     titrate to RASS of -1    2) Pulm      Acute respiratory failure      Intubated with a 7.5 tube.      can wean fio2 to 80%      Daily CXR      Avoid fluid overload    2) Cards      Maintain normotension, off levo.       H/O HTN on lisinopril at home- discontinue     Avoid  ACE or ARBS      3)  GI-       NPO       LR @110/hr       NG to LCWS    4) Renal      Preserved renal function      Gentle hydration with LR @ 100 ml/hr      BUN/cr 13/0.6      Hypokalemia at 0430 on 3/30, repeat BMP, IP, Mag     5) Heme-      H&H stable      Lovenox for DVT PPX- can switch to HSQ if creatine increases      SCD    6) ID-      T- max 96.9      COVID -19 + with respiratory failure      Procalciton levels, CRP, CBC with diff      Mild leukocytosis with leukopenia      Azithromycin and Plaquenil    7) Endocrine     IDDM on lantus at home     d/c lantus, Start ISS- if persistant hypergylcemia switch to ISS     Obtain ISS     Trend temperature     H/O Hypothyroidism not on medications-  TSH 2.2    8) MSK      Bedrest    Dispo   critical care

## 2020-04-02 NOTE — PROGRESS NOTE ADULT - SUBJECTIVE AND OBJECTIVE BOX
TRICIA SANTIZO  75y, Female  Allergy: No Known Allergies      LAST 24-Hr EVENTS:    VITALS:  T(F): 96.6 (04-02-20 @ 09:00), Max: 99.9 (04-01-20 @ 22:00)  HR: 73 (04-02-20 @ 11:00)  BP: 133/64 (04-02-20 @ 09:00) (105/55 - 166/70)  RR: 22 (04-02-20 @ 11:00)  SpO2: 100% (04-02-20 @ 11:00)    PHYSICAL EXAM:    GENERAL: NAD  NECK: Supple, No JVD  CHEST/LUNG: CTA b/l  HEART: Regular rate and rhythm  ABDOMEN: Soft, Nontender, Nondistended  EXTREMITIES:  No clubbing, edema absent        TESTS & MEASUREMENTS:    IN: 3541 mL / OUT: 805 mL / NET: 2736 mL    IN: 4192.8 mL / OUT: 645 mL / NET: 3547.8 mL    IN: 562.5 mL / OUT: 158 mL / NET: 404.5 mL                            9.1    10.64 )-----------( 229      ( 01 Apr 2020 23:30 )             27.7     PT/INR - ( 01 Apr 2020 23:30 )   PT: 14.20 sec;   INR: 1.23 ratio         PTT - ( 01 Apr 2020 23:30 )  PTT:41.4 sec  04-01    139  |  104  |  24<H>  ----------------------------<  274<H>  4.8   |  23  |  1.0    Ca    8.3<L>      01 Apr 2020 23:30  Phos  3.7     04-01  Mg     2.2     04-01    TPro  4.6<L>  /  Alb  2.2<L>  /  TBili  0.5  /  DBili  x   /  AST  18  /  ALT  19  /  AlkPhos  123<H>  04-01    LIVER FUNCTIONS - ( 01 Apr 2020 23:30 )  Alb: 2.2 g/dL / Pro: 4.6 g/dL / ALK PHOS: 123 U/L / ALT: 19 U/L / AST: 18 U/L / GGT: x           CARDIAC MARKERS ( 01 Apr 2020 23:30 )  x     / 0.08 ng/mL / 29 U/L / x     / x              D-Dimer Assay, Quantitative: 1095 ng/mL DDU (04-01-20 @ 23:30)  Ferritin, Serum: 1031 ng/mL (04-01-20 @ 06:10)  D-Dimer Assay, Quantitative: 1278 ng/mL DDU (04-01-20 @ 06:10)  Procalcitonin, Serum: 1.25 ng/mL (03-31-20 @ 17:51)  Procalcitonin, Serum: 2.13 ng/mL (03-31-20 @ 05:30)  D-Dimer Assay, Quantitative: 1159 ng/mL DDU (03-31-20 @ 05:30)  dimer    Culture - Blood (collected 03-27-20 @ 23:00)  Source: .Blood Blood-Peripheral  Final Report (04-01-20 @ 07:00):    No Growth Final    Culture - Blood (collected 03-27-20 @ 23:00)  Source: .Blood Blood-Peripheral  Final Report (04-01-20 @ 07:00):    No Growth Final          ABG & VENT SETTINGS: (when applicable)  ABG - ( 02 Apr 2020 03:01 )  pH, Arterial: 7.29  pH, Blood: x     /  pCO2: 49    /  pO2: 259   / HCO3: 24    / Base Excess: -3.2  /  SaO2: 100               Mode: AC/ CMV (Assist Control/ Continuous Mandatory Ventilation)  RR (machine): 25  TV (machine): 400  FiO2: 80  PEEP: 15  ITime: 1  MAP: 21  PIP: 37      RADIOLOGY & ADDITIONAL TESTS:      MEDICATIONS:  MEDICATIONS  (STANDING):  ascorbic acid 1000 milliGRAM(s) Oral two times a day  atorvastatin 40 milliGRAM(s) Oral at bedtime  azithromycin   Tablet 250 milliGRAM(s) Oral daily  cefepime   IVPB 2000 milliGRAM(s) IV Intermittent every 12 hours  chlorhexidine 0.12% Liquid 15 milliLiter(s) Oral Mucosa two times a day  clopidogrel Tablet 75 milliGRAM(s) Oral daily  guaifenesin/dextromethorphan  Syrup 10 milliLiter(s) Oral every 8 hours  heparin  Injectable 5000 Unit(s) SubCutaneous every 8 hours  hydroxychloroquine   Oral   hydroxychloroquine 200 milliGRAM(s) Oral every 12 hours  insulin regular Infusion 1 Unit(s)/Hr (1 mL/Hr) IV Continuous <Continuous>  lactated ringers. 1000 milliLiter(s) (110 mL/Hr) IV Continuous <Continuous>  methylPREDNISolone sodium succinate Injectable 60 milliGRAM(s) IV Push every 12 hours  midazolam Infusion 0.02 mG/kG/Hr (1.36 mL/Hr) IV Continuous <Continuous>  morphine  Infusion 2 mG/Hr (2 mL/Hr) IV Continuous <Continuous>  norepinephrine Infusion 0.05 MICROgram(s)/kG/Min (3.19 mL/Hr) IV Continuous <Continuous>  pantoprazole  Injectable 40 milliGRAM(s) IV Push daily  senna 2 Tablet(s) Oral at bedtime  zinc sulfate 220 milliGRAM(s) Oral daily    MEDICATIONS  (PRN):  acetaminophen   Tablet .. 650 milliGRAM(s) Oral every 6 hours PRN Temp greater or equal to 38C (100.4F)  acetaminophen   Tablet .. 650 milliGRAM(s) Oral every 6 hours PRN Temp greater or equal to 38C (100.4F)

## 2020-04-02 NOTE — PROGRESS NOTE ADULT - SUBJECTIVE AND OBJECTIVE BOX
TRICIA SANTIZO  75y, Female  Allergy: No Known Allergies      LOS  5d    CHIEF COMPLAINT: Syncope, Pneumonia  R/O COVID (02 Apr 2020 01:46)      INTERVAL EVENTS/HPI  - T(F): , Max: 101.1 (04-01-20 @ 12:01), pending CXR  - WBC Count: 10.64 (04-01-20 @ 23:30)  WBC Count: 12.70 (04-01-20 @ 06:10)  - Creatinine, Serum: 1.0 (04-01-20 @ 23:30)  Creatinine, Serum: 0.9 (04-01-20 @ 15:51)       ROS  unable to obtain history secondary to patient's mental status and/or sedation     VITALS:  T(F): 97, Max: 101.1 (04-01-20 @ 12:01)  HR: 75  BP: 111/59  RR: 25Vital Signs Last 24 Hrs  T(C): 36.1 (02 Apr 2020 06:00), Max: 38.4 (01 Apr 2020 12:01)  T(F): 97 (02 Apr 2020 06:00), Max: 101.1 (01 Apr 2020 12:01)  HR: 75 (02 Apr 2020 07:00) (73 - 123)  BP: 111/59 (02 Apr 2020 07:00) (103/53 - 166/70)  BP(mean): 80 (02 Apr 2020 07:00) (70 - 100)  RR: 25 (02 Apr 2020 07:00) (20 - 33)  SpO2: 100% (02 Apr 2020 07:00) (96% - 100%)    PHYSICAL EXAM:  General: intubated  HEENT: NCAT  CV: RRR  Lungs: symmetric chest expansion  Skin: no rash  Neuro: sedated  Lines     FH: Non-contributory  Social Hx: Non-contributory    TESTS & MEASUREMENTS:                        9.1    10.64 )-----------( 229      ( 01 Apr 2020 23:30 )             27.7     04-01    139  |  104  |  24<H>  ----------------------------<  274<H>  4.8   |  23  |  1.0    Ca    8.3<L>      01 Apr 2020 23:30  Phos  3.7     04-01  Mg     2.2     04-01    TPro  4.6<L>  /  Alb  2.2<L>  /  TBili  0.5  /  DBili  x   /  AST  18  /  ALT  19  /  AlkPhos  123<H>  04-01    eGFR if Non African American: 56 mL/min/1.73M2 (04-01-20 @ 23:30)  eGFR if : 65 mL/min/1.73M2 (04-01-20 @ 23:30)  eGFR if Non African American: 63 mL/min/1.73M2 (04-01-20 @ 15:51)  eGFR if : 74 mL/min/1.73M2 (04-01-20 @ 15:51)    LIVER FUNCTIONS - ( 01 Apr 2020 23:30 )  Alb: 2.2 g/dL / Pro: 4.6 g/dL / ALK PHOS: 123 U/L / ALT: 19 U/L / AST: 18 U/L / GGT: x               Culture - Blood (collected 03-27-20 @ 23:00)  Source: .Blood Blood-Peripheral  Final Report (04-01-20 @ 07:00):    No Growth Final    Culture - Blood (collected 03-27-20 @ 23:00)  Source: .Blood Blood-Peripheral  Final Report (04-01-20 @ 07:00):    No Growth Final            INFECTIOUS DISEASES TESTING  Procalcitonin, Serum: 1.25 (03-31-20 @ 17:51)  Procalcitonin, Serum: 2.13 (03-31-20 @ 05:30)  Procalcitonin, Serum: 0.48 (03-29-20 @ 06:13)  Procalcitonin, Serum: 0.48 (03-28-20 @ 11:44)  COVID-19 PCR: Detected (03-27-20 @ 23:00)  Flu B Result: Negative (03-27-20 @ 23:00)  RSV Result: Negative (03-27-20 @ 23:00)  Flu A Result: Negative (03-27-20 @ 23:00)      INFLAMMATORY MARKERS  Sedimentation Rate, Erythrocyte: 128 mm/Hr (04-01-20 @ 23:30)  Sedimentation Rate, Erythrocyte: 107 mm/Hr (04-01-20 @ 00:00)  C-Reactive Protein, Serum: 41.26 mg/dL (03-31-20 @ 17:51)  C-Reactive Protein, Serum: 41.84 mg/dL (03-31-20 @ 05:30)      RADIOLOGY & ADDITIONAL TESTS:  I have personally reviewed the last available Chest xray  CXR      CT      CARDIOLOGY TESTING  12 Lead ECG:   Systolic  mmHg    Diastolic BP 61 mmHg    Ventricular Rate 82 BPM    Atrial Rate 82 BPM    P-R Interval 136 ms    QRS Duration 80 ms    Q-T Interval 412 ms    QTC Calculation(Bezet) 481 ms    P Axis 43 degrees    R Axis 81 degrees    T Axis 84 degrees    Diagnosis Line Normal sinus rhythm  Low voltage QRS  Septal infarct , age undetermined  Abnormal ECG    Confirmed by Lg Linton ([id number]) on 4/2/2020 7:09:36 AM (04-02-20 @ 02:46)  12 Lead ECG:   Systolic  mmHg    Diastolic  mmHg    Ventricular Rate 129 BPM    Atrial Rate 141 BPM    QRS Duration 78 ms    Q-T Interval 362 ms    QTC Calculation(Bezet) 530 ms    R Axis 70 degrees    T Axis 71 degrees    Diagnosis Line Sinus tachycardia with 1st degree A-V block  Low voltage QRS  Septal infarct , age undetermined  Abnormal ECG    Confirmed by Bjorn Ocampo ([id number]) on 4/1/2020 9:38:03 AM (04-01-20 @ 07:36)      MEDICATIONS  ascorbic acid 1000  atorvastatin 40  azithromycin   Tablet 250  cefepime   IVPB 2000  chlorhexidine 0.12% Liquid 15  clopidogrel Tablet 75  guaifenesin/dextromethorphan  Syrup 10  heparin  Injectable 5000  hydroxychloroquine   hydroxychloroquine 200  insulin regular Infusion 1  lactated ringers. 1000  methylPREDNISolone sodium succinate Injectable 60  midazolam Infusion 0.02  morphine  Infusion 2  norepinephrine Infusion 0.05  pantoprazole  Injectable 40  senna 2  zinc sulfate 220      WEIGHT  Weight (kg): 68 (03-27-20 @ 20:32)  Creatinine, Serum: 1.0 mg/dL (04-01-20 @ 23:30)  Creatinine, Serum: 0.9 mg/dL (04-01-20 @ 15:51)      ANTIBIOTICS:  azithromycin   Tablet 250 milliGRAM(s) Oral daily  cefepime   IVPB 2000 milliGRAM(s) IV Intermittent every 12 hours  hydroxychloroquine   Oral   hydroxychloroquine 200 milliGRAM(s) Oral every 12 hours      All available historical records have been reviewed

## 2020-04-02 NOTE — PROGRESS NOTE ADULT - SUBJECTIVE AND OBJECTIVE BOX
TRICIA SANTIZO  [id number]  75y Female    Indication for ICU admission:  acute respiratory failure COVID-19 positive, pneumonia, HD instability  Admit Date: 3/27  ICU Date:  3/31    No Known Allergies    PAST MEDICAL & SURGICAL HISTORY:  Hypothyroidism: Hypothyroidism  Essential hypertension: HTN (hypertension)  Arthropathy: Arthritis  Uncontrolled type 2 diabetes mellitus: Diabetes mellitus type II, uncontrolled  Hyperlipidemia: Hyperlipidemia  H/O heart artery stent    Home Medications:  glyBURIDE 5 mg oral tablet: 1 tab(s) orally once a day (28 Mar 2020 05:20)  lisinopril 20 mg oral tablet: 1 tab(s) orally once a day (09 May 2018 12:13)  metFORMIN 500 mg oral tablet, extended release: 2 tab(s) orally 2 times a day (28 Mar 2020 05:19)  Plavix 75 mg oral tablet: 1 tab(s) orally once a day (09 May 2018 12:13)  rosuvastatin 20 mg oral tablet: 1 tab(s) orally once a day (28 Mar 2020 05:19)        24HRS EVENT:    NEURO:  Sedation w/ versed/ morphine  Titrate to RASS of -2    RESP:      Acute respiratory failure      Intubated with a 7.5 tube on 3/30      /25/100/15       AM ABG       Daily CXR      Avoid fluid overload      Solumedrol 60q12      [facility]     CARDS:      CAD s/p cardiac stent- continue ASA, not on B-blockers at home      Levo, MAP >75       Serial cardiac enzymes .04 > .03> 0.08      Lisinopril at home, discontinued         GI- TFG;Glucerna 20 ml/hr         LR @110/hr       PPI w Protonix and Senna for bowel regimen    Renal      Preserved renal function      Gentle hydration with LR @ 110 ml/hr      BUN/Cr 24/1.0      Na 139/ K 4.8/ Mg 2.2/ Phos 3.7         Heme-      H&H stable at 9.1      On HSQ      SCD    6) ID-      T- max 101.4 axillary      COVID -19 + with respiratory failure      Procalcitonin 1.25      Mild leukocytosis with leukopenia      Azithromycin and Plaquenil both end 4/3, started Cefepime q12 as per ID    Endocrine     IDDM on lantus at home     On insulin gtt     H/O Hypothyroidism not on medications-  TSH 2.2    Lines:   L IJ TLC, R radial A line, Deutsch, OGT TRICIA SANTIZO  [id number]  75y Female    Indication for ICU admission:  acute respiratory failure COVID-19 positive, pneumonia, HD instability  Admit Date: 3/27  ICU Date:  3/31    No Known Allergies    PAST MEDICAL & SURGICAL HISTORY:  Hypothyroidism: Hypothyroidism  Essential hypertension: HTN (hypertension)  Arthropathy: Arthritis  Uncontrolled type 2 diabetes mellitus: Diabetes mellitus type II, uncontrolled  Hyperlipidemia: Hyperlipidemia  H/O heart artery stent    Home Medications:  glyBURIDE 5 mg oral tablet: 1 tab(s) orally once a day (28 Mar 2020 05:20)  lisinopril 20 mg oral tablet: 1 tab(s) orally once a day (09 May 2018 12:13)  metFORMIN 500 mg oral tablet, extended release: 2 tab(s) orally 2 times a day (28 Mar 2020 05:19)  Plavix 75 mg oral tablet: 1 tab(s) orally once a day (09 May 2018 12:13)  rosuvastatin 20 mg oral tablet: 1 tab(s) orally once a day (28 Mar 2020 05:19)        24HRS EVENT:    NEURO:  Sedation w/ versed/ morphine  Titrate to RASS of -2    RESP:      Acute respiratory failure      Intubated with a 7.5 tube on 3/30      /25/80/15       AM ABG       Daily CXR      Avoid fluid overload      Solumedrol 60q12      [facility]     CARDS:      CAD s/p cardiac stent- continue ASA, not on B-blockers at home      Levo, MAP >75       Serial cardiac enzymes .04 > .03> 0.08      Lisinopril at home, discontinued         GI- TFG;Glucerna 20 ml/hr         LR @110/hr       PPI w Protonix and Senna for bowel regimen    Renal      Preserved renal function      Gentle hydration with LR @ 110 ml/hr      BUN/Cr 24/1.0      Na 139/ K 4.8/ Mg 2.2/ Phos 3.7         Heme-      H&H stable at 9.1      On HSQ      SCD    6) ID-      T- max 101.4 axillary      COVID -19 + with respiratory failure      Procalcitonin 1.25      Mild leukocytosis with leukopenia      Azithromycin and Plaquenil both end 4/3, started Cefepime q12 as per ID    Endocrine     IDDM on lantus at home     On insulin gtt     H/O Hypothyroidism not on medications-  TSH 2.2    Lines:   L IJ TLC, R radial A line, Deutsch, OGT       Daily     Daily Weight in k (2020 16:00)    Diet, NPO with Tube Feed:   Tube Feeding Modality: Nasogastric  Glucerna 1.2 Oscar  Total Volume for 24 Hours (mL): 480  Continuous  Starting Tube Feed Rate mL per Hour: 20  Until Goal Tube Feed Rate (mL per Hour): 20  Tube Feed Duration (in Hours): 24  Tube Feed Start Time: 11:30 (20 @ 11:07)      CURRENT MEDS:  Neurologic Medications  acetaminophen   Tablet .. 650 milliGRAM(s) Oral every 6 hours PRN Temp greater or equal to 38C (100.4F)  acetaminophen   Tablet .. 650 milliGRAM(s) Oral every 6 hours PRN Temp greater or equal to 38C (100.4F)  midazolam Infusion 0.02 mG/kG/Hr IV Continuous <Continuous>  morphine  Infusion 2 mG/Hr IV Continuous <Continuous>    Respiratory Medications  guaifenesin/dextromethorphan  Syrup 10 milliLiter(s) Oral every 8 hours    Cardiovascular Medications  norepinephrine Infusion 0.05 MICROgram(s)/kG/Min IV Continuous <Continuous>    Gastrointestinal Medications  ascorbic acid 1000 milliGRAM(s) Oral two times a day  lactated ringers. 1000 milliLiter(s) IV Continuous <Continuous>  pantoprazole  Injectable 40 milliGRAM(s) IV Push daily  senna 2 Tablet(s) Oral at bedtime  zinc sulfate 220 milliGRAM(s) Oral daily    Genitourinary Medications    Hematologic/Oncologic Medications  clopidogrel Tablet 75 milliGRAM(s) Oral daily  heparin  Injectable 5000 Unit(s) SubCutaneous every 8 hours    Antimicrobial/Immunologic Medications  azithromycin   Tablet 250 milliGRAM(s) Oral daily  cefepime   IVPB 2000 milliGRAM(s) IV Intermittent every 12 hours  hydroxychloroquine   Oral   hydroxychloroquine 200 milliGRAM(s) Oral every 12 hours    Endocrine/Metabolic Medications  atorvastatin 40 milliGRAM(s) Oral at bedtime  insulin regular Infusion 1 Unit(s)/Hr IV Continuous <Continuous>  methylPREDNISolone sodium succinate Injectable 60 milliGRAM(s) IV Push every 12 hours    Topical/Other Medications  chlorhexidine 0.12% Liquid 15 milliLiter(s) Oral Mucosa two times a day      ICU Vital Signs Last 24 Hrs  T(C): 35.9 (2020 09:00), Max: 37.7 (2020 22:00)  T(F): 96.6 (2020 09:00), Max: 99.9 (2020 22:00)  HR: 73 (2020 11:00) (73 - 101)  BP: 133/64 (2020 09:00) (105/55 - 166/70)  BP(mean): 68 (2020 08:00) (68 - 100)  ABP: 125/49 (2020 11:00) (90/55 - 181/67)  ABP(mean): 74 (2020 11:00) (63 - 100)  RR: 22 (2020 11:00) (20 - 33)  SpO2: 100% (2020 11:00) (99% - 100%)      Adult Advanced Hemodynamics Last 24 Hrs  CVP(mm Hg): --  CVP(cm H2O): --  CO: --  CI: --  PA: --  PA(mean): --  PCWP: --  SVR: --  SVRI: --  PVR: --  PVRI: --    Mode: AC/ CMV (Assist Control/ Continuous Mandatory Ventilation)  RR (machine): 25  TV (machine): 400  FiO2: 80  PEEP: 15  ITime: 1  MAP: 21  PIP: 37    ABG - ( 2020 03:01 )  pH, Arterial: 7.29  pH, Blood: x     /  pCO2: 49    /  pO2: 259   / HCO3: 24    / Base Excess: -3.2  /  SaO2: 100                 I&O's Summary    2020 07:  -  2020 07:00  --------------------------------------------------------  IN: 4192.8 mL / OUT: 645 mL / NET: 3547.8 mL    :  -  2020 13:45  --------------------------------------------------------  IN: 562.5 mL / OUT: 158 mL / NET: 404.5 mL      I&O's Detail    2020 07: 07:00  --------------------------------------------------------  IN:    Glucerna: 320 mL    insulin regular Infusion: 32 mL    IV PiggyBack: 500 mL    Lactated Ringers IV Bolus: 500 mL    lactated ringers.: 2640 mL    midazolam Infusion: 81.4 mL    morphine  Infusion: 30 mL    norepinephrine Infusion: 30.2 mL    norepinephrine Infusion: 59.2 mL  Total IN: 4192.8 mL    OUT:    Indwelling Catheter - Urethral: 645 mL  Total OUT: 645 mL    Total NET: 3547.8 mL      2020 07:  -  2020 13:45  --------------------------------------------------------  IN:    Glucerna: 100 mL    insulin regular Infusion: 70 mL    lactated ringers.: 330 mL    midazolam Infusion: 18.1 mL    morphine  Infusion: 10 mL    norepinephrine Infusion: 34.4 mL  Total IN: 562.5 mL    OUT:    Indwelling Catheter - Urethral: 158 mL  Total OUT: 158 mL    Total NET: 404.5 mL          PHYSICAL EXAM:    General/Neuro  RASS:             GCS:     = E   / V   / M      Deficits:                             alert & oriented x 3, no focal deficits  Pupils:    Lungs:      clear to auscultation, Normal expansion/effort. intubated, vented    Cardiovascular : S1, S2.  irregular rate and rhythm.  Peripheral edema   Cardiac Rhythm: afib w/ rvr    GI: Abdomen soft, Non-tender, Non-distended.    Nasogastric tube in place.   Wound:    Extremities: Extremities warm, pink, well-perfused. Pulses:Rt     Lt    Derm: Good skin turgor, no skin breakdown.      :       Deutsch catheter in place.      CXR: < from: Xray Chest 1 View- PORTABLE-Routine (20 @ 08:22) >  Stable bilateral opacities     Lines and support devices as described above. Endotracheal tube terminating at the thoracic inlet; advancement is recommended.    < end of copied text >      LABS:  CAPILLARY BLOOD GLUCOSE      POCT Blood Glucose.: 97 mg/dL (2020 13:29)  POCT Blood Glucose.: 165 mg/dL (2020 09:23)  POCT Blood Glucose.: 176 mg/dL (2020 08:20)  POCT Blood Glucose.: 189 mg/dL (2020 07:03)  POCT Blood Glucose.: 225 mg/dL (2020 05:20)  POCT Blood Glucose.: 233 mg/dL (2020 03:52)  POCT Blood Glucose.: 250 mg/dL (2020 02:37)  POCT Blood Glucose.: 276 mg/dL (2020 01:26)  POCT Blood Glucose.: 122 mg/dL (2020 20:34)  POCT Blood Glucose.: 126 mg/dL (2020 19:09)  POCT Blood Glucose.: 132 mg/dL (2020 18:30)  POCT Blood Glucose.: 121 mg/dL (2020 17:05)                          9.1    10.64 )-----------( 229      ( 2020 23:30 )             27.7       04-    139  |  104  |  24<H>  ----------------------------<  274<H>  4.8   |  23  |  1.0    Ca    8.3<L>      :30  Phos  3.7     04  Mg     2.2         TPro  4.6<L>  /  Alb  2.2<L>  /  TBili  0.5  /  DBili  x   /  AST  18  /  ALT  19  /  AlkPhos  123<H>        PT/INR - ( :30 )   PT: 14.20 sec;   INR: 1.23 ratio         PTT - ( : )  PTT:41.4 sec  CARDIAC MARKERS (  )  x     / 0.08 ng/mL / 29 U/L / x     / x TRICIA SANTIZO  [id number]  75y Female    Indication for ICU admission:  acute respiratory failure COVID-19 positive, pneumonia, HD instability  Admit Date: 3/27  ICU Date:  3/31    No Known Allergies    PAST MEDICAL & SURGICAL HISTORY:  Hypothyroidism: Hypothyroidism  Essential hypertension: HTN (hypertension)  Arthropathy: Arthritis  Uncontrolled type 2 diabetes mellitus: Diabetes mellitus type II, uncontrolled  Hyperlipidemia: Hyperlipidemia  H/O heart artery stent    Home Medications:  glyBURIDE 5 mg oral tablet: 1 tab(s) orally once a day (28 Mar 2020 05:20)  lisinopril 20 mg oral tablet: 1 tab(s) orally once a day (09 May 2018 12:13)  metFORMIN 500 mg oral tablet, extended release: 2 tab(s) orally 2 times a day (28 Mar 2020 05:19)  Plavix 75 mg oral tablet: 1 tab(s) orally once a day (09 May 2018 12:13)  rosuvastatin 20 mg oral tablet: 1 tab(s) orally once a day (28 Mar 2020 05:19)        24HRS EVENT:    NEURO:  Sedation w/ versed/ morphine  Titrate to RASS of -2    RESP:      Acute respiratory failure      Intubated with a 7.5 tube on 3/30      /25/80/15       AM ABG       Daily CXR      Avoid fluid overload      Solumedrol 60q12      [facility]     CARDS:      CAD s/p cardiac stent- continue ASA, not on B-blockers at home      Levo, MAP >75       Serial cardiac enzymes .04 > .03> 0.08      Lisinopril at home, discontinued         GI- TFG;Glucerna 20 ml/hr         LR @110/hr       PPI w Protonix and Senna for bowel regimen    Renal      Preserved renal function      Gentle hydration with LR @ 110 ml/hr      BUN/Cr 24/1.0      Na 139/ K 4.8/ Mg 2.2/ Phos 3.7         Heme-      H&H stable at 9.1      On HSQ      SCD    6) ID-      T- max 101.4 axillary      COVID -19 + with respiratory failure      Procalcitonin 1.25      Mild leukocytosis with leukopenia      Azithromycin and Plaquenil both end 4/3, started Cefepime q12 as per ID    Endocrine     IDDM on lantus at home     On insulin gtt     H/O Hypothyroidism not on medications-  TSH 2.2    Lines:   L IJ TLC, R radial A line, Deutsch, OGT       Daily     Daily Weight in k (2020 16:00)    Diet, NPO with Tube Feed:   Tube Feeding Modality: Nasogastric  Glucerna 1.2 Oscar  Total Volume for 24 Hours (mL): 480  Continuous  Starting Tube Feed Rate mL per Hour: 20  Until Goal Tube Feed Rate (mL per Hour): 20  Tube Feed Duration (in Hours): 24  Tube Feed Start Time: 11:30 (20 @ 11:07)      CURRENT MEDS:  Neurologic Medications  acetaminophen   Tablet .. 650 milliGRAM(s) Oral every 6 hours PRN Temp greater or equal to 38C (100.4F)  acetaminophen   Tablet .. 650 milliGRAM(s) Oral every 6 hours PRN Temp greater or equal to 38C (100.4F)  midazolam Infusion 0.02 mG/kG/Hr IV Continuous <Continuous>  morphine  Infusion 2 mG/Hr IV Continuous <Continuous>    Respiratory Medications  guaifenesin/dextromethorphan  Syrup 10 milliLiter(s) Oral every 8 hours    Cardiovascular Medications  norepinephrine Infusion 0.05 MICROgram(s)/kG/Min IV Continuous <Continuous>    Gastrointestinal Medications  ascorbic acid 1000 milliGRAM(s) Oral two times a day  lactated ringers. 1000 milliLiter(s) IV Continuous <Continuous>  pantoprazole  Injectable 40 milliGRAM(s) IV Push daily  senna 2 Tablet(s) Oral at bedtime  zinc sulfate 220 milliGRAM(s) Oral daily    Genitourinary Medications    Hematologic/Oncologic Medications  clopidogrel Tablet 75 milliGRAM(s) Oral daily  heparin  Injectable 5000 Unit(s) SubCutaneous every 8 hours    Antimicrobial/Immunologic Medications  azithromycin   Tablet 250 milliGRAM(s) Oral daily  cefepime   IVPB 2000 milliGRAM(s) IV Intermittent every 12 hours  hydroxychloroquine   Oral   hydroxychloroquine 200 milliGRAM(s) Oral every 12 hours    Endocrine/Metabolic Medications  atorvastatin 40 milliGRAM(s) Oral at bedtime  insulin regular Infusion 1 Unit(s)/Hr IV Continuous <Continuous>  methylPREDNISolone sodium succinate Injectable 60 milliGRAM(s) IV Push every 12 hours    Topical/Other Medications  chlorhexidine 0.12% Liquid 15 milliLiter(s) Oral Mucosa two times a day      ICU Vital Signs Last 24 Hrs  T(C): 35.9 (2020 09:00), Max: 37.7 (2020 22:00)  T(F): 96.6 (2020 09:00), Max: 99.9 (2020 22:00)  HR: 73 (2020 11:00) (73 - 101)  BP: 133/64 (2020 09:00) (105/55 - 166/70)  BP(mean): 68 (2020 08:00) (68 - 100)  ABP: 125/49 (2020 11:00) (90/55 - 181/67)  ABP(mean): 74 (2020 11:00) (63 - 100)  RR: 22 (2020 11:00) (20 - 33)  SpO2: 100% (2020 11:00) (99% - 100%)      Adult Advanced Hemodynamics Last 24 Hrs  CVP(mm Hg): --  CVP(cm H2O): --  CO: --  CI: --  PA: --  PA(mean): --  PCWP: --  SVR: --  SVRI: --  PVR: --  PVRI: --    Mode: AC/ CMV (Assist Control/ Continuous Mandatory Ventilation)  RR (machine): 25  TV (machine): 400  FiO2: 80  PEEP: 15  ITime: 1  MAP: 21  PIP: 37    ABG - ( 2020 03:01 )  pH, Arterial: 7.29  pH, Blood: x     /  pCO2: 49    /  pO2: 259   / HCO3: 24    / Base Excess: -3.2  /  SaO2: 100                 I&O's Summary    2020 07:  -  2020 07:00  --------------------------------------------------------  IN: 4192.8 mL / OUT: 645 mL / NET: 3547.8 mL    :  -  2020 13:45  --------------------------------------------------------  IN: 562.5 mL / OUT: 158 mL / NET: 404.5 mL      I&O's Detail    2020 07: 07:00  --------------------------------------------------------  IN:    Glucerna: 320 mL    insulin regular Infusion: 32 mL    IV PiggyBack: 500 mL    Lactated Ringers IV Bolus: 500 mL    lactated ringers.: 2640 mL    midazolam Infusion: 81.4 mL    morphine  Infusion: 30 mL    norepinephrine Infusion: 30.2 mL    norepinephrine Infusion: 59.2 mL  Total IN: 4192.8 mL    OUT:    Indwelling Catheter - Urethral: 645 mL  Total OUT: 645 mL    Total NET: 3547.8 mL      2020 07:  -  2020 13:45  --------------------------------------------------------  IN:    Glucerna: 100 mL    insulin regular Infusion: 70 mL    lactated ringers.: 330 mL    midazolam Infusion: 18.1 mL    morphine  Infusion: 10 mL    norepinephrine Infusion: 34.4 mL  Total IN: 562.5 mL    OUT:    Indwelling Catheter - Urethral: 158 mL  Total OUT: 158 mL    Total NET: 404.5 mL          PHYSICAL EXAM:    General/Neuro  RASS:             GCS:     = E   / V   / M      Deficits:                             alert & oriented x 3, no focal deficits  Pupils:    Lungs:      clear to auscultation, Normal expansion/effort. intubated, vented    Cardiovascular : S1, S2.  regular rate and rhythm.  Peripheral edema   Cardiac Rhythm: NSR    GI: Abdomen soft, Non-tender, Non-distended.    Nasogastric tube in place.   Wound:    Extremities: Extremities warm, pink, well-perfused. Pulses:Rt     Lt    Derm: Good skin turgor, no skin breakdown.      :       Deutsch catheter in place.      CXR: < from: Xray Chest 1 View- PORTABLE-Routine (20 @ 08:22) >  Stable bilateral opacities     Lines and support devices as described above. Endotracheal tube terminating at the thoracic inlet; advancement is recommended.    < end of copied text >      LABS:  CAPILLARY BLOOD GLUCOSE      POCT Blood Glucose.: 97 mg/dL (2020 13:29)  POCT Blood Glucose.: 165 mg/dL (2020 09:23)  POCT Blood Glucose.: 176 mg/dL (2020 08:20)  POCT Blood Glucose.: 189 mg/dL (2020 07:03)  POCT Blood Glucose.: 225 mg/dL (2020 05:20)  POCT Blood Glucose.: 233 mg/dL (2020 03:52)  POCT Blood Glucose.: 250 mg/dL (2020 02:37)  POCT Blood Glucose.: 276 mg/dL (2020 01:26)  POCT Blood Glucose.: 122 mg/dL (2020 20:34)  POCT Blood Glucose.: 126 mg/dL (2020 19:09)  POCT Blood Glucose.: 132 mg/dL (2020 18:30)  POCT Blood Glucose.: 121 mg/dL (2020 17:05)                          9.1    10.64 )-----------( 229      ( 2020 23:30 )             27.7       04-    139  |  104  |  24<H>  ----------------------------<  274<H>  4.8   |  23  |  1.0    Ca    8.3<L>      :30  Phos  3.7     04-  Mg     2.2         TPro  4.6<L>  /  Alb  2.2<L>  /  TBili  0.5  /  DBili  x   /  AST  18  /  ALT  19  /  AlkPhos  123<H>        PT/INR - ( :30 )   PT: 14.20 sec;   INR: 1.23 ratio         PTT - ( :30 )  PTT:41.4 sec  CARDIAC MARKERS (  )  x     / 0.08 ng/mL / 29 U/L / x     / x

## 2020-04-03 NOTE — PROGRESS NOTE ADULT - SUBJECTIVE AND OBJECTIVE BOX
TRICIA SANTIZO  75y, Female  Allergy: No Known Allergies      LOS  6d    CHIEF COMPLAINT: Syncope, Pneumonia  R/O COVID (03 Apr 2020 01:40)      INTERVAL EVENTS/HPI  - T(F): , Max: 97.7 (04-02-20 @ 23:00) CXR continue bilateral opacities R>L  - WBC Count: 17.04 (04-03-20 @ 01:50)  WBC Count: 10.64 (04-01-20 @ 23:30)  - Creatinine, Serum: 1.3 (04-03-20 @ 01:50)  Creatinine, Serum: 1.2 (04-02-20 @ 17:40)       ROS  unable to obtain history secondary to patient's mental status and/or sedation     VITALS:  T(F): 97.7, Max: 97.7 (04-02-20 @ 23:00)  HR: 65  BP: 130/61  RR: 25Vital Signs Last 24 Hrs  T(C): 36.5 (02 Apr 2020 23:00), Max: 36.5 (02 Apr 2020 23:00)  T(F): 97.7 (02 Apr 2020 23:00), Max: 97.7 (02 Apr 2020 23:00)  HR: 65 (03 Apr 2020 06:07) (65 - 81)  BP: 130/61 (02 Apr 2020 12:00) (117/56 - 133/64)  BP(mean): 88 (02 Apr 2020 12:00) (68 - 88)  RR: 25 (03 Apr 2020 06:07) (22 - 25)  SpO2: 100% (03 Apr 2020 06:07) (100% - 100%)    PHYSICAL EXAM:  General: intubated  HEENT: NCAT  CV: RRR  Lungs: symmetric chest expansion  Skin: no rash  Neuro: sedated  Lines     FH: Non-contributory  Social Hx: Non-contributory    TESTS & MEASUREMENTS:                        9.3    17.04 )-----------( 358      ( 03 Apr 2020 01:50 )             27.9     04-03    141  |  106  |  34<H>  ----------------------------<  212<H>  4.8   |  22  |  1.3    Ca    8.7      03 Apr 2020 01:50  Phos  3.8     04-03  Mg     2.2     04-03    TPro  4.7<L>  /  Alb  2.2<L>  /  TBili  0.3  /  DBili  x   /  AST  18  /  ALT  25  /  AlkPhos  127<H>  04-03    eGFR if Non African American: 41 mL/min/1.73M2 (04-03-20 @ 01:50)  eGFR if African American: 47 mL/min/1.73M2 (04-03-20 @ 01:50)  eGFR if Non African American: 45 mL/min/1.73M2 (04-02-20 @ 17:40)  eGFR if : 52 mL/min/1.73M2 (04-02-20 @ 17:40)    LIVER FUNCTIONS - ( 03 Apr 2020 01:50 )  Alb: 2.2 g/dL / Pro: 4.7 g/dL / ALK PHOS: 127 U/L / ALT: 25 U/L / AST: 18 U/L / GGT: x               Culture - Blood (collected 03-27-20 @ 23:00)  Source: .Blood Blood-Peripheral  Final Report (04-01-20 @ 07:00):    No Growth Final    Culture - Blood (collected 03-27-20 @ 23:00)  Source: .Blood Blood-Peripheral  Final Report (04-01-20 @ 07:00):    No Growth Final            INFECTIOUS DISEASES TESTING  Procalcitonin, Serum: 3.66 (04-01-20 @ 23:30)  Procalcitonin, Serum: 1.25 (03-31-20 @ 17:51)  Procalcitonin, Serum: 2.13 (03-31-20 @ 05:30)  Procalcitonin, Serum: 0.48 (03-29-20 @ 06:13)  Procalcitonin, Serum: 0.48 (03-28-20 @ 11:44)  COVID-19 PCR: Detected (03-27-20 @ 23:00)  Flu B Result: Negative (03-27-20 @ 23:00)  RSV Result: Negative (03-27-20 @ 23:00)  Flu A Result: Negative (03-27-20 @ 23:00)      INFLAMMATORY MARKERS  Sedimentation Rate, Erythrocyte: 128 mm/Hr (04-01-20 @ 23:30)  C-Reactive Protein, Serum: 40.81 mg/dL (04-01-20 @ 23:30)  Sedimentation Rate, Erythrocyte: 107 mm/Hr (04-01-20 @ 00:00)  C-Reactive Protein, Serum: 41.26 mg/dL (03-31-20 @ 17:51)      RADIOLOGY & ADDITIONAL TESTS:  I have personally reviewed the last available Chest xray  CXR      CT      CARDIOLOGY TESTING  12 Lead ECG:   Systolic  mmHg    Diastolic BP 61 mmHg    Ventricular Rate 82 BPM    Atrial Rate 82 BPM    P-R Interval 136 ms    QRS Duration 80 ms    Q-T Interval 412 ms    QTC Calculation(Bezet) 481 ms    P Axis 43 degrees    R Axis 81 degrees    T Axis 84 degrees    Diagnosis Line Normal sinus rhythm  Low voltage QRS  Septal infarct , age undetermined  Abnormal ECG    Confirmed by Lg Linton ([id number]) on 4/2/2020 7:09:36 AM (04-02-20 @ 02:46)  12 Lead ECG:   Systolic  mmHg    Diastolic  mmHg    Ventricular Rate 129 BPM    Atrial Rate 141 BPM    QRS Duration 78 ms    Q-T Interval 362 ms    QTC Calculation(Bezet) 530 ms    R Axis 70 degrees    T Axis 71 degrees    Diagnosis Line Sinus tachycardia with 1st degree A-V block  Low voltage QRS  Septal infarct , age undetermined  Abnormal ECG    Confirmed by Bjorn Ocampo ([id number]) on 4/1/2020 9:38:03 AM (04-01-20 @ 07:36)      MEDICATIONS  ascorbic acid 1000  atorvastatin 40  azithromycin   Tablet 250  cefepime   IVPB 2000  chlorhexidine 0.12% Liquid 15  clopidogrel Tablet 75  guaifenesin/dextromethorphan  Syrup 10  heparin  Injectable 5000  insulin regular Infusion 1  lactated ringers. 1000  methylPREDNISolone sodium succinate Injectable 60  midazolam Infusion 0.02  morphine  Infusion 2  norepinephrine Infusion 0.05  pantoprazole  Injectable 40  senna 2  zinc sulfate 220      WEIGHT  Weight (kg): 68 (03-27-20 @ 20:32)  Creatinine, Serum: 1.3 mg/dL (04-03-20 @ 01:50)  Creatinine, Serum: 1.2 mg/dL (04-02-20 @ 17:40)      ANTIBIOTICS:  azithromycin   Tablet 250 milliGRAM(s) Oral daily  cefepime   IVPB 2000 milliGRAM(s) IV Intermittent every 12 hours      All available historical records have been reviewed

## 2020-04-03 NOTE — PROGRESS NOTE ADULT - SUBJECTIVE AND OBJECTIVE BOX
Patient is a 75y old  Female who presents with a chief complaint of Syncope, Pneumonia  R/O COVID (02 Apr 2020 07:52)      HPI:  Patient is a 74 yo female with medical hx of HTN, DM II, Hypothyroidism HLD BIBA for evaluation of syncope, Pt also endorses Cough generalized weakness and fever of 101F  for 3-4 days duration. Patient states she fell at home while trying to get out of bed, she remembered feeling weak and nauseous  right before the fall. Pt also states that her Son gave her CPR as he couldn't feel her pulse after she collapsed, CPR lasted for about one minute before achieving ROSC. No HT, No AC use, + LOC and AP use    Patient  is also sick at home with similar symptoms, she denied known covid exposure and recent travel. Denies Pain, headache CP, palpitation, Abdominal pain,  Diarrhea , numbness, tingling, urinary symptoms    Vitals in ED remarkable for elevated /90  CXR: B/L Peripheral and Interstitial opacities (28 Mar 2020 03:24)      PAST MEDICAL & SURGICAL HISTORY:  Hypothyroidism: Hypothyroidism  Essential hypertension: HTN (hypertension)  Arthropathy: Arthritis  Uncontrolled type 2 diabetes mellitus: Diabetes mellitus type II, uncontrolled  Hyperlipidemia: Hyperlipidemia  H/O heart artery stent      PREVIOUS DIAGNOSTIC TESTING:      ECHO  FINDINGS:    STRESS  FINDINGS:    CATHETERIZATION  FINDINGS:    MEDICATIONS  (STANDING):  ascorbic acid 1000 milliGRAM(s) Oral two times a day  atorvastatin 40 milliGRAM(s) Oral at bedtime  azithromycin   Tablet 250 milliGRAM(s) Oral daily  cefepime   IVPB 2000 milliGRAM(s) IV Intermittent every 12 hours  chlorhexidine 0.12% Liquid 15 milliLiter(s) Oral Mucosa two times a day  clopidogrel Tablet 75 milliGRAM(s) Oral daily  guaifenesin/dextromethorphan  Syrup 10 milliLiter(s) Oral every 8 hours  heparin  Injectable 5000 Unit(s) SubCutaneous every 8 hours  hydroxychloroquine   Oral   hydroxychloroquine 200 milliGRAM(s) Oral every 12 hours  insulin regular Infusion 1 Unit(s)/Hr (1 mL/Hr) IV Continuous <Continuous>  lactated ringers. 1000 milliLiter(s) (110 mL/Hr) IV Continuous <Continuous>  methylPREDNISolone sodium succinate Injectable 60 milliGRAM(s) IV Push every 12 hours  midazolam Infusion 0.02 mG/kG/Hr (1.36 mL/Hr) IV Continuous <Continuous>  morphine  Infusion 2 mG/Hr (2 mL/Hr) IV Continuous <Continuous>  norepinephrine Infusion 0.05 MICROgram(s)/kG/Min (3.19 mL/Hr) IV Continuous <Continuous>  pantoprazole  Injectable 40 milliGRAM(s) IV Push daily  senna 2 Tablet(s) Oral at bedtime  zinc sulfate 220 milliGRAM(s) Oral daily    MEDICATIONS  (PRN):  acetaminophen   Tablet .. 650 milliGRAM(s) Oral every 6 hours PRN Temp greater or equal to 38C (100.4F)  acetaminophen   Tablet .. 650 milliGRAM(s) Oral every 6 hours PRN Temp greater or equal to 38C (100.4F)      FAMILY HISTORY:      SOCIAL HISTORY:  CIGARETTES:    ALCOHOL:    REVIEW OF SYSTEMS:  CONSTITUTIONAL: No fever, weight loss, or fatigue  NECK: No pain or stiffness  RESPIRATORY: No cough, wheezing, chills or hemoptysis; No shortness of breath  CARDIOVASCULAR: No chest pain, palpitations, dizziness, or leg swelling  GASTROINTESTINAL: No abdominal or epigastric pain. No nausea, vomiting, or hematemesis; No diarrhea or constipation. No melena or hematochezia.  GENITOURINARY: No dysuria, frequency, hematuria, or incontinence  NEUROLOGICAL: No headaches, memory loss, loss of strength, numbness, or tremors  SKIN: No itching, burning, rashes, or lesions   ENDOCRINE: No heat or cold intolerance; No hair loss  MUSCULOSKELETAL: No joint pain or swelling; No muscle, back, or extremity pain  HEME/LYMPH: No easy bruising, or bleeding gums          Vital Signs Last 24 Hrs  T(C): 36.5 (02 Apr 2020 23:00), Max: 36.5 (02 Apr 2020 23:00)  T(F): 97.7 (02 Apr 2020 23:00), Max: 97.7 (02 Apr 2020 23:00)  HR: 73 (03 Apr 2020 00:00) (70 - 81)  BP: 130/61 (02 Apr 2020 12:00) (105/55 - 133/64)  BP(mean): 88 (02 Apr 2020 12:00) (68 - 88)  RR: 25 (03 Apr 2020 00:00) (22 - 26)  SpO2: 100% (03 Apr 2020 00:00) (100% - 100%)        PHYSICAL EXAM:  GENERAL: remains intubated  CHEST/LUNG: bibasilar rhonchi  HEART: Regular rate and rhythm; No murmurs, rubs, or gallops  ABDOMEN: Soft, Nontender, Nondistended; Bowel sounds present  EXTREMITIES:  2+ Peripheral Pulses, No clubbing, cyanosis, or edema  SKIN: No rashes or lesions    INTERPRETATION OF TELEMETRY:    ECG:    I&O's Detail    01 Apr 2020 07:01  -  02 Apr 2020 07:00  --------------------------------------------------------  IN:    Glucerna: 320 mL    insulin regular Infusion: 32 mL    IV PiggyBack: 500 mL    Lactated Ringers IV Bolus: 500 mL    lactated ringers.: 2640 mL    midazolam Infusion: 81.4 mL    morphine  Infusion: 30 mL    norepinephrine Infusion: 30.2 mL    norepinephrine Infusion: 59.2 mL  Total IN: 4192.8 mL    OUT:    Indwelling Catheter - Urethral: 645 mL  Total OUT: 645 mL    Total NET: 3547.8 mL      02 Apr 2020 07:01  -  03 Apr 2020 01:40  --------------------------------------------------------  IN:    Glucerna: 180 mL    insulin regular Infusion: 104 mL    IV PiggyBack: 4 mL    Lactated Ringers IV Bolus: 500 mL    lactated ringers.: 1760 mL    midazolam Infusion: 56.6 mL    morphine  Infusion: 32 mL    norepinephrine Infusion: 104.8 mL  Total IN: 2741.4 mL    OUT:    Indwelling Catheter - Urethral: 538 mL  Total OUT: 538 mL    Total NET: 2203.4 mL          LABS:                        9.1    10.64 )-----------( 229      ( 01 Apr 2020 23:30 )             27.7     04-02    138  |  103  |  32<H>  ----------------------------<  224<H>  4.6   |  20  |  1.2    Ca    8.7      02 Apr 2020 17:40  Phos  3.7     04-02  Mg     2.2     04-02    TPro  4.6<L>  /  Alb  2.2<L>  /  TBili  0.5  /  DBili  x   /  AST  18  /  ALT  19  /  AlkPhos  123<H>  04-01    CARDIAC MARKERS ( 01 Apr 2020 23:30 )  x     / 0.08 ng/mL / 29 U/L / x     / x          PT/INR - ( 01 Apr 2020 23:30 )   PT: 14.20 sec;   INR: 1.23 ratio         PTT - ( 01 Apr 2020 23:30 )  PTT:41.4 sec    I&O's Summary    01 Apr 2020 07:01  -  02 Apr 2020 07:00  --------------------------------------------------------  IN: 4192.8 mL / OUT: 645 mL / NET: 3547.8 mL    02 Apr 2020 07:01  -  03 Apr 2020 01:40  --------------------------------------------------------  IN: 2741.4 mL / OUT: 538 mL / NET: 2203.4 mL        RADIOLOGY & ADDITIONAL STUDIES:    A/P:  1.  Acute Hypoxic Respiratory Failure/  Septic Shock    somewhat improved, Fio2 lowered to 70%  Levophed  still on   2/2 Covid-19 positive    on HCQ protocol, Vit C 100bid, Zinc, ([id number]=[id number])    2. Syncope - 2/2 dehydration 2/2 covid 19, no evidence of cardiac etiology    3.  CAD - stable    3.  DM II - on insulin drip     overall, slight improvement but remains critical

## 2020-04-03 NOTE — PROGRESS NOTE ADULT - SUBJECTIVE AND OBJECTIVE BOX
TRICIA SANTIZO  [id number]  75y Female    Indication for ICU admission:  Admit Date:03-28-20  ICU Date:  OR Date:    No Known Allergies    PAST MEDICAL & SURGICAL HISTORY:  Hypothyroidism: Hypothyroidism  Essential hypertension: HTN (hypertension)  Arthropathy: Arthritis  Uncontrolled type 2 diabetes mellitus: Diabetes mellitus type II, uncontrolled  Hyperlipidemia: Hyperlipidemia  H/O heart artery stent    Home Medications:  glyBURIDE 5 mg oral tablet: 1 tab(s) orally once a day (28 Mar 2020 05:20)  lisinopril 20 mg oral tablet: 1 tab(s) orally once a day (09 May 2018 12:13)  metFORMIN 500 mg oral tablet, extended release: 2 tab(s) orally 2 times a day (28 Mar 2020 05:19)  Plavix 75 mg oral tablet: 1 tab(s) orally once a day (09 May 2018 12:13)  rosuvastatin 20 mg oral tablet: 1 tab(s) orally once a day (28 Mar 2020 05:19)        24HRS EVENT:  6d      NEURO:  Sedation w/ versed/ morphine  Titrate to RASS of -2    RESP:      Acute respiratory failure      Intubated with a 7.5 tube on 3/30      /25/60/15       AM ABG       Daily CXR      Avoid fluid overload      Solumedrol 60q12      Robutssin DM     CARDS:      CAD s/p cardiac stent- continue ASA, not on B-blockers at home      Levo, MAP >75       Serial cardiac enzymes .04 > .03> 0.08      Lisinopril at home, discontinued         GI- TFG;Glucerna 50 ml/hr         LR @50/hr       PPI w Protonix and Senna for bowel regimen    Renal      Preserved renal function      Gentle hydration with LR @ 50 ml/hr      BUN/Cr 24/1.0      Na 141/ K 4.8/ Mg 2.2/ Phos 3.8         Heme-      H&H stable at 9.3      On HSQ      SCD    6) ID-      T- max 101.4 axillary      COVID -19 + with respiratory failure      Procalcitonin 1.25      Mild leukocytosis with leukopenia      Azithromycin and Plaquenil both end 4/3, started Cefepime q12 as per ID    Endocrine     IDDM on lantus at home     On insulin gtt     H/O Hypothyroidism not on medications-  TSH 2.2          DVT PTX:     GI PTX:    ***Tubes/Lines/Drains  ***  Peripheral IV  Central Venous Line     	Date   Arterial Line		                Date   [] PICC:         	[] Midline		[] Mediport             Urinary Catheter		Indication: Strict I&O    Date Placed:       REVIEW OF SYSTEMS    [ ] A ten-point review of systems was otherwise negative except as noted.  [ ] Due to altered mental status/intubation, subjective information were not able to be obtained from the patient. History was obtained, to the extent possible, from review of the chart and collateral sources of information.  Daily     Daily     Diet, NPO with Tube Feed:   Tube Feeding Modality: Nasogastric  Glucerna 1.2 Oscar  Total Volume for 24 Hours (mL): 1200  Continuous  Starting Tube Feed Rate mL per Hour: 30  Increase Tube Feed Rate by (mL): 10     Every 2 hours  Until Goal Tube Feed Rate (mL per Hour): 50  Tube Feed Duration (in Hours): 24  Tube Feed Start Time: 11:00 (04-03-20 @ 10:42)      CURRENT MEDS:  Neurologic Medications  acetaminophen   Tablet .. 650 milliGRAM(s) Oral every 6 hours PRN Temp greater or equal to 38C (100.4F)  acetaminophen   Tablet .. 650 milliGRAM(s) Oral every 6 hours PRN Temp greater or equal to 38C (100.4F)  midazolam Infusion 0.02 mG/kG/Hr IV Continuous <Continuous>  morphine  Infusion 2 mG/Hr IV Continuous <Continuous>    Respiratory Medications  guaifenesin/dextromethorphan  Syrup 10 milliLiter(s) Oral every 8 hours    Cardiovascular Medications  norepinephrine Infusion 0.05 MICROgram(s)/kG/Min IV Continuous <Continuous>    Gastrointestinal Medications  ascorbic acid 1000 milliGRAM(s) Oral two times a day  lactated ringers. 1000 milliLiter(s) IV Continuous <Continuous>  pantoprazole  Injectable 40 milliGRAM(s) IV Push daily  senna 2 Tablet(s) Oral at bedtime  zinc sulfate 220 milliGRAM(s) Oral daily    Genitourinary Medications    Hematologic/Oncologic Medications  clopidogrel Tablet 75 milliGRAM(s) Oral daily  heparin  Injectable 5000 Unit(s) SubCutaneous every 8 hours    Antimicrobial/Immunologic Medications  cefepime   IVPB 2000 milliGRAM(s) IV Intermittent every 12 hours    Endocrine/Metabolic Medications  atorvastatin 40 milliGRAM(s) Oral at bedtime  insulin regular Infusion 1 Unit(s)/Hr IV Continuous <Continuous>  methylPREDNISolone sodium succinate Injectable 60 milliGRAM(s) IV Push every 12 hours    Topical/Other Medications  chlorhexidine 0.12% Liquid 15 milliLiter(s) Oral Mucosa two times a day      ICU Vital Signs Last 24 Hrs  T(C): 36.5 (03 Apr 2020 12:00), Max: 36.7 (03 Apr 2020 08:00)  T(F): 97.7 (03 Apr 2020 12:00), Max: 98.1 (03 Apr 2020 08:00)  HR: 63 (03 Apr 2020 12:00) (63 - 81)  BP: --  BP(mean): --  ABP: 144/62 (03 Apr 2020 12:00) (126/49 - 145/62)  ABP(mean): 90 (03 Apr 2020 12:00) (73 - 93)  RR: 25 (03 Apr 2020 12:00) (25 - 25)  SpO2: 100% (03 Apr 2020 12:00) (100% - 100%)      Adult Advanced Hemodynamics Last 24 Hrs  CVP(mm Hg): --  CVP(cm H2O): --  CO: --  CI: --  PA: --  PA(mean): --  PCWP: --  SVR: --  SVRI: --  PVR: --  PVRI: --    Mode: AC/ CMV (Assist Control/ Continuous Mandatory Ventilation)  RR (machine): 25  TV (machine): 400  FiO2: 70  PEEP: 15  ITime: 1  MAP: 23  PIP: 37    ABG - ( 03 Apr 2020 03:51 )  pH, Arterial: 7.35  pH, Blood: x     /  pCO2: 42    /  pO2: 185   / HCO3: 23    / Base Excess: -2.3  /  SaO2: 100                 I&O's Summary    02 Apr 2020 07:01  -  03 Apr 2020 07:00  --------------------------------------------------------  IN: 3799.7 mL / OUT: 798 mL / NET: 3001.7 mL    03 Apr 2020 07:01  -  03 Apr 2020 12:16  --------------------------------------------------------  IN: 609 mL / OUT: 265 mL / NET: 344 mL      I&O's Detail    02 Apr 2020 07:01  -  03 Apr 2020 07:00  --------------------------------------------------------  IN:    Glucerna: 320 mL    insulin regular Infusion: 119 mL    IV PiggyBack: 54 mL    Lactated Ringers IV Bolus: 500 mL    lactated ringers.: 2530 mL    midazolam Infusion: 81.1 mL    morphine  Infusion: 46 mL    norepinephrine Infusion: 149.6 mL  Total IN: 3799.7 mL    OUT:    Indwelling Catheter - Urethral: 798 mL  Total OUT: 798 mL    Total NET: 3001.7 mL      03 Apr 2020 07:01  -  03 Apr 2020 12:16  --------------------------------------------------------  IN:    Glucerna: 160 mL    insulin regular Infusion: 26 mL    lactated ringers.: 220 mL    lactated ringers.: 150 mL    midazolam Infusion: 17.5 mL    morphine  Infusion: 10 mL    norepinephrine Infusion: 25.5 mL  Total IN: 609 mL    OUT:    Indwelling Catheter - Urethral: 265 mL  Total OUT: 265 mL    Total NET: 344 mL          PHYSICAL EXAM:    General/Neuro  RASS:             GCS:     = E   / V   / M      Deficits:                             RASS -4  Pupils:    Lungs:      clear to auscultation, Normal expansion/effort.     Cardiovascular : S1, S2.  Regular rate and rhythm.  Peripheral edema   Cardiac Rhythm: Normal Sinus Rhythm    GI: Abdomen soft, Non-tender, Non-distended.    Gastrostomy / Jejunostomy tube in place.  Nasogastric tube in place.  Colostomy / Ileostomy.    Wound:    Extremities: Extremities warm, pink, well-perfused. Pulses:Rt     Lt    Derm: Good skin turgor, no skin breakdown.      :       Deutsch catheter in place.      CXR: < from: Xray Chest 1 View- PORTABLE-Routine (04.03.20 @ 03:10) >  Impression:      Bilateral airspace opacifications are without difference. Support devices as described.        < end of copied text >      LABS:  CAPILLARY BLOOD GLUCOSE      POCT Blood Glucose.: 173 mg/dL (03 Apr 2020 11:54)  POCT Blood Glucose.: 240 mg/dL (03 Apr 2020 09:40)  POCT Blood Glucose.: 201 mg/dL (03 Apr 2020 08:06)  POCT Blood Glucose.: 218 mg/dL (03 Apr 2020 06:03)  POCT Blood Glucose.: 206 mg/dL (03 Apr 2020 03:29)  POCT Blood Glucose.: 182 mg/dL (03 Apr 2020 01:28)  POCT Blood Glucose.: 186 mg/dL (02 Apr 2020 23:02)  POCT Blood Glucose.: 197 mg/dL (02 Apr 2020 20:47)  POCT Blood Glucose.: 230 mg/dL (02 Apr 2020 19:02)  POCT Blood Glucose.: 199 mg/dL (02 Apr 2020 17:28)  POCT Blood Glucose.: 126 mg/dL (02 Apr 2020 15:02)  POCT Blood Glucose.: 97 mg/dL (02 Apr 2020 13:29)                          9.3    17.04 )-----------( 358      ( 03 Apr 2020 01:50 )             27.9       04-03    141  |  106  |  34<H>  ----------------------------<  212<H>  4.8   |  22  |  1.3    Ca    8.7      03 Apr 2020 01:50  Phos  3.8     04-03  Mg     2.2     04-03    TPro  4.7<L>  /  Alb  2.2<L>  /  TBili  0.3  /  DBili  x   /  AST  18  /  ALT  25  /  AlkPhos  127<H>  04-03      PT/INR - ( 01 Apr 2020 23:30 )   PT: 14.20 sec;   INR: 1.23 ratio         PTT - ( 01 Apr 2020 23:30 )  PTT:41.4 sec  CARDIAC MARKERS ( 01 Apr 2020 23:30 )  x     / 0.08 ng/mL / 29 U/L / x     / x

## 2020-04-03 NOTE — PROGRESS NOTE ADULT - ASSESSMENT
ASSESSMENT  76 yo female with medical hx of HTN, DM II, Hypothyroidism HLD BIBA for evaluation of syncope, Pt also endorses Cough generalized weakness and fever of 101F  for 3-4 days duration, fall at home, PEA with ROSC after son gave CPR    IMPRESSION  # COVID-19 PNA, intubated 3/30, septic shock required pressors    Procalcitonin, Serum: 3.66 (04-01-20 @ 23:30)<--Procalcitonin, Serum: 1.25 (03-31-20 @ 17:51)    CXR b/l interstitial opacities   #Cytokine Release Syndrome   D-Dimer Assay, Quantitative: 1095 ng/mL DDU (04-01-20 @ 23:30)  Ferritin, Serum: 970 ng/mL (04-01-20 @ 23:30)  C-Reactive Protein, Serum: 40.81 mg/dL (04-01-20 @ 23:30)  #QTC Calculation(Bezet) 467 ms  #DM Hemoglobin A1C, Whole Blood: 9.9 (03-31-20 @ 05:30)    RECOMMENDATIONS  - rising WBC on steroids  - Check MRSA Nasal PCR  - Cefepime 2g q12h IV  - complete 5 days of azithro/plaquenil  - Monitor QTC   - Supportive care   - Trend CRP , procal     Spectra [id number]

## 2020-04-04 NOTE — PROGRESS NOTE ADULT - ASSESSMENT
NEURO:  Sedation w/ versed, morphine- no chnages  Titrate to RASS of -2 to -3    RESP:      Acute respiratory failure      Intubated with a 7.5 tube on 3/30  on /25/50/12.5  ABG 7.37 42 120 24      Daily CXR      Solumedrol 60q12      Ramonutssin DM     CARDS:      CAD s/p cardiac stent- continue ASA, not on B-blockers at home      Levo, MAP >75       Serial cardiac enzymes .04 > .03> 0.08>0.06      Lisinopril at home, discontinued         GI-   TF;Glucerna 50 ml/hr         LR @50/hr       PPI w Protonix and Senna for bowel regimen- adding lactulose for constipation    Renal      Preserved renal function      UO 40cc/hr      Gentle hydration with LR @ 50 ml/hr      Twice daily electrolytes        Heme-      Hgb 9.5 > 8.9      On HSQ      SCD     ID-      Afebrile      WBC 15 > 13      COVID -19 + with respiratory failure      Procalcitonin 1.25      Commpleted courses of Azithromycin and Plaquenil on 4/3,      Continue Cefepime q12 as per ID    Endocrine     IDDM on lantus at home     On insulin gtt     H/O Hypothyroidism not on medications-  TSH 2.2     On solumedrol    Dispo_ continue ICU level of care

## 2020-04-04 NOTE — PROGRESS NOTE ADULT - SUBJECTIVE AND OBJECTIVE BOX
TRICIA SANTIZO  75y, Female  Allergy: No Known Allergies      CHIEF COMPLAINT: Syncope, Pneumonia  R/O COVID (04 Apr 2020 00:45)      INTERVAL EVENTS/HPI  - No acute events overnight  - T(F): , Max: 99.3 (04-03-20 @ 20:00)  - Denies any worsening symptoms  - Tolerating medication  - WBC Count: 12.93 K/uL (04-04-20 @ 00:00)      ROS  unable to obtain history secondary to patient's mental status and/or sedation     FH non-contributory   Social Hx non-contributory    VITALS:  T(F): 96.8, Max: 99.3 (04-03-20 @ 20:00)  HR: 68  BP: 105/56  RR: 25Vital Signs Last 24 Hrs  T(C): 36 (04 Apr 2020 04:00), Max: 37.4 (03 Apr 2020 20:00)  T(F): 96.8 (04 Apr 2020 04:00), Max: 99.3 (03 Apr 2020 20:00)  HR: 68 (04 Apr 2020 04:00) (63 - 71)  BP: 105/56 (04 Apr 2020 04:00) (105/56 - 145/71)  BP(mean): 75 (04 Apr 2020 04:00) (75 - 98)  RR: 25 (04 Apr 2020 04:00) (23 - 25)  SpO2: 99% (04 Apr 2020 04:00) (99% - 100%)    PHYSICAL EXAM:  see below       TESTS & MEASUREMENTS:                        8.9    12.93 )-----------( 339      ( 04 Apr 2020 00:00 )             26.5     04-04    139  |  106  |  39<H>  ----------------------------<  186<H>  4.7   |  21  |  1.2    Ca    8.2<L>      04 Apr 2020 00:00  Phos  3.7     04-04  Mg     2.2     04-04    TPro  4.7<L>  /  Alb  2.1<L>  /  TBili  0.2  /  DBili  x   /  AST  12  /  ALT  21  /  AlkPhos  124<H>  04-04    eGFR if Non African American: 45 mL/min/1.73M2 (04-04-20 @ 00:00)  eGFR if : 52 mL/min/1.73M2 (04-04-20 @ 00:00)  eGFR if Non African American: 56 mL/min/1.73M2 (04-03-20 @ 15:47)  eGFR if : 65 mL/min/1.73M2 (04-03-20 @ 15:47)    LIVER FUNCTIONS - ( 04 Apr 2020 00:00 )  Alb: 2.1 g/dL / Pro: 4.7 g/dL / ALK PHOS: 124 U/L / ALT: 21 U/L / AST: 12 U/L / GGT: x                     INFECTIOUS DISEASES TESTING  MRSA PCR Result.: Negative (04-03-20 @ 13:20)      RADIOLOGY & ADDITIONAL TESTS:  I have personally reviewed the last Chest xray  CXR      CT      CARDIOLOGY TESTING  12 Lead ECG:   Systolic  mmHg    Diastolic BP 48 mmHg    Ventricular Rate 56 BPM    Atrial Rate 68 BPM    QRS Duration 84 ms    Q-T Interval 438 ms    QTC Calculation(Bezet) 422 ms    R Axis 75 degrees    T Axis 77 degrees    Diagnosis Line Sinus rhythm with PVCs  Low voltage QRS  Septal infarct , age undetermined  Abnormal ECG    Confirmed by RAMYA SILVA MD ([id number]) on 4/3/2020 8:13:14 AM (04-03-20 @ 08:06)  12 Lead ECG:   Systolic  mmHg    Diastolic BP 61 mmHg    Ventricular Rate 82 BPM    Atrial Rate 82 BPM    P-R Interval 136 ms    QRS Duration 80 ms    Q-T Interval 412 ms    QTC Calculation(Bezet) 481 ms    P Axis 43 degrees    R Axis 81 degrees    T Axis 84 degrees    Diagnosis Line Normal sinus rhythm  Low voltage QRS  Septal infarct , age undetermined  Abnormal ECG    Confirmed by Lg Linton ([id number]) on 4/2/2020 7:09:36 AM (04-02-20 @ 02:46)      MEDICATIONS  ascorbic acid 1000  atorvastatin 40  cefepime   IVPB 2000  chlorhexidine 0.12% Liquid 15  clopidogrel Tablet 75  guaifenesin/dextromethorphan  Syrup 10  heparin  Injectable 5000  insulin regular Infusion 1  lactated ringers. 1000  methylPREDNISolone sodium succinate Injectable 60  midazolam Infusion 0.02  morphine  Infusion 2  norepinephrine Infusion 0.05  pantoprazole  Injectable 40  senna 2  zinc sulfate 220      ANTIBIOTICS:  cefepime   IVPB 2000 milliGRAM(s) IV Intermittent every 12 hours      All available historical data has been reviewed

## 2020-04-04 NOTE — PROGRESS NOTE ADULT - ASSESSMENT
A/P:  1.  Acute Hypoxic Respiratory Failure/  Septic Shock    somewhat improved, Fio2 lowered to 55%   2/2 Covid-19 positive    on HCQ protocol, Vit C 100bid, Zinc, (SRe=817ps)    2. Syncope - 2/2 dehydration 2/2 covid 19, no evidence of cardiac etiology    3.  CAD - stable    3.  DM II - on insulin drip     overall, slight improvement but certainly heading in right direction.

## 2020-04-04 NOTE — PROGRESS NOTE ADULT - SUBJECTIVE AND OBJECTIVE BOX
TRICIA SANTIZO  [id number]  75y Female    Indication for ICU admission:  acute respiratory failure COVID-19 positive, pneumonia, HD instability  Admit Date: 3/27  ICU Date:  3/31    No Known Allergies    PAST MEDICAL & SURGICAL HISTORY:  Hypothyroidism: Hypothyroidism  Essential hypertension: HTN (hypertension)  Arthropathy: Arthritis  Uncontrolled type 2 diabetes mellitus: Diabetes mellitus type II, uncontrolled  Hyperlipidemia: Hyperlipidemia  H/O heart artery stent    Home Medications:  glyBURIDE 5 mg oral tablet: 1 tab(s) orally once a day (28 Mar 2020 05:20)  lisinopril 20 mg oral tablet: 1 tab(s) orally once a day (09 May 2018 12:13)  metFORMIN 500 mg oral tablet, extended release: 2 tab(s) orally 2 times a day (28 Mar 2020 05:19)  Plavix 75 mg oral tablet: 1 tab(s) orally once a day (09 May 2018 12:13)  rosuvastatin 20 mg oral tablet: 1 tab(s) orally once a day (28 Mar 2020 05:19)        24HRS EVENT:  Overnight: No acute events.    NEURO:  Sedation w/ versed, morphine  Titrate to RASS of -2    RESP:      Acute respiratory failure      Intubated with a 7.5 tube on 3/30  on /25/50/15  ABG ____      Daily CXR      Solumedrol 60q12      Robutssin DM     CARDS:      CAD s/p cardiac stent- continue ASA, not on B-blockers at home      Levo, MAP >75       Serial cardiac enzymes .04 > .03> 0.08      Lisinopril at home, discontinued         GI-   TF;Glucerna 50 ml/hr         LR @50/hr       PPI w Protonix and Senna for bowel regimen    Renal      Preserved renal function      UO 40cc/hr      Gentle hydration with LR @ 50 ml/hr      BUN/Cr 39/1.2 (36/1.0)      Na 139/ K 4.7/ Mg 2.2/ Phos 3.7        Heme-      Hgb 9.5 > 8.9      On HSQ      SCD     ID-      Afebrile      WBC 15 > 13      COVID -19 + with respiratory failure      Procalcitonin 1.25      Azithromycin and Plaquenil both end 4/3, continue Cefepime q12 as per ID    Endocrine     IDDM on lantus at home     On insulin gtt     H/O Hypothyroidism not on medications-  TSH 2.2    On solumedrol    Lines:   L IJ TLC, R radial A line, ELVIA DeutschT

## 2020-04-04 NOTE — PROGRESS NOTE ADULT - ASSESSMENT
74 yo female with medical hx of HTN, DM II, Hypothyroidism HLD BIBA for evaluation of syncope, Pt also endorses Cough generalized weakness and fever of 101F  for 3-4 days duration, fall at home, PEA with ROSC after son gave CPR    IMPRESSION  # COVID-19 PNA, intubated 3/30, septic shock required pressors    Procalcitonin, Serum: 3.66 (04-01-20 @ 23:30)<--Procalcitonin, Serum: 1.25 (03-31-20 @ 17:51)    CXR b/l interstitial opacities   #Cytokine Release Syndrome   D-Dimer Assay, Quantitative: 1095 ng/mL DDU (04-01-20 @ 23:30)  Ferritin, Serum: 970 ng/mL (04-01-20 @ 23:30)  C-Reactive Protein, Serum: 40.81 mg/dL (04-01-20 @ 23:30)  #QTC Calculation(Bezet) 467 ms  #DM Hemoglobin A1C, Whole Blood: 9.9 (03-31-20 @ 05:30)  - completed 5 days of azithro/plaquenil   MRSA Nasal PCR negative    RECOMMENDATIONS  - rising WBC on steroids  - Cefepime 2g q12h IV  - Supportive care   - Trend CRP , procal

## 2020-04-04 NOTE — PROGRESS NOTE ADULT - SUBJECTIVE AND OBJECTIVE BOX
Patient is a 75y old  Female who presents with a chief complaint of Syncope, Pneumonia  R/O COVID (03 Apr 2020 12:13)      HPI:  Patient is a 74 yo female with medical hx of HTN, DM II, Hypothyroidism HLD BIBA for evaluation of syncope, Pt also endorses Cough generalized weakness and fever of 101F  for 3-4 days duration. Patient states she fell at home while trying to get out of bed, she remembered feeling weak and nauseous  right before the fall. Pt also states that her Son gave her CPR as he couldn't feel her pulse after she collapsed, CPR lasted for about one minute before achieving ROSC. No HT, No AC use, + LOC and AP use    Patient  is also sick at home with similar symptoms, she denied known covid exposure and recent travel. Denies Pain, headache CP, palpitation, Abdominal pain,  Diarrhea , numbness, tingling, urinary symptoms    Vitals in ED remarkable for elevated /90  CXR: B/L Peripheral and Interstitial opacities (28 Mar 2020 03:24)      PAST MEDICAL & SURGICAL HISTORY:  Hypothyroidism: Hypothyroidism  Essential hypertension: HTN (hypertension)  Arthropathy: Arthritis  Uncontrolled type 2 diabetes mellitus: Diabetes mellitus type II, uncontrolled  Hyperlipidemia: Hyperlipidemia  H/O heart artery stent      PREVIOUS DIAGNOSTIC TESTING:      ECHO  FINDINGS:    STRESS  FINDINGS:    CATHETERIZATION  FINDINGS:    MEDICATIONS  (STANDING):  ascorbic acid 1000 milliGRAM(s) Oral two times a day  atorvastatin 40 milliGRAM(s) Oral at bedtime  cefepime   IVPB 2000 milliGRAM(s) IV Intermittent every 12 hours  chlorhexidine 0.12% Liquid 15 milliLiter(s) Oral Mucosa two times a day  clopidogrel Tablet 75 milliGRAM(s) Oral daily  guaifenesin/dextromethorphan  Syrup 10 milliLiter(s) Oral every 8 hours  heparin  Injectable 5000 Unit(s) SubCutaneous every 8 hours  insulin regular Infusion 1 Unit(s)/Hr (1 mL/Hr) IV Continuous <Continuous>  lactated ringers. 1000 milliLiter(s) (50 mL/Hr) IV Continuous <Continuous>  methylPREDNISolone sodium succinate Injectable 60 milliGRAM(s) IV Push every 12 hours  midazolam Infusion 0.02 mG/kG/Hr (1.36 mL/Hr) IV Continuous <Continuous>  morphine  Infusion 2 mG/Hr (2 mL/Hr) IV Continuous <Continuous>  norepinephrine Infusion 0.05 MICROgram(s)/kG/Min (3.19 mL/Hr) IV Continuous <Continuous>  pantoprazole  Injectable 40 milliGRAM(s) IV Push daily  senna 2 Tablet(s) Oral at bedtime  zinc sulfate 220 milliGRAM(s) Oral daily    MEDICATIONS  (PRN):  acetaminophen   Tablet .. 650 milliGRAM(s) Oral every 6 hours PRN Temp greater or equal to 38C (100.4F)  acetaminophen   Tablet .. 650 milliGRAM(s) Oral every 6 hours PRN Temp greater or equal to 38C (100.4F)      FAMILY HISTORY:      SOCIAL HISTORY:  CIGARETTES:    ALCOHOL:    REVIEW OF SYSTEMS:  CONSTITUTIONAL: No fever, weight loss, or fatigue  NECK: No pain or stiffness  RESPIRATORY: No cough, wheezing, chills or hemoptysis; No shortness of breath  CARDIOVASCULAR: No chest pain, palpitations, dizziness, or leg swelling  GASTROINTESTINAL: No abdominal or epigastric pain. No nausea, vomiting, or hematemesis; No diarrhea or constipation. No melena or hematochezia.  GENITOURINARY: No dysuria, frequency, hematuria, or incontinence  NEUROLOGICAL: No headaches, memory loss, loss of strength, numbness, or tremors  SKIN: No itching, burning, rashes, or lesions   ENDOCRINE: No heat or cold intolerance; No hair loss  MUSCULOSKELETAL: No joint pain or swelling; No muscle, back, or extremity pain  HEME/LYMPH: No easy bruising, or bleeding gums          Vital Signs Last 24 Hrs  T(C): 36.6 (03 Apr 2020 16:00), Max: 36.7 (03 Apr 2020 08:00)  T(F): 97.9 (03 Apr 2020 16:00), Max: 98.1 (03 Apr 2020 08:00)  HR: 68 (03 Apr 2020 16:00) (63 - 75)  BP: --  BP(mean): --  RR: 23 (03 Apr 2020 16:00) (23 - 25)  SpO2: 99% (03 Apr 2020 16:00) (99% - 100%)        PHYSICAL EXAM:  GENERAL: remains intubated  CHEST/LUNG: bibasilar rales  HEART: Regular rate and rhythm; No murmurs, rubs, or gallops  ABDOMEN: Soft, Nontender, Nondistended; Bowel sounds present  EXTREMITIES:  2+ Peripheral Pulses, No clubbing, cyanosis, or edema  SKIN: No rashes or lesions    INTERPRETATION OF TELEMETRY:    ECG:    I&O's Detail    02 Apr 2020 07:01  -  03 Apr 2020 07:00  --------------------------------------------------------  IN:    Glucerna: 320 mL    insulin regular Infusion: 119 mL    IV PiggyBack: 54 mL    Lactated Ringers IV Bolus: 500 mL    lactated ringers.: 2530 mL    midazolam Infusion: 81.1 mL    morphine  Infusion: 46 mL    norepinephrine Infusion: 149.6 mL  Total IN: 3799.7 mL    OUT:    Indwelling Catheter - Urethral: 798 mL  Total OUT: 798 mL    Total NET: 3001.7 mL      03 Apr 2020 07:01  -  04 Apr 2020 00:46  --------------------------------------------------------  IN:    Glucerna: 510 mL    insulin regular Infusion: 58 mL    lactated ringers.: 220 mL    lactated ringers.: 500 mL    midazolam Infusion: 42 mL    morphine  Infusion: 24 mL    norepinephrine Infusion: 61.2 mL  Total IN: 1415.2 mL    OUT:    Indwelling Catheter - Urethral: 630 mL  Total OUT: 630 mL    Total NET: 785.2 mL          LABS:                        9.5    15.59 )-----------( 369      ( 03 Apr 2020 15:47 )             28.0     04-03    141  |  107  |  36<H>  ----------------------------<  151<H>  4.4   |  23  |  1.0    Ca    8.6      03 Apr 2020 15:47  Phos  3.4     04-03  Mg     2.3     04-03    TPro  4.8<L>  /  Alb  2.2<L>  /  TBili  0.2  /  DBili  x   /  AST  13  /  ALT  22  /  AlkPhos  125<H>  04-03            I&O's Summary    02 Apr 2020 07:01  -  03 Apr 2020 07:00  --------------------------------------------------------  IN: 3799.7 mL / OUT: 798 mL / NET: 3001.7 mL    03 Apr 2020 07:01  -  04 Apr 2020 00:46  --------------------------------------------------------  IN: 1415.2 mL / OUT: 630 mL / NET: 785.2 mL        RADIOLOGY & ADDITIONAL STUDIES:

## 2020-04-05 NOTE — PROGRESS NOTE ADULT - SUBJECTIVE AND OBJECTIVE BOX
Patient is a 75y old  Female who presents with a chief complaint of Syncope, Pneumonia  R/O COVID (04 Apr 2020 11:58)      HPI:  Patient is a 74 yo female with medical hx of HTN, DM II, Hypothyroidism HLD BIBA for evaluation of syncope, Pt also endorses Cough generalized weakness and fever of 101F  for 3-4 days duration. Patient states she fell at home while trying to get out of bed, she remembered feeling weak and nauseous  right before the fall. Pt also states that her Son gave her CPR as he couldn't feel her pulse after she collapsed, CPR lasted for about one minute before achieving ROSC. No HT, No AC use, + LOC and AP use    Patient  is also sick at home with similar symptoms, she denied known covid exposure and recent travel. Denies Pain, headache CP, palpitation, Abdominal pain,  Diarrhea , numbness, tingling, urinary symptoms    Vitals in ED remarkable for elevated /90  CXR: B/L Peripheral and Interstitial opacities (28 Mar 2020 03:24)      PAST MEDICAL & SURGICAL HISTORY:  Hypothyroidism: Hypothyroidism  Essential hypertension: HTN (hypertension)  Arthropathy: Arthritis  Uncontrolled type 2 diabetes mellitus: Diabetes mellitus type II, uncontrolled  Hyperlipidemia: Hyperlipidemia  H/O heart artery stent      PREVIOUS DIAGNOSTIC TESTING:      ECHO  FINDINGS:    STRESS  FINDINGS:    CATHETERIZATION  FINDINGS:    MEDICATIONS  (STANDING):  ascorbic acid 1000 milliGRAM(s) Oral two times a day  atorvastatin 40 milliGRAM(s) Oral at bedtime  cefepime   IVPB 2000 milliGRAM(s) IV Intermittent every 12 hours  chlorhexidine 0.12% Liquid 15 milliLiter(s) Oral Mucosa two times a day  chlorhexidine 4% Liquid 1 Application(s) Topical daily  clopidogrel Tablet 75 milliGRAM(s) Oral daily  guaifenesin/dextromethorphan  Syrup 10 milliLiter(s) Oral every 8 hours  heparin  Injectable 5000 Unit(s) SubCutaneous every 8 hours  insulin regular Infusion 1 Unit(s)/Hr (1 mL/Hr) IV Continuous <Continuous>  lactated ringers. 1000 milliLiter(s) (50 mL/Hr) IV Continuous <Continuous>  lactulose Syrup 20 Gram(s) Enteral Tube every 12 hours  methylPREDNISolone sodium succinate Injectable 60 milliGRAM(s) IV Push every 12 hours  midazolam Infusion 0.02 mG/kG/Hr (1.36 mL/Hr) IV Continuous <Continuous>  morphine  Infusion 2 mG/Hr (2 mL/Hr) IV Continuous <Continuous>  norepinephrine Infusion 0.05 MICROgram(s)/kG/Min (3.19 mL/Hr) IV Continuous <Continuous>  pantoprazole  Injectable 40 milliGRAM(s) IV Push daily  senna 2 Tablet(s) Oral at bedtime  zinc sulfate 220 milliGRAM(s) Oral daily    MEDICATIONS  (PRN):  acetaminophen   Tablet .. 650 milliGRAM(s) Oral every 6 hours PRN Temp greater or equal to 38C (100.4F)  acetaminophen   Tablet .. 650 milliGRAM(s) Oral every 6 hours PRN Temp greater or equal to 38C (100.4F)      FAMILY HISTORY:      SOCIAL HISTORY:  CIGARETTES:    ALCOHOL:    REVIEW OF SYSTEMS:  CONSTITUTIONAL: No fever, weight loss, or fatigue  NECK: No pain or stiffness  RESPIRATORY: No cough, wheezing, chills or hemoptysis; No shortness of breath  CARDIOVASCULAR: No chest pain, palpitations, dizziness, or leg swelling  GASTROINTESTINAL: No abdominal or epigastric pain. No nausea, vomiting, or hematemesis; No diarrhea or constipation. No melena or hematochezia.  GENITOURINARY: No dysuria, frequency, hematuria, or incontinence  NEUROLOGICAL: No headaches, memory loss, loss of strength, numbness, or tremors  SKIN: No itching, burning, rashes, or lesions   ENDOCRINE: No heat or cold intolerance; No hair loss  MUSCULOSKELETAL: No joint pain or swelling; No muscle, back, or extremity pain  HEME/LYMPH: No easy bruising, or bleeding gums          Vital Signs Last 24 Hrs  T(C): 36 (04 Apr 2020 20:00), Max: 36.4 (04 Apr 2020 16:00)  T(F): 96.8 (04 Apr 2020 20:00), Max: 97.6 (04 Apr 2020 16:00)  HR: 69 (04 Apr 2020 16:00) (63 - 70)  BP: 123/58 (04 Apr 2020 12:00) (105/56 - 142/70)  BP(mean): 75 (04 Apr 2020 04:00) (75 - 75)  RR: 22 (04 Apr 2020 16:00) (22 - 25)  SpO2: 99% (04 Apr 2020 16:00) (99% - 99%)        PHYSICAL EXAM:  GENERAL: remains intubated  CHEST/LUNG: b/l rhonchi  HEART: Regular rate and rhythm; No murmurs, rubs, or gallops  ABDOMEN: Soft, Nontender, Nondistended; Bowel sounds present  EXTREMITIES:  2+ Peripheral Pulses, No clubbing, cyanosis, or edema  SKIN: No rashes or lesions    INTERPRETATION OF TELEMETRY:    ECG:    I&O's Detail    03 Apr 2020 07:01  -  04 Apr 2020 07:00  --------------------------------------------------------  IN:    Glucerna: 910 mL    insulin regular Infusion: 96 mL    lactated ringers.: 220 mL    lactated ringers.: 1100 mL    midazolam Infusion: 83 mL    morphine  Infusion: 48 mL    norepinephrine Infusion: 107.6 mL  Total IN: 2564.6 mL    OUT:    Indwelling Catheter - Urethral: 1110 mL  Total OUT: 1110 mL    Total NET: 1454.6 mL      04 Apr 2020 07:01  -  05 Apr 2020 00:03  --------------------------------------------------------  IN:    Enteral Tube Flush: 190 mL    Glucerna: 850 mL    insulin regular Infusion: 91 mL    lactated ringers.: 850 mL    midazolam Infusion: 39.3 mL    morphine  Infusion: 34 mL    norepinephrine Infusion: 29.8 mL  Total IN: 2084.1 mL    OUT:    Indwelling Catheter - Urethral: 720 mL  Total OUT: 720 mL    Total NET: 1364.1 mL          LABS:                        9.2    11.26 )-----------( 336      ( 04 Apr 2020 17:10 )             27.7     04-04    143  |  108  |  44<H>  ----------------------------<  144<H>  4.1   |  22  |  1.0    Ca    8.3<L>      04 Apr 2020 17:10  Phos  3.3     04-04  Mg     2.2     04-04    TPro  4.9<L>  /  Alb  2.3<L>  /  TBili  0.2  /  DBili  x   /  AST  11  /  ALT  20  /  AlkPhos  110  04-04            I&O's Summary    03 Apr 2020 07:01  -  04 Apr 2020 07:00  --------------------------------------------------------  IN: 2564.6 mL / OUT: 1110 mL / NET: 1454.6 mL    04 Apr 2020 07:01  -  05 Apr 2020 00:03  --------------------------------------------------------  IN: 2084.1 mL / OUT: 720 mL / NET: 1364.1 mL        RADIOLOGY & ADDITIONAL STUDIES:

## 2020-04-05 NOTE — PROGRESS NOTE ADULT - SUBJECTIVE AND OBJECTIVE BOX
Patient is a 75y old  Female who presents with a chief complaint of Syncope, Pneumonia  R/O COVID (05 Apr 2020 06:53)      HPI:  Patient is a 74 yo female with medical hx of HTN, DM II, Hypothyroidism HLD BIBA for evaluation of syncope, Pt also endorses Cough generalized weakness and fever of 101F  for 3-4 days duration. Patient states she fell at home while trying to get out of bed, she remembered feeling weak and nauseous  right before the fall. Pt also states that her Son gave her CPR as he couldn't feel her pulse after she collapsed, CPR lasted for about one minute before achieving ROSC. No HT, No AC use, + LOC and AP use    Patient  is also sick at home with similar symptoms, she denied known covid exposure and recent travel. Denies Pain, headache CP, palpitation, Abdominal pain,  Diarrhea , numbness, tingling, urinary symptoms    Vitals in ED remarkable for elevated /90  CXR: B/L Peripheral and Interstitial opacities (28 Mar 2020 03:24)      PAST MEDICAL & SURGICAL HISTORY:  Hypothyroidism: Hypothyroidism  Essential hypertension: HTN (hypertension)  Arthropathy: Arthritis  Uncontrolled type 2 diabetes mellitus: Diabetes mellitus type II, uncontrolled  Hyperlipidemia: Hyperlipidemia  H/O heart artery stent      PREVIOUS DIAGNOSTIC TESTING:      ECHO  FINDINGS:    STRESS  FINDINGS:    CATHETERIZATION  FINDINGS:    MEDICATIONS  (STANDING):  ascorbic acid 1000 milliGRAM(s) Oral two times a day  atorvastatin 40 milliGRAM(s) Oral at bedtime  cefepime   IVPB 2000 milliGRAM(s) IV Intermittent every 12 hours  chlorhexidine 0.12% Liquid 15 milliLiter(s) Oral Mucosa two times a day  chlorhexidine 4% Liquid 1 Application(s) Topical daily  clopidogrel Tablet 75 milliGRAM(s) Oral daily  guaifenesin/dextromethorphan  Syrup 10 milliLiter(s) Oral every 8 hours  heparin  Injectable 5000 Unit(s) SubCutaneous every 8 hours  insulin regular Infusion 1 Unit(s)/Hr (1 mL/Hr) IV Continuous <Continuous>  lactated ringers. 1000 milliLiter(s) (50 mL/Hr) IV Continuous <Continuous>  lactulose Syrup 20 Gram(s) Enteral Tube every 12 hours  methylPREDNISolone sodium succinate Injectable 60 milliGRAM(s) IV Push every 12 hours  metoprolol tartrate 12.5 milliGRAM(s) Oral two times a day  midazolam Infusion 0.02 mG/kG/Hr (1.36 mL/Hr) IV Continuous <Continuous>  morphine  Infusion 2 mG/Hr (2 mL/Hr) IV Continuous <Continuous>  norepinephrine Infusion 0.05 MICROgram(s)/kG/Min (3.19 mL/Hr) IV Continuous <Continuous>  pantoprazole  Injectable 40 milliGRAM(s) IV Push daily  senna 2 Tablet(s) Oral at bedtime  zinc sulfate 220 milliGRAM(s) Oral daily    MEDICATIONS  (PRN):  acetaminophen   Tablet .. 650 milliGRAM(s) Oral every 6 hours PRN Temp greater or equal to 38C (100.4F)  acetaminophen   Tablet .. 650 milliGRAM(s) Oral every 6 hours PRN Temp greater or equal to 38C (100.4F)      FAMILY HISTORY:      SOCIAL HISTORY:  CIGARETTES:    ALCOHOL:    REVIEW OF SYSTEMS:  CONSTITUTIONAL: No fever, weight loss, or fatigue  NECK: No pain or stiffness  RESPIRATORY: No cough, wheezing, chills or hemoptysis; No shortness of breath  CARDIOVASCULAR: No chest pain, palpitations, dizziness, or leg swelling  GASTROINTESTINAL: No abdominal or epigastric pain. No nausea, vomiting, or hematemesis; No diarrhea or constipation. No melena or hematochezia.  GENITOURINARY: No dysuria, frequency, hematuria, or incontinence  NEUROLOGICAL: No headaches, memory loss, loss of strength, numbness, or tremors  SKIN: No itching, burning, rashes, or lesions   ENDOCRINE: No heat or cold intolerance; No hair loss  MUSCULOSKELETAL: No joint pain or swelling; No muscle, back, or extremity pain  HEME/LYMPH: No easy bruising, or bleeding gums          Vital Signs Last 24 Hrs  T(C): 35.9 (05 Apr 2020 20:00), Max: 36.4 (05 Apr 2020 16:00)  T(F): 96.7 (05 Apr 2020 20:00), Max: 97.6 (05 Apr 2020 16:00)  HR: 115 (05 Apr 2020 22:08) (69 - 148)  BP: 147/70 (05 Apr 2020 08:00) (123/60 - 147/70)  BP(mean): 86 (05 Apr 2020 00:00) (86 - 86)  RR: 25 (05 Apr 2020 20:00) (18 - 25)  SpO2: 99% (05 Apr 2020 22:08) (80% - 100%)        PHYSICAL EXAM:  GENERAL: remains intubated  CHEST/LUNG: Clear to percussion bilaterally; No rales, rhonchi, wheezing, or rubs  HEART: irRegular rate and rhythm; No murmurs, rubs, or gallops  ABDOMEN: Soft, Nontender, Nondistended; Bowel sounds present  EXTREMITIES:  2+ Peripheral Pulses, No clubbing, cyanosis, or edema  SKIN: No rashes or lesions    INTERPRETATION OF TELEMETRY:    ECG: A-Fib c RVR    I&O's Detail    04 Apr 2020 07:01  -  05 Apr 2020 07:00  --------------------------------------------------------  IN:    Enteral Tube Flush: 290 mL    Glucerna: 1200 mL    insulin regular Infusion: 164 mL    IV PiggyBack: 50 mL    lactated ringers.: 1200 mL    midazolam Infusion: 53.3 mL    morphine  Infusion: 48 mL    norepinephrine Infusion: 46.8 mL  Total IN: 3052.1 mL    OUT:    Indwelling Catheter - Urethral: 1000 mL  Total OUT: 1000 mL    Total NET: 2052.1 mL      05 Apr 2020 07:01  -  05 Apr 2020 23:49  --------------------------------------------------------  IN:    Enteral Tube Flush: 180 mL    Glucerna: 800 mL    insulin regular Infusion: 121 mL    IV PiggyBack: 300 mL    lactated ringers.: 800 mL    midazolam Infusion: 56 mL    morphine  Infusion: 28 mL    norepinephrine Infusion: 93 mL  Total IN: 2378 mL    OUT:    Indwelling Catheter - Urethral: 2120 mL  Total OUT: 2120 mL    Total NET: 258 mL          LABS:                        11.6   23.59 )-----------( 484      ( 05 Apr 2020 11:08 )             35.6     04-05    141  |  107  |  48<H>  ----------------------------<  186<H>  4.4   |  20  |  1.1    Ca    9.0      05 Apr 2020 11:08  Phos  3.6     04-05  Mg     2.3     04-05    TPro  4.8<L>  /  Alb  2.1<L>  /  TBili  0.2  /  DBili  x   /  AST  12  /  ALT  20  /  AlkPhos  112  04-05            I&O's Summary    04 Apr 2020 07:01  -  05 Apr 2020 07:00  --------------------------------------------------------  IN: 3052.1 mL / OUT: 1000 mL / NET: 2052.1 mL    05 Apr 2020 07:01  -  05 Apr 2020 23:49  --------------------------------------------------------  IN: 2378 mL / OUT: 2120 mL / NET: 258 mL        RADIOLOGY & ADDITIONAL STUDIES:

## 2020-04-05 NOTE — CHART NOTE - NSCHARTNOTEFT_GEN_A_CORE
Spoke with the daughter Sagrario , informed her about the fact she was in Afib today and we gave her beta blockers

## 2020-04-05 NOTE — PROGRESS NOTE ADULT - SUBJECTIVE AND OBJECTIVE BOX
TRICIA SANTIZO  [id number]  75y Female    Indication for ICU admission:  acute respiratory failure COVID-19 positive, pneumonia, HD instability  Admit Date: 3/27  ICU Date:  3/31    No Known Allergies    PAST MEDICAL & SURGICAL HISTORY:  Hypothyroidism: Hypothyroidism  Essential hypertension: HTN (hypertension)  Arthropathy: Arthritis  Uncontrolled type 2 diabetes mellitus: Diabetes mellitus type II, uncontrolled  Hyperlipidemia: Hyperlipidemia  H/O heart artery stent    Home Medications:  glyBURIDE 5 mg oral tablet: 1 tab(s) orally once a day (28 Mar 2020 05:20)  lisinopril 20 mg oral tablet: 1 tab(s) orally once a day (09 May 2018 12:13)  metFORMIN 500 mg oral tablet, extended release: 2 tab(s) orally 2 times a day (28 Mar 2020 05:19)  Plavix 75 mg oral tablet: 1 tab(s) orally once a day (09 May 2018 12:13)  rosuvastatin 20 mg oral tablet: 1 tab(s) orally once a day (28 Mar 2020 05:19)        24HRS EVENT:    Overnight: Desatted down to 85%. Recrutiment of PEEP x1, minimal improvement. Increased FiO2 to 80%. Tachycardic after hypoxic episode. Increased Versed and gave 2mg push. HR 150s EKG showed Afib gave Lopressor 5x2.     NEURO:  Sedation w/ versed, morphine  Titrate to RASS of -2 to -3    RESP:      Acute respiratory failure      Intubated with a 7.5 tube on 3/30      /25/80/12.5      AM ABG 7.30/49/54/24      Daily CXR      Solumedrol 60q12      Ramonutskarl DM     CARDS:      CAD s/p cardiac stent- continue plavix, not on B-blockers at home      Levo, MAP >75       Serial cardiac enzymes .04 > .03> 0.08      Lisinopril at home, discontinued      Atorvastatin       QTc 442          GI-   TF;Glucerna 50 ml/hr         LR @50/hr       PPI w Protonix and Senna for bowel regimen- added lactulose until BM    Renal      BUN/Cr 46/1.1       UO 40cc/hr       Gentle hydration with LR @ 50 ml/hr              Heme-      H/H 9.1/27.6       On HSQ      SCD     ID-      Afebrile      WBC 10.3       Lymphocyte 0.35       COVID -19 + with respiratory failure      Procalcitonin 1.25      Azithromycin and Plaquenil both end 4/3      Cefepime q12 as per ID since 4/1 ? duration    Endocrine     IDDM on lantus at home     On insulin gtt     H/O Hypothyroidism not on medications-  TSH 2.2    On solumedrol    Lines:   L IJ TLC, R radial A line, Deutsch, OGT     Daily     Daily     Diet, NPO with Tube Feed:   Tube Feeding Modality: Nasogastric  Glucerna 1.2 Oscar  Total Volume for 24 Hours (mL): 1200  Continuous  Starting Tube Feed Rate mL per Hour: 30  Increase Tube Feed Rate by (mL): 10     Every 2 hours  Until Goal Tube Feed Rate (mL per Hour): 50  Tube Feed Duration (in Hours): 24  Tube Feed Start Time: 11:00 (04-03-20 @ 10:42)      CURRENT MEDS:  Neurologic Medications  acetaminophen   Tablet .. 650 milliGRAM(s) Oral every 6 hours PRN Temp greater or equal to 38C (100.4F)  acetaminophen   Tablet .. 650 milliGRAM(s) Oral every 6 hours PRN Temp greater or equal to 38C (100.4F)  midazolam Infusion 0.02 mG/kG/Hr IV Continuous <Continuous>  morphine  Infusion 2 mG/Hr IV Continuous <Continuous>    Respiratory Medications  guaifenesin/dextromethorphan  Syrup 10 milliLiter(s) Oral every 8 hours    Cardiovascular Medications  norepinephrine Infusion 0.05 MICROgram(s)/kG/Min IV Continuous <Continuous>    Gastrointestinal Medications  ascorbic acid 1000 milliGRAM(s) Oral two times a day  lactated ringers. 1000 milliLiter(s) IV Continuous <Continuous>  lactulose Syrup 20 Gram(s) Enteral Tube every 12 hours  pantoprazole  Injectable 40 milliGRAM(s) IV Push daily  senna 2 Tablet(s) Oral at bedtime  zinc sulfate 220 milliGRAM(s) Oral daily    Genitourinary Medications    Hematologic/Oncologic Medications  clopidogrel Tablet 75 milliGRAM(s) Oral daily  heparin  Injectable 5000 Unit(s) SubCutaneous every 8 hours    Antimicrobial/Immunologic Medications  cefepime   IVPB 2000 milliGRAM(s) IV Intermittent every 12 hours    Endocrine/Metabolic Medications  atorvastatin 40 milliGRAM(s) Oral at bedtime  insulin regular Infusion 1 Unit(s)/Hr IV Continuous <Continuous>  methylPREDNISolone sodium succinate Injectable 60 milliGRAM(s) IV Push every 12 hours    Topical/Other Medications  chlorhexidine 0.12% Liquid 15 milliLiter(s) Oral Mucosa two times a day  chlorhexidine 4% Liquid 1 Application(s) Topical daily      ICU Vital Signs Last 24 Hrs  T(C): 36.2 (05 Apr 2020 00:00), Max: 36.4 (04 Apr 2020 16:00)  T(F): 97.2 (05 Apr 2020 00:00), Max: 97.6 (04 Apr 2020 16:00)  HR: 71 (05 Apr 2020 03:38) (63 - 71)  BP: 123/60 (05 Apr 2020 00:00) (123/58 - 142/70)  BP(mean): 86 (05 Apr 2020 00:00) (86 - 86)  ABP: 155/58 (05 Apr 2020 00:00) (128/52 - 167/67)  ABP(mean): 86 (05 Apr 2020 00:00) (75 - 105)  RR: 25 (05 Apr 2020 00:00) (22 - 25)  SpO2: 92% (05 Apr 2020 03:38) (92% - 99%)      Adult Advanced Hemodynamics Last 24 Hrs  CVP(mm Hg): --  CVP(cm H2O): --  CO: --  CI: --  PA: --  PA(mean): --  PCWP: --  SVR: --  SVRI: --  PVR: --  PVRI: --    Mode: AC/ CMV (Assist Control/ Continuous Mandatory Ventilation)  RR (machine): 25  TV (machine): 400  FiO2: 80  PEEP: 12.5  ITime: 1  MAP: 21  PIP: 35    ABG - ( 05 Apr 2020 05:21 )  pH, Arterial: 7.30  pH, Blood: x     /  pCO2: 49    /  pO2: 54    / HCO3: 24    / Base Excess: -2.7  /  SaO2: 84                  I&O's Summary    03 Apr 2020 07:01  -  04 Apr 2020 07:00  --------------------------------------------------------  IN: 2564.6 mL / OUT: 1110 mL / NET: 1454.6 mL    04 Apr 2020 07:01  -  05 Apr 2020 06:53  --------------------------------------------------------  IN: 2542.1 mL / OUT: 870 mL / NET: 1672.1 mL      I&O's Detail    03 Apr 2020 07:01  -  04 Apr 2020 07:00  --------------------------------------------------------  IN:    Glucerna: 910 mL    insulin regular Infusion: 96 mL    lactated ringers.: 220 mL    lactated ringers.: 1100 mL    midazolam Infusion: 83 mL    morphine  Infusion: 48 mL    norepinephrine Infusion: 107.6 mL  Total IN: 2564.6 mL    OUT:    Indwelling Catheter - Urethral: 1110 mL  Total OUT: 1110 mL    Total NET: 1454.6 mL      04 Apr 2020 07:01  -  05 Apr 2020 06:53  --------------------------------------------------------  IN:    Enteral Tube Flush: 190 mL    Glucerna: 1050 mL    insulin regular Infusion: 131 mL    lactated ringers.: 1050 mL    midazolam Infusion: 47.3 mL    morphine  Infusion: 42 mL    norepinephrine Infusion: 31.8 mL  Total IN: 2542.1 mL    OUT:    Indwelling Catheter - Urethral: 870 mL  Total OUT: 870 mL    Total NET: 1672.1 mL          PHYSICAL EXAM:    General/Neuro  RASS:             GCS:     = E   / V   / M      Deficits:                             alert & oriented x 3, no focal deficits  Pupils:    Lungs:      clear to auscultation, Normal expansion/effort.     Cardiovascular : S1, S2.  Regular rate and rhythm.  Peripheral edema   Cardiac Rhythm: Normal Sinus Rhythm    GI: Abdomen soft, Non-tender, Non-distended.    Gastrostomy / Jejunostomy tube in place.  Nasogastric tube in place.  Colostomy / Ileostomy.    Wound:    Extremities: Extremities warm, pink, well-perfused. Pulses:Rt     Lt    Derm: Good skin turgor, no skin breakdown.      :       Deutsch catheter in place.      CXR:     LABS:  CAPILLARY BLOOD GLUCOSE      POCT Blood Glucose.: 161 mg/dL (05 Apr 2020 06:40)  POCT Blood Glucose.: 211 mg/dL (05 Apr 2020 02:39)  POCT Blood Glucose.: 210 mg/dL (05 Apr 2020 00:27)  POCT Blood Glucose.: 218 mg/dL (04 Apr 2020 22:56)  POCT Blood Glucose.: 198 mg/dL (04 Apr 2020 20:28)  POCT Blood Glucose.: 152 mg/dL (04 Apr 2020 17:59)  POCT Blood Glucose.: 149 mg/dL (04 Apr 2020 16:07)  POCT Blood Glucose.: 158 mg/dL (04 Apr 2020 14:13)  POCT Blood Glucose.: 167 mg/dL (04 Apr 2020 12:11)  POCT Blood Glucose.: 184 mg/dL (04 Apr 2020 09:58)  POCT Blood Glucose.: 179 mg/dL (04 Apr 2020 08:23)                          9.1    10.43 )-----------( 315      ( 05 Apr 2020 00:00 )             27.6       04-05    140  |  106  |  46<H>  ----------------------------<  199<H>  4.0   |  22  |  1.1    Ca    8.2<L>      05 Apr 2020 00:00  Phos  3.3     04-05  Mg     2.2     04-05    TPro  4.8<L>  /  Alb  2.1<L>  /  TBili  0.2  /  DBili  x   /  AST  12  /  ALT  20  /  AlkPhos  112  04-05

## 2020-04-05 NOTE — PROGRESS NOTE ADULT - ASSESSMENT
Assessment and Plan:   · Assessment		  A/P:  1.  Acute Hypoxic Respiratory Failure/  Septic Shock    somewhat improved, Fio2 lowered to 55%   2/2 Covid-19 positive    on HCQ protocol, completed  on vit, zinc  FiO2 down to 50%  clinically stable for now  will con't current care     2. Syncope - 2/2 dehydration 2/2 covid 19, no evidence of cardiac etiology    3.  CAD - stable    3.  DM II - on insulin drip     overall, slight improvement but certainly heading in right direction.    d/w Family

## 2020-04-05 NOTE — PROGRESS NOTE ADULT - ASSESSMENT
Assessment and Plan:   · Assessment		    Assessment and Plan:   · Assessment		  A/P:  1.  Acute Hypoxic Respiratory Failure/  Septic Shock    slightly better, Fio2 lowered to 50%   2/2 Covid-19, ARDS    HCQ protocol, completed  on vit, zinc  clinically stable for now  will con't current care     2. Syncope - 2/2 dehydration 2/2 covid 19, no evidence of cardiac etiology    3.  CAD - stable, from ischemic standpoint         New Onset A-Fib - 2/2 pulmonary process merlyn, no evidence of ischemic event         Highly unlikely Metoprolol 12.5mg bid to control the rate especially since pt on Levo and her lung issues are          not about to resolve soon         recommend Amiodarone 400mg PO q12 x 3 days then 200mg po q24      and start AC , CHADS-VASC=5    3.  DM II - on insulin drip     d/w Family

## 2020-04-05 NOTE — PROGRESS NOTE ADULT - ASSESSMENT
NEURO:  Sedation w/ versed, morphine- no chnages  Titrate to RASS of -2 to -3    RESP:      Acute respiratory failure      Intubated with a 7.5 tube on 3/30  on /25/50/12.5  ABG 7.37 42 120 24      Daily CXR      Solumedrol 60q12      Ramonutssin DM     CARDS:      CAD s/p cardiac stent- continue ASA, not on B-blockers at home      Levo, MAP >75       Serial cardiac enzymes .04 > .03> 0.08>0.06      Lisinopril at home, discontinued         GI-   TF;Glucerna 50 ml/hr         LR @50/hr       PPI w Protonix and Senna for bowel regimen- adding lactulose for constipation    Renal      Preserved renal function      UO 40cc/hr      Gentle hydration with LR @ 50 ml/hr      Twice daily electrolytes        Heme-      Hgb 9.5 > 8.9      On HSQ      SCD     ID-      Afebrile      WBC 15 > 13      COVID -19 + with respiratory failure      Procalcitonin 1.25      Commpleted courses of Azithromycin and Plaquenil on 4/3,      Continue Cefepime q12 as per ID    Endocrine     IDDM on lantus at home     On insulin gtt     H/O Hypothyroidism not on medications-  TSH 2.2     On solumedrol    Dispo_ continue ICU level of care NEURO:  Sedation w/ versed, morphine- no chnages  Titrate to RASS of -2 to -3    RESP:      Acute respiratory failure      Intubated with a 7.5 tube on 3/30  on /25/80/12.5      Wean vent as tolerated      Daily CXR      Solumedrol 60q12      Ramonutssin DM     CARDS:      CAD s/p cardiac stent- continue ASA, not on B-blockers at home      Levo, MAP >75       Atrial fibrillation, added metopriolol 12.5 q12h, if remains in A-fib x 24hrs will consider anticoaugulation      Lisinopril at home, discontinued         GI-        TF;Glucerna 50 ml/hr         LR @50/hr       PPI w Protonix and Senna for bowel regimen- continue lactulose until BM    Renal      Preserved renal function      UO 40cc/hr      Continue LR @ 50 ml/hr      Twice daily electrolytes        Heme-      Trend H&H      On HSQ      SCD     ID-      Monitor for temps      WBC 15 > 13      COVID -19 + with respiratory failure      Procalcitonin 1.25      Completed courses of Azithromycin and Plaquenil on 4/3,      Continue Cefepime q12 until 4/8    Endocrine     IDDM on lantus at home     On insulin gtt     H/O Hypothyroidism not on medications-  TSH 2.2     On solumedrol    Dispo_ continue ICU level of care

## 2020-04-06 NOTE — PROGRESS NOTE ADULT - SUBJECTIVE AND OBJECTIVE BOX
TRICIA SANTIZO  75y, Female  Allergy: No Known Allergies      LOS  9d    CHIEF COMPLAINT: Syncope, Pneumonia  R/O COVID (06 Apr 2020 07:43)      INTERVAL EVENTS/HPI    - T(F): , Max: 97.6 (04-05-20 @ 16:00)  - WBC Count: 14.44 (04-06-20 @ 00:30) downtrending   WBC Count: 23.59 (04-05-20 @ 11:08)   - Creatinine, Serum: 1.1 (04-06-20 @ 00:30)  Creatinine, Serum: 1.1 (04-05-20 @ 11:08)       ROS  unable to obtain history secondary to patient's mental status and/or sedation     VITALS:  T(F): 97.1, Max: 97.6 (04-05-20 @ 16:00)  HR: 97  BP: --  RR: 25Vital Signs Last 24 Hrs  T(C): 36.2 (06 Apr 2020 04:00), Max: 36.4 (05 Apr 2020 16:00)  T(F): 97.1 (06 Apr 2020 04:00), Max: 97.6 (05 Apr 2020 16:00)  HR: 97 (06 Apr 2020 04:00) (76 - 115)  BP: --  BP(mean): --  RR: 25 (06 Apr 2020 04:00) (21 - 25)  SpO2: 100% (06 Apr 2020 04:00) (97% - 100%)    PHYSICAL EXAM:  General: intubated  HEENT: NCAT  CV: RRR  Lungs: symmetric chest expansion  Skin: no rash  Neuro: sedated  Lines     FH: Non-contributory  Social Hx: Non-contributory    TESTS & MEASUREMENTS:                        10.7   14.44 )-----------( 323      ( 06 Apr 2020 00:30 )             31.8     04-06    140  |  103  |  51<H>  ----------------------------<  198<H>  3.5   |  22  |  1.1    Ca    8.1<L>      06 Apr 2020 00:30  Phos  3.7     04-06  Mg     2.1     04-06    TPro  5.5<L>  /  Alb  2.4<L>  /  TBili  1.2  /  DBili  0.2  /  AST  13  /  ALT  19  /  AlkPhos  117<H>  04-06    eGFR if Non African American: 50 mL/min/1.73M2 (04-06-20 @ 00:30)  eGFR if African American: 58 mL/min/1.73M2 (04-06-20 @ 00:30)  eGFR if Non African American: 50 mL/min/1.73M2 (04-05-20 @ 11:08)  eGFR if : 58 mL/min/1.73M2 (04-05-20 @ 11:08)    LIVER FUNCTIONS - ( 06 Apr 2020 00:30 )  Alb: 2.4 g/dL / Pro: 5.5 g/dL / ALK PHOS: 117 U/L / ALT: 19 U/L / AST: 13 U/L / GGT: x               Culture - Blood (collected 03-27-20 @ 23:00)  Source: .Blood Blood-Peripheral  Final Report (04-01-20 @ 07:00):    No Growth Final    Culture - Blood (collected 03-27-20 @ 23:00)  Source: .Blood Blood-Peripheral  Final Report (04-01-20 @ 07:00):    No Growth Final            INFECTIOUS DISEASES TESTING  Procalcitonin, Serum: 1.69 (04-04-20 @ 17:10)  MRSA PCR Result.: Negative (04-03-20 @ 13:20)  Procalcitonin, Serum: 3.23 (04-03-20 @ 01:50)  Procalcitonin, Serum: 3.66 (04-01-20 @ 23:30)  Procalcitonin, Serum: 1.25 (03-31-20 @ 17:51)  Procalcitonin, Serum: 2.13 (03-31-20 @ 05:30)  Procalcitonin, Serum: 0.48 (03-29-20 @ 06:13)  Procalcitonin, Serum: 0.48 (03-28-20 @ 11:44)  COVID-19 PCR: Detected (03-27-20 @ 23:00)  Flu B Result: Negative (03-27-20 @ 23:00)  RSV Result: Negative (03-27-20 @ 23:00)  Flu A Result: Negative (03-27-20 @ 23:00)      INFLAMMATORY MARKERS  Sedimentation Rate, Erythrocyte: 106 mm/Hr (04-06-20 @ 00:30)  C-Reactive Protein, Serum: 18.37 mg/dL (04-04-20 @ 00:00)  Sedimentation Rate, Erythrocyte: 121 mm/Hr (04-04-20 @ 00:00)  Sedimentation Rate, Erythrocyte: 128 mm/Hr (04-01-20 @ 23:30)      RADIOLOGY & ADDITIONAL TESTS:  I have personally reviewed the last available Chest xray  CXR  Xray Chest 1 View- PORTABLE-Urgent:   EXAM:  XR CHEST PORTABLE URGENT 1V            PROCEDURE DATE:  04/05/2020            INTERPRETATION:  Clinical History / Reason for exam: ett replacement    Comparison : Chest radiograph  4/5/2020    Technique/Positioning: Frontal view of the chest    Findings:    Support devices: Telemetry leads. There is an endotracheal tube in appropriate position. There is an enteric tube coursing below the diaphragm. There is a left internal jugular central venous catheter terminating at the SVC.    Cardiac/mediastinum/hilum: Stable.    Lung parenchyma/Pleura: Stable bilateral opacities     Skeleton/soft tissues: Stable    Impression:      Stable bilateral opacities     Lines and support devices as described above.                      YARIEL BAE M.D., ATTENDING RADIOLOGIST  This document has been electronically signed. Apr 5 2020  2:31PM             (04-05-20 @ 13:26)      CT      CARDIOLOGY TESTING  12 Lead ECG:   Systolic  mmHg    Diastolic BP 98 mmHg    Ventricular Rate 133 BPM    Atrial Rate 120 BPM    QRS Duration 84 ms    Q-T Interval 306 ms    QTC Calculation(Bezet) 455 ms    R Axis 47 degrees    T Axis 65 degrees    Diagnosis Line Atrial fibrillation with rapid ventricular response with premature ventricular  or aberrantly conducted complexes  Low voltage QRS  Nonspecific T wave abnormality  Abnormal ECG    Confirmed by Marilyn Garcia MD [id number]) on 4/5/2020 2:16:34 PM (04-05-20 @ 10:04)  12 Lead ECG:   Systolic  mmHg    Diastolic BP 57 mmHg    Ventricular Rate 92 BPM    Atrial Rate 258 BPM    QRS Duration 92 ms    Q-T Interval 358 ms    QTC Calculation(Bezet) 442 ms    R Axis 41 degrees    T Axis 55 degrees    Diagnosis Line Atrial fibrillation  Low voltage QRS  Nonspecific ST abnormality  Abnormal ECG    Confirmed by Marilyn Garcia MD ([id number]) on 4/5/2020 5:48:08 AM (04-05-20 @ 05:16)      MEDICATIONS  ascorbic acid 1000  atorvastatin 40  cefepime   IVPB 2000  chlorhexidine 0.12% Liquid 15  chlorhexidine 4% Liquid 1  clopidogrel Tablet 75  guaifenesin/dextromethorphan  Syrup 10  heparin  Injectable 5000  lactated ringers. 1000  lactulose Syrup 20  methylPREDNISolone sodium succinate Injectable 60  metoprolol tartrate 12.5  midazolam Infusion 0.02  morphine  Infusion 2  norepinephrine Infusion 0.05  pantoprazole  Injectable 40  senna 2  zinc sulfate 220      WEIGHT  Weight (kg): 85.3 (04-03-20 @ 06:07)  Creatinine, Serum: 1.1 mg/dL (04-06-20 @ 00:30)  Creatinine, Serum: 1.1 mg/dL (04-05-20 @ 11:08)      ANTIBIOTICS:  cefepime   IVPB 2000 milliGRAM(s) IV Intermittent every 12 hours      All available historical records have been reviewed

## 2020-04-06 NOTE — PROGRESS NOTE ADULT - ASSESSMENT
74 yo female with medical hx of HTN, DM II, Hypothyroidism HLD BIBA for evaluation of syncope, Pt also endorses Cough generalized weakness and fever of 101F  for 3-4 days duration, fall at home, PEA with ROSC after son gave CPR    IMPRESSION  # COVID-19 PNA, intubated 3/30, septic shock required pressors    Procalcitonin, Serum: 3.66 (04-01-20 @ 23:30)--> Procalcitonin, Serum: 1.69 (04-04-20 @ 17:10)    CXR b/l interstitial opacities   #Cytokine Release Syndrome   D-Dimer Assay, Quantitative: 2357 ng/mL DDU (04-06-20 @ 00:30)  Fibrinogen Assay: >700.0 mg/dL (04-06-20 @ 00:30)  Triglycerides, Serum: 95 mg/dL (04-06-20 @ 00:30)  #QTC Calculation(Bezet) 467 ms  #DM Hemoglobin A1C, Whole Blood: 9.9 (03-31-20 @ 05:30)  - completed 5 days of azithro/plaquenil   MRSA Nasal PCR negative    RECOMMENDATIONS  - D-dimer >1000, if no contraindications, would highly consider therapeutic lovenox (GFR >30, 1mg/kg BID; GFR <30 1mg/kg daily)   - rising WBC on steroids  - Cefepime 2g q12h IV  - Supportive care

## 2020-04-06 NOTE — CHART NOTE - NSCHARTNOTEFT_GEN_A_CORE
Registered Dietitian Follow-Up    ***Scroll to the bottom for RD recommendation***    Patient Profile Reviewed                           Yes [x]   No []  Nutrition History Previously Obtained        Yes []  No [x]          PERTINENT SUBJECTIVE INFORMATION (LATEST AS OF TODAY):  - spoken with RN at bedside, pt is tolerating current Glucerna   - pt is intubated to ventilator, / 51, MAP 79, on IVF, versed, insulin, pressor, Ve 11.0, Temp 36c.  - on goran hugger  - Last RD recs was not taken  - Still UTO on family history, unable to obtain height.        PERTINENT MEDICAL INFORMATIONS:  (1) p/w generalized weakness and fever. Fever of 101 for 3-4 days.  (2) Sedated w/ versed, morphine, acute resp failure. Daily CXR.  (3) Monitoring Levo and MAP.   (4) Cardiology consulted  (5) Monitoring renal function.          DIET ORDER:   Glucerna 1.2 at 50cc/hr (NGT) = 1425 kcal/ 71g protein        ANTHROPOMETRICS:  - Ht.  UNKNOWN  - Wt.   (4/1): 68kg  (4/3): 85.3kg  (4/5): 88.3kg - weight has been trending up to over 20kg more than 4 days ago, which is likely bedscale error, despite pt with 2+ edema. Will continue to monitor.   - BMI.  UNKNOWN  - IBW. UNKNOWN         PERTINENT LAB DATA:  4/6: h/h 10.7/31.8, BUN 51, glucose 198, Ca 8.1, Albumin 2.4, GFR 50  PERTINENT MEDS:  heparin, clopidogrel, abx, LR, insulin, k-chloride, metoprolol, lactulose, levophed, acetaminophen, versed, morphine, atorvastatin, zinc sulfate (started 3/29), acetaminophen,      PHYSICAL FINDINGS  - APPEARANCE:        intubated to ventilator, 2+ generalized edema, sedated  - GI FUNCTION:        LBM unknown remains  - TUBES:                     OGT  - ORAL/MOUTH:      NPO  - SKIN:                        Skin intact        NUTRITION REQUIREMENTS  WEIGHT USED:                          ABW is 68kg, NO HEIGHT, Pt appears well nourished (monitor edema)  ESTIMATED ENERGY NEEDS:       CONTINUE [ x ]      ADJUST [  ]  - from 4/2 note    ESTIMATED ENERGY NEEDS:         1360 -1496 kcal/day (20-22 kcal/kg of ABW) - without height, pt likely with BMI<30, have considered using 11-14 kcal/kg but too low. therefore, this range is solely RD judgement.  ESTIMATED PROTEIN NEEDS:         g/day (1.2-1.5 g/kg of ABW)  ESTIMATED FLUID NEEDS:             fluid per SICU team    CURRENT NUTRIENT NEEDS:    currently receiving 1425 kcal/ 71g protein  (almost meeting protein)          [ x ] PREVIOUS NUTRITION DIAGNOSIS:   (1) inadequate protein-energy intake            [ x ] ONGOING        [  ] RESOLVED            PATIENT INTERVENTION:    [  ] ORAL        [ x] EN/TF     GOAL/EXPECTED OUTCOME:     pt to meet and tolerate >85% of estimated kcal/protein via TF upon f/u in 3 days. pt to have 1BM/day.   INDICATOR/MONITORING:       RD to monitor diet order, energy intake, body composition, nutrition focused physical findings (BM, EN tolerance, electrolytes, renal profile)  NUTRITION INTERVENTION:        Enteral nutrition      RECS: (1)  Since patient has been started on Glucerna 1.2 since last week, with no reported intolerance by RN at this time 4/6, RD recommends to add No-carb Prosource TF packet x1/day to current Glucerna 1.2 at 50cc/hr. This regimen at GOAL gives a total of 1465 kcal/ 82g protein/ 972mL free water, additional flushes per SICU team. (2) Please discontinue Zinc Sulfate that started on 3/29. Pt usually be on it for 5 days only, alongside with hydroxychloroquine (if given). Adverse effect if prolonged.

## 2020-04-06 NOTE — PROGRESS NOTE ADULT - SUBJECTIVE AND OBJECTIVE BOX
TRICIA SANTIZO  [id number]  75y Female    Indication for ICU admission:  acute respiratory failure COVID-19 positive, pneumonia, HD instability  Admit Date: 3/27  ICU Date:  3/31    No Known Allergies    PAST MEDICAL & SURGICAL HISTORY:  Hypothyroidism: Hypothyroidism  Essential hypertension: HTN (hypertension)  Arthropathy: Arthritis  Uncontrolled type 2 diabetes mellitus: Diabetes mellitus type II, uncontrolled  Hyperlipidemia: Hyperlipidemia  H/O heart artery stent    Home Medications:  glyBURIDE 5 mg oral tablet: 1 tab(s) orally once a day (28 Mar 2020 05:20)  lisinopril 20 mg oral tablet: 1 tab(s) orally once a day (09 May 2018 12:13)  metFORMIN 500 mg oral tablet, extended release: 2 tab(s) orally 2 times a day (28 Mar 2020 05:19)  Plavix 75 mg oral tablet: 1 tab(s) orally once a day (09 May 2018 12:13)  rosuvastatin 20 mg oral tablet: 1 tab(s) orally once a day (28 Mar 2020 05:19)        24HRS EVENT:        NEURO:  Sedation w/ versed .137 (8mg./hr) , morphine @4  Titrate to RASS of -2 to -3    RESP:      Acute respiratory failure      Intubated with a 7.5 tube on 3/30      /25/50/15      AM ABG : 7.3/47/81/26/lac 2.1      Daily CXR      Solumedrol 60q12      Ramonutssin DM     CARDS:     new onset afib       CAD s/p cardiac stent- continue plavix, not on B-blockers at home      Levo, MAP >75 -- currently at .2 titrating to map 75      Serial cardiac enzymes .04 > .03> 0.08>.10 >0.09      Lisinopril at home, discontinued      Atorvastatin       QTc 458 last         GI-   TF;Glucerna 50 ml/hr  at goal       LR @50/hr       PPI w Protonix and Senna for bowel regimen- added lactulose until BM    Naomi      BUN/Cr 51/1.1 from 48/1.1      UO 25-50      Gentle hydration with LR @ 50 ml/hr        Heme-      H/H 10.7/31.8, stable      On HSQ      SCD      ??afib ?? AC     ID-      hypothermic--> bear hugger , Temp 97.1      WBC 14.4 ( 23.5)      Lymphocyte 0.44      COVID -19 + with respiratory failure      Procalcitonin 3>1.69      Azithromycin and Plaquenil both ended 4/3      Cefepime q12 as per ID since 4/1 ? duration-m 7days    Endocrine     IDDM on lantus at home     On insulin gtt 6     H/O Hypothyroidism not on medications-  TSH 2.2    On solumedrol    Lines:   L IJ TLC, R radial A line, ELVIA DeutschT TRICIA SANTIZO  [id number]  75y Female    Indication for ICU admission:  acute respiratory failure COVID-19 positive, pneumonia, HD instability  Admit Date: 3/27  ICU Date:  3/31    No Known Allergies    PAST MEDICAL & SURGICAL HISTORY:  Hypothyroidism: Hypothyroidism  Essential hypertension: HTN (hypertension)  Arthropathy: Arthritis  Uncontrolled type 2 diabetes mellitus: Diabetes mellitus type II, uncontrolled  Hyperlipidemia: Hyperlipidemia  H/O heart artery stent    Home Medications:  glyBURIDE 5 mg oral tablet: 1 tab(s) orally once a day (28 Mar 2020 05:20)  lisinopril 20 mg oral tablet: 1 tab(s) orally once a day (09 May 2018 12:13)  metFORMIN 500 mg oral tablet, extended release: 2 tab(s) orally 2 times a day (28 Mar 2020 05:19)  Plavix 75 mg oral tablet: 1 tab(s) orally once a day (09 May 2018 12:13)  rosuvastatin 20 mg oral tablet: 1 tab(s) orally once a day (28 Mar 2020 05:19)        24HRS EVENT:        NEURO:  Sedation w/ versed .137 (8mg./hr) , morphine @4  Titrate to RASS of -2 to -3    RESP:      Acute respiratory failure      Intubated with a 7.5 tube on 3/30      /25/50/15, weaned FIO2 to 40% monitor saturation      AM ABG : 7.3/47/81/26/lac 2.1      Daily CXR      Solumedrol 60q12      Robutssin DM     CARDS:     new onset afib, f/u EKG - converted to NSR with PVCs      CAD s/p cardiac stent- continue plavix, not on B-blockers at home      Levo, MAP >75 -- currently at 0.2 titrating to map 75      Serial cardiac enzymes .04 > .03> 0.08>.10 >0.09, no longer trending      Lisinopril at home, discontinued      Atorvastatin       QTc 458 last         GI-   TF;Glucerna 50 ml/hr  at goal       LR @50/hr       PPI w Protonix and Senna for bowel regimen       lactulose 20g q12hr    Naomi      BUN/Cr 51/1.1 from 48/1.1      UO 25-50      Gentle hydration with LR @ 50 ml/hr     lytes K 3.5, repleted     Na 142, Mg 2.1, Phos 3.7        Heme-      H/H 10.7/31.8, stable      On HSQ      SCD      pt in NSR, no need to start AC     ID-      hypothermic--> bear hudakotaer , Temp 97.1      WBC 14.4 ( 23.5)      Azithromycin and Plaquenil both ended 4/3      Cefepime q12 as per ID , end date 4/8  Lymphocyte 0.44      COVID -19 + with respiratory failure      Procalcitonin 3>1.69    Endocrine     IDDM on lantus at home     On insulin gtt d/c'd, transition to ISS     H/O Hypothyroidism not on medications-  TSH 2.2    On solumedrol    Lines:   L IJ TLC, R radial A line, ELVIA DeutschT TRICIA SANTIZO  [id number]  75y Female    Indication for ICU admission:  acute respiratory failure COVID-19 positive, pneumonia, HD instability  Admit Date: 3/27  ICU Date:  3/31    No Known Allergies    PAST MEDICAL & SURGICAL HISTORY:  Hypothyroidism: Hypothyroidism  Essential hypertension: HTN (hypertension)  Arthropathy: Arthritis  Uncontrolled type 2 diabetes mellitus: Diabetes mellitus type II, uncontrolled  Hyperlipidemia: Hyperlipidemia  H/O heart artery stent    Home Medications:  glyBURIDE 5 mg oral tablet: 1 tab(s) orally once a day (28 Mar 2020 05:20)  lisinopril 20 mg oral tablet: 1 tab(s) orally once a day (09 May 2018 12:13)  metFORMIN 500 mg oral tablet, extended release: 2 tab(s) orally 2 times a day (28 Mar 2020 05:19)  Plavix 75 mg oral tablet: 1 tab(s) orally once a day (09 May 2018 12:13)  rosuvastatin 20 mg oral tablet: 1 tab(s) orally once a day (28 Mar 2020 05:19)        24HRS EVENT:        NEURO:  Sedation w/ versed .137 (8mg./hr) , morphine @4  Titrate to RASS of -2 to -3    RESP:      Acute respiratory failure      Intubated with a 7.5 tube on 3/30      /25/50/15, weaned FIO2 to 40% monitor saturation      AM ABG : 7.3/47/81/26/lac 2.1      Daily CXR      Solumedrol 60q12      Robutssin DM     CARDS:     new onset afib, f/u EKG - converted to NSR with PVCs      CAD s/p cardiac stent- continue plavix, not on B-blockers at home      Levo, MAP >75 -- currently at 0.2 titrating to map 75      Serial cardiac enzymes .04 > .03> 0.08>.10 >0.09, no longer trending      Lisinopril at home, discontinued      Atorvastatin       QTc 458 last         GI-   TF;Glucerna 50 ml/hr  at goal       LR @50/hr       PPI w Protonix and Senna for bowel regimen       lactulose 20g q12hr    Naomi      BUN/Cr 51/1.1 from 48/1.1      UO 25-50      Gentle hydration with LR @ 50 ml/hr     lytes K 3.5, repleted     Na 142, Mg 2.1, Phos 3.7        Heme-      H/H 10.7/31.8, stable      On HSQ      SCD      pt in NSR, no need to start AC     ID-      hypothermic--> bear hugger , Temp 97.1      WBC 14.4 ( 23.5)      Azithromycin and Plaquenil completed on 4/3      Cefepime q12 as per ID , end date 4/8      Lymphocyte 0.44      COVID -19 + with respiratory failure      Procalcitonin 3>1.69    Endocrine     IDDM on lantus at home, hgb a1c 9.9%     On insulin gtt d/c'd, transition to ISS     H/O Hypothyroidism not on medications-  TSH 2.2    On solumedrol    Lines:   L IJ TLC, R radial A line, Deutsch, OGT             Daily     Daily     Diet, NPO with Tube Feed:   Tube Feeding Modality: Nasogastric  Glucerna 1.2 Oscar  Total Volume for 24 Hours (mL): 1200  Continuous  Starting Tube Feed Rate mL per Hour: 30  Increase Tube Feed Rate by (mL): 10     Every 2 hours  Until Goal Tube Feed Rate (mL per Hour): 50  Tube Feed Duration (in Hours): 24  Tube Feed Start Time: 11:00 (04-03-20 @ 10:42)      CURRENT MEDS:  Neurologic Medications  acetaminophen   Tablet .. 650 milliGRAM(s) Oral every 6 hours PRN Temp greater or equal to 38C (100.4F)  acetaminophen   Tablet .. 650 milliGRAM(s) Oral every 6 hours PRN Temp greater or equal to 38C (100.4F)  midazolam Infusion 0.02 mG/kG/Hr IV Continuous <Continuous>  morphine  Infusion 2 mG/Hr IV Continuous <Continuous>    Respiratory Medications  guaifenesin/dextromethorphan  Syrup 10 milliLiter(s) Oral every 8 hours    Cardiovascular Medications  metoprolol tartrate 12.5 milliGRAM(s) Oral two times a day  norepinephrine Infusion 0.05 MICROgram(s)/kG/Min IV Continuous <Continuous>    Gastrointestinal Medications  ascorbic acid 1000 milliGRAM(s) Oral two times a day  lactated ringers. 1000 milliLiter(s) IV Continuous <Continuous>  lactulose Syrup 20 Gram(s) Enteral Tube every 12 hours  pantoprazole  Injectable 40 milliGRAM(s) IV Push daily  senna 2 Tablet(s) Oral at bedtime  zinc sulfate 220 milliGRAM(s) Oral daily    Genitourinary Medications    Hematologic/Oncologic Medications  clopidogrel Tablet 75 milliGRAM(s) Oral daily  heparin  Injectable 5000 Unit(s) SubCutaneous every 8 hours    Antimicrobial/Immunologic Medications  cefepime   IVPB 2000 milliGRAM(s) IV Intermittent every 12 hours    Endocrine/Metabolic Medications  atorvastatin 40 milliGRAM(s) Oral at bedtime  insulin regular  human corrective regimen sliding scale   IV Push every 6 hours  methylPREDNISolone sodium succinate Injectable 60 milliGRAM(s) IV Push every 12 hours    Topical/Other Medications  chlorhexidine 0.12% Liquid 15 milliLiter(s) Oral Mucosa two times a day  chlorhexidine 4% Liquid 1 Application(s) Topical daily      ICU Vital Signs Last 24 Hrs  T(C): 36.2 (06 Apr 2020 04:00), Max: 36.4 (05 Apr 2020 16:00)  T(F): 97.1 (06 Apr 2020 04:00), Max: 97.6 (05 Apr 2020 16:00)  HR: 97 (06 Apr 2020 04:00) (76 - 115)  BP: --  BP(mean): --  ABP: 111/54 (06 Apr 2020 04:00) (111/54 - 160/62)  ABP(mean): 65 (06 Apr 2020 04:00) (65 - 90)  RR: 25 (06 Apr 2020 04:00) (21 - 25)  SpO2: 100% (06 Apr 2020 04:00) (97% - 100%)      Adult Advanced Hemodynamics Last 24 Hrs  CVP(mm Hg): --  CVP(cm H2O): --  CO: --  CI: --  PA: --  PA(mean): --  PCWP: --  SVR: --  SVRI: --  PVR: --  PVRI: --    Mode: AC/ CMV (Assist Control/ Continuous Mandatory Ventilation)  RR (machine): 25  TV (machine): 400  FiO2: 50  PEEP: 15  ITime: 1  MAP: 22  PIP: 36    ABG - ( 06 Apr 2020 02:01 )  pH, Arterial: 7.36  pH, Blood: x     /  pCO2: 47    /  pO2: 81    / HCO3: 26    / Base Excess: 0.7   /  SaO2: 96                  I&O's Summary    05 Apr 2020 07:01  -  06 Apr 2020 07:00  --------------------------------------------------------  IN: 3824.2 mL / OUT: 2705 mL / NET: 1119.2 mL      I&O's Detail    05 Apr 2020 07:01  -  06 Apr 2020 07:00  --------------------------------------------------------  IN:    Enteral Tube Flush: 280 mL    Glucerna: 1200 mL    insulin regular Infusion: 157 mL    IV PiggyBack: 700 mL    lactated ringers.: 1200 mL    midazolam Infusion: 90.8 mL    morphine  Infusion: 60 mL    norepinephrine Infusion: 131.4 mL    propofol Infusion: 5 mL  Total IN: 3824.2 mL    OUT:    Indwelling Catheter - Urethral: 2705 mL  Total OUT: 2705 mL    Total NET: 1119.2 mL          PHYSICAL EXAM:    General/Neuro  RASS:   -4           Lungs:      clear to auscultation, Normal expansion/effort.     Cardiovascular : S1, S2.  Regular rate and rhythm. no Peripheral edema   Cardiac Rhythm: Normal Sinus Rhythm    GI: Abdomen soft, Non-tender, Non-distended.    OGT w/ TFs at goal    Extremities: Extremities warm, pink, well-perfused.     Derm: Good skin turgor, no skin breakdown.      :       Deutsch catheter in place.      CXR:     LABS:  CAPILLARY BLOOD GLUCOSE  184 (06 Apr 2020 00:00)  183 (05 Apr 2020 22:00)  170 (05 Apr 2020 20:00)      POCT Blood Glucose.: 162 mg/dL (06 Apr 2020 09:04)  POCT Blood Glucose.: 103 mg/dL (05 Apr 2020 18:09)  POCT Blood Glucose.: 119 mg/dL (05 Apr 2020 16:21)  POCT Blood Glucose.: 159 mg/dL (05 Apr 2020 14:00)  POCT Blood Glucose.: 173 mg/dL (05 Apr 2020 12:16)                          10.7   14.44 )-----------( 323      ( 06 Apr 2020 00:30 )             31.8       04-06    140  |  103  |  51<H>  ----------------------------<  198<H>  3.5   |  22  |  1.1    Ca    8.1<L>      06 Apr 2020 00:30  Phos  3.7     04-06  Mg     2.1     04-06    TPro  5.5<L>  /  Alb  2.4<L>  /  TBili  1.2  /  DBili  0.2  /  AST  13  /  ALT  19  /  AlkPhos  117<H>  04-06      PT/INR - ( 06 Apr 2020 00:30 )   PT: 14.90 sec;   INR: 1.30 ratio         PTT - ( 06 Apr 2020 00:30 )  PTT:26.3 sec  CARDIAC MARKERS ( 06 Apr 2020 06:20 )  x     / x     / 14 U/L / x     / x      CARDIAC MARKERS ( 06 Apr 2020 05:30 )  x     / 0.09 ng/mL / x     / x     / 3.5 ng/mL  CARDIAC MARKERS ( 06 Apr 2020 00:30 )  x     / 0.10 ng/mL / 21 U/L / x     / x

## 2020-04-07 NOTE — PROGRESS NOTE ADULT - SUBJECTIVE AND OBJECTIVE BOX
TRICIA SANTIZO  [id number]  75y Female    Indication for ICU admission:  acute respiratory failure COVID-19 positive, pneumonia, HD instability  Admit Date: 3/27  ICU Date:  3/31    No Known Allergies    PAST MEDICAL & SURGICAL HISTORY:  Hypothyroidism: Hypothyroidism  Essential hypertension: HTN (hypertension)  Arthropathy: Arthritis  Uncontrolled type 2 diabetes mellitus: Diabetes mellitus type II, uncontrolled  Hyperlipidemia: Hyperlipidemia  H/O heart artery stent    Home Medications:  glyBURIDE 5 mg oral tablet: 1 tab(s) orally once a day (28 Mar 2020 05:20)  lisinopril 20 mg oral tablet: 1 tab(s) orally once a day (09 May 2018 12:13)  metFORMIN 500 mg oral tablet, extended release: 2 tab(s) orally 2 times a day (28 Mar 2020 05:19)  Plavix 75 mg oral tablet: 1 tab(s) orally once a day (09 May 2018 12:13)  rosuvastatin 20 mg oral tablet: 1 tab(s) orally once a day (28 Mar 2020 05:19)        24HRS EVENT:        NEURO:  Sedation w/ versed gtt 5   morphine @5  Titrate to RASS of -2 to -3    RESP:      Acute respiratory failure      Intubated with a 7.5 tube on 3/30      /25/40/12.5 plat 25-30      AM /45/69/29/ lact 1.9 sat 93%      Daily CXR      Solumedrol 60q12      Robutssin DM     CARDS:       Levo, MAP >75 -- levo held at 5:30       AM EKG NSR qtc 460      Metop PO 12.5 BID      CAD s/p cardiac stent- continue plavix, not on B-blockers at home      Serial cardiac enzymes .04 > .03> 0.08>.10 >0.09, no longer trending      Lisinopril at home, discontinued      Atorvastatin         GI-   TF: Glucerna 50 ml/hr  at goal       LR @50/hr       PPI w Protonix and Senna for bowel regimen-       lactulose until BM    Renal      Cr 1.3>1.2 , BUN 59>58      UO 40-50cc/hr       Gentle hydration with LR @ 50 ml/hr      K 4.4 Mg 2.3 Phos 3.9        Heme-      H/H 9.5/27.9 , stable      On HSQ      SCD      Ddimer 2357     ID-      98  bear hugger d/c'd     Cefepime q12 as per ID end date      Bld cx x2-  3/27 negative      zinc, vit C      WBC 19> 17.2      Lymphocyte 0.37      COVID -19 + with respiratory failure      Procalcitonin 3>1.69      Azithromycin and Plaquenil both ended 4/3    Endocrine     IDDM on lantus at home     On insulin gtt 3     H/O Hypothyroidism not on medications-  TSH 2.2    On solumedrol 60 BID    Lines:   L IJ TLC, R radial A line, Deutsch, OGT     Daily     Daily Weight in k.2 (2020 04:00)    Diet, NPO with Tube Feed:   Tube Feeding Modality: Nasogastric  Glucerna 1.2 Oscar  Total Volume for 24 Hours (mL): 1200  Continuous  Starting Tube Feed Rate mL per Hour: 30  Increase Tube Feed Rate by (mL): 10     Every 2 hours  Until Goal Tube Feed Rate (mL per Hour): 50  Tube Feed Duration (in Hours): 24  Tube Feed Start Time: 11:00 (20 @ 10:42)      CURRENT MEDS:  Neurologic Medications  acetaminophen   Tablet .. 650 milliGRAM(s) Oral every 6 hours PRN Temp greater or equal to 38C (100.4F)  acetaminophen   Tablet .. 650 milliGRAM(s) Oral every 6 hours PRN Temp greater or equal to 38C (100.4F)  midazolam Infusion 0.02 mG/kG/Hr IV Continuous <Continuous>  morphine  Infusion. 1 mG/Hr IV Continuous <Continuous>    Respiratory Medications  guaifenesin/dextromethorphan  Syrup 10 milliLiter(s) Oral every 8 hours    Cardiovascular Medications  metoprolol tartrate 12.5 milliGRAM(s) Oral two times a day    Gastrointestinal Medications  ascorbic acid 1000 milliGRAM(s) Oral two times a day  lactated ringers. 1000 milliLiter(s) IV Continuous <Continuous>  lactulose Syrup 20 Gram(s) Enteral Tube every 12 hours  pantoprazole  Injectable 40 milliGRAM(s) IV Push daily  senna 2 Tablet(s) Oral at bedtime  zinc sulfate 220 milliGRAM(s) Oral daily    Genitourinary Medications    Hematologic/Oncologic Medications  clopidogrel Tablet 75 milliGRAM(s) Oral daily  heparin  Injectable 5000 Unit(s) SubCutaneous every 8 hours    Antimicrobial/Immunologic Medications  cefepime   IVPB 2000 milliGRAM(s) IV Intermittent every 12 hours    Endocrine/Metabolic Medications  atorvastatin 40 milliGRAM(s) Oral at bedtime  insulin regular Infusion 1 Unit(s)/Hr IV Continuous <Continuous>  methylPREDNISolone sodium succinate Injectable 60 milliGRAM(s) IV Push every 12 hours    Topical/Other Medications  chlorhexidine 0.12% Liquid 15 milliLiter(s) Oral Mucosa two times a day  chlorhexidine 4% Liquid 1 Application(s) Topical daily      ICU Vital Signs Last 24 Hrs  T(C): 36.7 (2020 04:00), Max: 37 (2020 20:00)  T(F): 98 (2020 04:00), Max: 98.6 (2020 20:00)  HR: 77 (2020 04:00) (75 - 83)  BP: 142/66 (2020 20:00) (142/66 - 142/66)  BP(mean): --  ABP: 139/54 (2020 04:00) (134/54 - 157/58)  ABP(mean): 81 (2020 04:00) (81 - 88)  RR: 23 (2020 04:00) (20 - 25)  SpO2: 96% (2020 04:00) (96% - 100%)      Adult Advanced Hemodynamics Last 24 Hrs  CVP(mm Hg): --  CVP(cm H2O): --  CO: --  CI: --  PA: --  PA(mean): --  PCWP: --  SVR: --  SVRI: --  PVR: --  PVRI: --    Mode: AC/ CMV (Assist Control/ Continuous Mandatory Ventilation)  RR (machine): 25  TV (machine): 400  FiO2: 40  PEEP: 12.5  ITime: 1  MAP: 21  PIP: 32    ABG - ( 2020 02:57 )  pH, Arterial: 7.37  pH, Blood: x     /  pCO2: 45    /  pO2: 69    / HCO3: 26    / Base Excess: 0.2   /  SaO2: 93                  I&O's Summary    2020 07:01  -  2020 07:00  --------------------------------------------------------  IN: 2858.3 mL / OUT: 1135 mL / NET: 1723.3 mL      I&O's Detail    2020 07:01  -  2020 07:00  --------------------------------------------------------  IN:    Enteral Tube Flush: 60 mL    Glucerna: 1150 mL    insulin regular Infusion: 39 mL    insulin regular Infusion: 48 mL    IV PiggyBack: 50 mL    lactated ringers.: 1150 mL    midazolam Infusion: 106 mL    midazolam Infusion: 25 mL    morphine  Infusion: 72 mL    morphine  Infusion.: 17 mL    norepinephrine Infusion: 141.3 mL  Total IN: 2858.3 mL    OUT:    Indwelling Catheter - Urethral: 1135 mL  Total OUT: 1135 mL    Total NET: 1723.3 mL          PHYSICAL EXAM:    General/Neuro  RASS:             GCS:     = E   / V   / M      Deficits:                             alert & oriented x 3, no focal deficits  Pupils:    Lungs:      clear to auscultation, Normal expansion/effort.     Cardiovascular : S1, S2.  Regular rate and rhythm.  Peripheral edema   Cardiac Rhythm: Normal Sinus Rhythm    GI: Abdomen soft, Non-tender, Non-distended.    Gastrostomy / Jejunostomy tube in place.  Nasogastric tube in place.  Colostomy / Ileostomy.    Wound:    Extremities: Extremities warm, pink, well-perfused. Pulses:Rt     Lt    Derm: Good skin turgor, no skin breakdown.      :       Deutsch catheter in place.      CXR:     LABS:  CAPILLARY BLOOD GLUCOSE      POCT Blood Glucose.: 170 mg/dL (2020 06:06)  POCT Blood Glucose.: 168 mg/dL (2020 03:56)  POCT Blood Glucose.: 158 mg/dL (2020 02:08)  POCT Blood Glucose.: 152 mg/dL (2020 00:23)  POCT Blood Glucose.: 174 mg/dL (2020 22:21)  POCT Blood Glucose.: 155 mg/dL (2020 21:16)  POCT Blood Glucose.: 146 mg/dL (2020 19:56)  POCT Blood Glucose.: 162 mg/dL (2020 18:40)  POCT Blood Glucose.: 138 mg/dL (2020 16:09)  POCT Blood Glucose.: 155 mg/dL (2020 13:49)  POCT Blood Glucose.: 128 mg/dL (2020 11:47)  POCT Blood Glucose.: 162 mg/dL (2020 09:04)                          9.5    17.22 )-----------( 251      ( 2020 00:20 )             27.9       04-07    144  |  108  |  58<H>  ----------------------------<  156<H>  4.4   |  24  |  1.2    Ca    8.1<L>      2020 00:20  Phos  4.2     04-07  Mg     2.3     04-07    TPro  5.0<L>  /  Alb  2.1<L>  /  TBili  0.3  /  DBili  <0.2  /  AST  9   /  ALT  14  /  AlkPhos  92  04-07      PT/INR - ( 2020 00:20 )   PT: 16.40 sec;   INR: 1.43 ratio         PTT - ( 2020 00:20 )  PTT:29.1 sec  CARDIAC MARKERS ( 2020 06:20 )  x     / x     / 14 U/L / x     / x      CARDIAC MARKERS ( 2020 05:30 )  x     / 0.09 ng/mL / x     / x     / 3.5 ng/mL  CARDIAC MARKERS ( 2020 00:30 )  x     / 0.10 ng/mL / 21 U/L / x     / x TRICIA SANTIZO  [id number]  75y Female    Indication for ICU admission:  acute respiratory failure COVID-19 positive, pneumonia, HD instability  Admit Date: 3/27  ICU Date:  3/31    No Known Allergies    PAST MEDICAL & SURGICAL HISTORY:  Hypothyroidism: Hypothyroidism  Essential hypertension: HTN (hypertension)  Arthropathy: Arthritis  Uncontrolled type 2 diabetes mellitus: Diabetes mellitus type II, uncontrolled  Hyperlipidemia: Hyperlipidemia  H/O heart artery stent    Home Medications:  glyBURIDE 5 mg oral tablet: 1 tab(s) orally once a day (28 Mar 2020 05:20)  lisinopril 20 mg oral tablet: 1 tab(s) orally once a day (09 May 2018 12:13)  metFORMIN 500 mg oral tablet, extended release: 2 tab(s) orally 2 times a day (28 Mar 2020 05:19)  Plavix 75 mg oral tablet: 1 tab(s) orally once a day (09 May 2018 12:13)  rosuvastatin 20 mg oral tablet: 1 tab(s) orally once a day (28 Mar 2020 05:19)        24HRS EVENT:        NEURO:  Sedation w/ versed gtt 5   morphine @5  Titrate to RASS of -2 to -3    RESP:      Acute respiratory failure      Intubated with a 7.5 tube on 3/30      /25/40/12.5 plat 25-30      AM /45/69/29/ lact 1.9 sat 93%      Daily CXR      Solumedrol 60q12      Robutssin DM     CARDS:       Levo, MAP >75 -- levo held at 5:30       AM EKG NSR qtc 460      Metop PO 12.5 BID      CAD s/p cardiac stent- continue plavix, not on B-blockers at home      Serial cardiac enzymes .04 > .03> 0.08>.10 >0.09, no longer trending      Lisinopril at home, discontinued      Atorvastatin         GI-   TF: Glucerna 50 ml/hr  at goal       LR @50/hr       PPI w Protonix and Senna for bowel regimen-       lactulose until BM    Renal      Cr 1.3>1.2 , BUN 59>58      UO 40-50cc/hr       Gentle hydration with LR @ 50 ml/hr      K 4.4 Mg 2.3 Phos 3.9        Heme-      H/H 9.5/27.9 , stable      On HSQ      SCD      Ddimer 2357     ID-      98  bear hugger d/c'd     Cefepime q12 as per ID end date      Bld cx x2-  3/27 negative      zinc, vit C      WBC 19> 17.2      Lymphocyte 0.37      COVID -19 + with respiratory failure      Procalcitonin 3>1.69      Azithromycin and Plaquenil both ended 4/3    Endocrine     IDDM on lantus at home     On insulin gtt 3     H/O Hypothyroidism not on medications-  TSH 2.2    On solumedrol 60 BID    Lines:   L IJ TLC, R radial A line, Deutsch, OGT     Daily     Daily Weight in k.2 (2020 04:00)    Diet, NPO with Tube Feed:   Tube Feeding Modality: Nasogastric  Glucerna 1.2 Oscar  Total Volume for 24 Hours (mL): 1200  Continuous  Starting Tube Feed Rate mL per Hour: 30  Increase Tube Feed Rate by (mL): 10     Every 2 hours  Until Goal Tube Feed Rate (mL per Hour): 50  Tube Feed Duration (in Hours): 24  Tube Feed Start Time: 11:00 (20 @ 10:42)      CURRENT MEDS:  Neurologic Medications  acetaminophen   Tablet .. 650 milliGRAM(s) Oral every 6 hours PRN Temp greater or equal to 38C (100.4F)  acetaminophen   Tablet .. 650 milliGRAM(s) Oral every 6 hours PRN Temp greater or equal to 38C (100.4F)  midazolam Infusion 0.02 mG/kG/Hr IV Continuous <Continuous>  morphine  Infusion. 1 mG/Hr IV Continuous <Continuous>    Respiratory Medications  guaifenesin/dextromethorphan  Syrup 10 milliLiter(s) Oral every 8 hours    Cardiovascular Medications  metoprolol tartrate 12.5 milliGRAM(s) Oral two times a day    Gastrointestinal Medications  ascorbic acid 1000 milliGRAM(s) Oral two times a day  lactated ringers. 1000 milliLiter(s) IV Continuous <Continuous>  lactulose Syrup 20 Gram(s) Enteral Tube every 12 hours  pantoprazole  Injectable 40 milliGRAM(s) IV Push daily  senna 2 Tablet(s) Oral at bedtime  zinc sulfate 220 milliGRAM(s) Oral daily    Genitourinary Medications    Hematologic/Oncologic Medications  clopidogrel Tablet 75 milliGRAM(s) Oral daily  heparin  Injectable 5000 Unit(s) SubCutaneous every 8 hours    Antimicrobial/Immunologic Medications  cefepime   IVPB 2000 milliGRAM(s) IV Intermittent every 12 hours    Endocrine/Metabolic Medications  atorvastatin 40 milliGRAM(s) Oral at bedtime  insulin regular Infusion 1 Unit(s)/Hr IV Continuous <Continuous>  methylPREDNISolone sodium succinate Injectable 60 milliGRAM(s) IV Push every 12 hours    Topical/Other Medications  chlorhexidine 0.12% Liquid 15 milliLiter(s) Oral Mucosa two times a day  chlorhexidine 4% Liquid 1 Application(s) Topical daily      ICU Vital Signs Last 24 Hrs  T(C): 36.7 (2020 04:00), Max: 37 (2020 20:00)  T(F): 98 (2020 04:00), Max: 98.6 (2020 20:00)  HR: 77 (2020 04:00) (75 - 83)  BP: 142/66 (2020 20:00) (142/66 - 142/66)  BP(mean): --  ABP: 139/54 (2020 04:00) (134/54 - 157/58)  ABP(mean): 81 (2020 04:00) (81 - 88)  RR: 23 (2020 04:00) (20 - 25)  SpO2: 96% (2020 04:00) (96% - 100%)      Adult Advanced Hemodynamics Last 24 Hrs  CVP(mm Hg): --  CVP(cm H2O): --  CO: --  CI: --  PA: --  PA(mean): --  PCWP: --  SVR: --  SVRI: --  PVR: --  PVRI: --    Mode: AC/ CMV (Assist Control/ Continuous Mandatory Ventilation)  RR (machine): 25  TV (machine): 400  FiO2: 40  PEEP: 12.5  ITime: 1  MAP: 21  PIP: 32    ABG - ( 2020 02:57 )  pH, Arterial: 7.37  pH, Blood: x     /  pCO2: 45    /  pO2: 69    / HCO3: 26    / Base Excess: 0.2   /  SaO2: 93                  I&O's Summary    2020 07:01  -  2020 07:00  --------------------------------------------------------  IN: 2858.3 mL / OUT: 1135 mL / NET: 1723.3 mL      I&O's Detail    2020 07:01  -  2020 07:00  --------------------------------------------------------  IN:    Enteral Tube Flush: 60 mL    Glucerna: 1150 mL    insulin regular Infusion: 39 mL    insulin regular Infusion: 48 mL    IV PiggyBack: 50 mL    lactated ringers.: 1150 mL    midazolam Infusion: 106 mL    midazolam Infusion: 25 mL    morphine  Infusion: 72 mL    morphine  Infusion.: 17 mL    norepinephrine Infusion: 141.3 mL  Total IN: 2858.3 mL    OUT:    Indwelling Catheter - Urethral: 1135 mL  Total OUT: 1135 mL    Total NET: 1723.3 mL          PHYSICAL EXAM:    General/Neuro  RASS:      -4    Lungs:      clear to auscultation, Normal expansion/effort.     Cardiovascular : S1, S2.  Regular rate and rhythm.  no Peripheral edema   Cardiac Rhythm: Normal Sinus Rhythm    GI: Abdomen soft, Non-tender, Non-distended.    OGT    Extremities: Extremities warm, pink, well-perfused. Pulses: +Rt  +   Lt    Derm: Good skin turgor, no skin breakdown.      :       Deutsch catheter in place.      CXR:     LABS:  CAPILLARY BLOOD GLUCOSE      POCT Blood Glucose.: 170 mg/dL (2020 06:06)  POCT Blood Glucose.: 168 mg/dL (2020 03:56)  POCT Blood Glucose.: 158 mg/dL (2020 02:08)  POCT Blood Glucose.: 152 mg/dL (2020 00:23)  POCT Blood Glucose.: 174 mg/dL (2020 22:21)  POCT Blood Glucose.: 155 mg/dL (2020 21:16)  POCT Blood Glucose.: 146 mg/dL (2020 19:56)  POCT Blood Glucose.: 162 mg/dL (2020 18:40)  POCT Blood Glucose.: 138 mg/dL (2020 16:09)  POCT Blood Glucose.: 155 mg/dL (2020 13:49)  POCT Blood Glucose.: 128 mg/dL (2020 11:47)  POCT Blood Glucose.: 162 mg/dL (2020 09:04)                          9.5    17.22 )-----------( 251      ( 2020 00:20 )             27.9       04-07    144  |  108  |  58<H>  ----------------------------<  156<H>  4.4   |  24  |  1.2    Ca    8.1<L>      2020 00:20  Phos  4.2     04-07  Mg     2.3     04-07    TPro  5.0<L>  /  Alb  2.1<L>  /  TBili  0.3  /  DBili  <0.2  /  AST  9   /  ALT  14  /  AlkPhos  92  04-07      PT/INR - ( 2020 00:20 )   PT: 16.40 sec;   INR: 1.43 ratio         PTT - ( 2020 00:20 )  PTT:29.1 sec  CARDIAC MARKERS ( 2020 06:20 )  x     / x     / 14 U/L / x     / x      CARDIAC MARKERS ( 2020 05:30 )  x     / 0.09 ng/mL / x     / x     / 3.5 ng/mL  CARDIAC MARKERS ( 2020 00:30 )  x     / 0.10 ng/mL / 21 U/L / x     / x

## 2020-04-07 NOTE — PROGRESS NOTE ADULT - ASSESSMENT
NEURO:  Sedation w/ versed, morphine   Titrate to RASS of -2 to -3    RESP:      Acute respiratory failure      Intubated with a 7.5 tube on 3/30  on /25/40/15 - decreased fio2 from 50>40% on rounds      Wean vent as tolerated      Daily CXR      Solumedrol 60q12      Robutssin DM     CARDS:     new onset of afib RVR, converted back to NSR, continue metoprolol 12.5 BID, no need for AC      CAD s/p cardiac stent- continue ASA, not on B-blockers at home      Levo, MAP >75       Lisinopril at home, discontinued     elevated troponins, no longer trending         GI-        TF;Glucerna 50 ml/hr  via OGT       LR @50/hr       PPI w Protonix and Senna for bowel regimen      continue lactulose until BM    Renal-      ALIYAH , Cr stable 1.1      lebron in place, strict i&os      Continue LR @ 50 ml/hr      Twice daily electrolytes     monitor replete elytes prn      Heme-      Trend H&H      On HSQ      SCD      no AC     ID-      Monitor for temps      WBC 15 > 13      COVID -19 + with respiratory failure      Procalcitonin 1.25      Completed courses of Azithromycin and Plaquenil on 4/3,      Continue Cefepime q12 until 4/8    Endocrine     IDDM on lantus at home     On insulin gtt per protocol     H/O Hypothyroidism not on medications-  TSH 2.2     On solumedrol 60mg q12hr    Dispo_ continue ICU level of care NEURO:  Sedation w/ versed, morphine   Titrate to RASS of -2 to -3    RESP:      Acute respiratory failure      Intubated with a 7.5 tube on 3/30  on /25/40/15 - decreased fio2 from 50>40% on rounds      Wean vent as tolerated      Daily CXR      Solumedrol 60q12      Robutssin DM     CARDS:     new onset of afib RVR, converted back to NSR, continue metoprolol 12.5 BID, no need for AC      CAD s/p cardiac stent- continue ASA, not on B-blockers at home      Levo, MAP >75       Lisinopril at home, discontinued     elevated troponins, no longer trending         GI-        TF;Glucerna 50 ml/hr  via OGT       LR @50/hr       PPI w Protonix and Senna for bowel regimen      continue lactulose until BM    Renal-      Lasix 20 mg IVP today, monitor response      IVL      ALIYAH , Cr stable 1.1      lebron in place, strict i&o      Twice daily electrolytes     monitor replete elytes prn      Heme-      Trend H&H      On HSQ      SCD      no AC     ID-      Monitor for temps      WBC 15 > 13      COVID -19 + with respiratory failure      Procalcitonin 1.25      Completed courses of Azithromycin and Plaquenil on 4/3,      Continue Cefepime q12 until 4/8    Endocrine     IDDM on lantus at home     On insulin gtt per protocol     H/O Hypothyroidism not on medications-  TSH 2.2     On solumedrol 60mg q12hr    Dispo_ continue ICU level of care

## 2020-04-07 NOTE — PROGRESS NOTE ADULT - SUBJECTIVE AND OBJECTIVE BOX
TRICIA SANTIZO  75y, Female  Allergy: No Known Allergies      LOS  10d    CHIEF COMPLAINT: Syncope, Pneumonia  R/O COVID (07 Apr 2020 07:34)      INTERVAL EVENTS/HPI  - T(F): , Max: 98.6 (04-06-20 @ 20:00)  - WBC Count: 17.22 (04-07-20 @ 00:20)  WBC Count: 19.73 (04-06-20 @ 16:55)  - Creatinine, Serum: 1.2 (04-07-20 @ 00:20)  Creatinine, Serum: 1.3 (04-06-20 @ 16:55)       ROS  unable to obtain history secondary to patient's mental status and/or sedation     VITALS:  T(F): 96, Max: 98.6 (04-06-20 @ 20:00)  HR: 84  BP: 142/66  RR: 24Vital Signs Last 24 Hrs  T(C): 35.6 (07 Apr 2020 08:00), Max: 37 (06 Apr 2020 20:00)  T(F): 96 (07 Apr 2020 08:00), Max: 98.6 (06 Apr 2020 20:00)  HR: 84 (07 Apr 2020 11:00) (75 - 84)  BP: 142/66 (06 Apr 2020 20:00) (142/66 - 142/66)  BP(mean): --  RR: 24 (07 Apr 2020 11:00) (20 - 26)  SpO2: 100% (07 Apr 2020 11:39) (91% - 100%)    PHYSICAL EXAM:  General: intubated  HEENT: NCAT  CV: RRR  Lungs: symmetric chest expansion  Skin: no rash  Neuro: sedated  Lines     FH: Non-contributory  Social Hx: Non-contributory    TESTS & MEASUREMENTS:                        9.5    17.22 )-----------( 251      ( 07 Apr 2020 00:20 )             27.9     04-07    144  |  108  |  58<H>  ----------------------------<  156<H>  4.4   |  24  |  1.2    Ca    8.1<L>      07 Apr 2020 00:20  Phos  4.2     04-07  Mg     2.3     04-07    TPro  5.0<L>  /  Alb  2.1<L>  /  TBili  0.3  /  DBili  <0.2  /  AST  9   /  ALT  14  /  AlkPhos  92  04-07    eGFR if Non African American: 45 mL/min/1.73M2 (04-07-20 @ 00:20)  eGFR if : 52 mL/min/1.73M2 (04-07-20 @ 00:20)  eGFR if : 47 mL/min/1.73M2 (04-06-20 @ 16:55)  eGFR if Non African American: 41 mL/min/1.73M2 (04-06-20 @ 16:55)    LIVER FUNCTIONS - ( 07 Apr 2020 06:25 )  Alb: 2.1 g/dL / Pro: 5.0 g/dL / ALK PHOS: 92 U/L / ALT: 14 U/L / AST: 9 U/L / GGT: x               Culture - Blood (collected 03-27-20 @ 23:00)  Source: .Blood Blood-Peripheral  Final Report (04-01-20 @ 07:00):    No Growth Final    Culture - Blood (collected 03-27-20 @ 23:00)  Source: .Blood Blood-Peripheral  Final Report (04-01-20 @ 07:00):    No Growth Final            INFECTIOUS DISEASES TESTING  Procalcitonin, Serum: 1.69 (04-04-20 @ 17:10)  MRSA PCR Result.: Negative (04-03-20 @ 13:20)  Procalcitonin, Serum: 3.23 (04-03-20 @ 01:50)  Procalcitonin, Serum: 3.66 (04-01-20 @ 23:30)  Procalcitonin, Serum: 1.25 (03-31-20 @ 17:51)  Procalcitonin, Serum: 2.13 (03-31-20 @ 05:30)  Procalcitonin, Serum: 0.48 (03-29-20 @ 06:13)  Procalcitonin, Serum: 0.48 (03-28-20 @ 11:44)  COVID-19 PCR: Detected (03-27-20 @ 23:00)  Flu B Result: Negative (03-27-20 @ 23:00)  RSV Result: Negative (03-27-20 @ 23:00)  Flu A Result: Negative (03-27-20 @ 23:00)      INFLAMMATORY MARKERS  C-Reactive Protein, Serum: 13.07 mg/dL (04-06-20 @ 00:30)  Sedimentation Rate, Erythrocyte: 106 mm/Hr (04-06-20 @ 00:30)  C-Reactive Protein, Serum: 18.37 mg/dL (04-04-20 @ 00:00)  Sedimentation Rate, Erythrocyte: 121 mm/Hr (04-04-20 @ 00:00)      RADIOLOGY & ADDITIONAL TESTS:  I have personally reviewed the last available Chest xray  CXR  Xray Chest 1 View- PORTABLE-Urgent:   EXAM:  XR CHEST PORTABLE URGENT 1V            PROCEDURE DATE:  04/05/2020            INTERPRETATION:  Clinical History / Reason for exam: ett replacement    Comparison : Chest radiograph  4/5/2020    Technique/Positioning: Frontal view of the chest    Findings:    Support devices: Telemetry leads. There is an endotracheal tube in appropriate position. There is an enteric tube coursing below the diaphragm. There is a left internal jugular central venous catheter terminating at the SVC.    Cardiac/mediastinum/hilum: Stable.    Lung parenchyma/Pleura: Stable bilateral opacities     Skeleton/soft tissues: Stable    Impression:      Stable bilateral opacities     Lines and support devices as described above.                      YARIEL BAE M.D., ATTENDING RADIOLOGIST  This document has been electronically signed. Apr 5 2020  2:31PM             (04-05-20 @ 13:26)      CT      CARDIOLOGY TESTING  12 Lead ECG:   Systolic  mmHg    Diastolic BP 53 mmHg    Ventricular Rate 75 BPM    Atrial Rate 75 BPM    P-R Interval 170 ms    QRS Duration 78 ms    Q-T Interval 418 ms    QTC Calculation(Bezet) 466 ms    P Axis 64 degrees    R Axis 37 degrees    T Axis 86 degrees    Diagnosis Line Normal sinus rhythm  Low voltage QRS  Nonspecific T wave abnormality  Prolonged QT  Abnormal ECG    Confirmed by RAMYA SILVA MD [id number]) on 4/7/2020 6:08:20 AM (04-07-20 @ 04:41)  12 Lead ECG:   Systolic  mmHg    Diastolic BP 52 mmHg    Ventricular Rate 77 BPM    Atrial Rate 77 BPM    P-R Interval 168 ms    QRS Duration 80 ms    Q-T Interval 380 ms    QTC Calculation(Bezet) 430 ms    P Axis 60 degrees    R Axis 37 degrees    T Axis 86 degrees    Diagnosis Line Sinus rhythm with occasional Premature ventricular complexes  Low voltage QRS  Nonspecific T wave abnormality  Abnormal ECG    Confirmed by RAMYA SILVA MD [id number]) on 4/7/2020 6:08:17 AM (04-07-20 @ 04:22)      MEDICATIONS  ascorbic acid 1000  atorvastatin 40  cefepime   IVPB 2000  chlorhexidine 0.12% Liquid 15  chlorhexidine 4% Liquid 1  clopidogrel Tablet 75  guaifenesin/dextromethorphan  Syrup 10  heparin  Injectable 5000  insulin regular Infusion 1  lactulose Syrup 20  methylPREDNISolone sodium succinate Injectable 60  metoprolol tartrate 12.5  midazolam Infusion 0.02  morphine  Infusion. 1  norepinephrine Infusion 0.05  pantoprazole  Injectable 40  senna 2  zinc sulfate 220      WEIGHT  Weight (kg): 85.3 (04-03-20 @ 06:07)  Creatinine, Serum: 1.2 mg/dL (04-07-20 @ 00:20)  Creatinine, Serum: 1.3 mg/dL (04-06-20 @ 16:55)      ANTIBIOTICS:  cefepime   IVPB 2000 milliGRAM(s) IV Intermittent every 12 hours      All available historical records have been reviewed

## 2020-04-07 NOTE — CHART NOTE - NSCHARTNOTEFT_GEN_A_CORE
Spoke to patients family, son discussed current course of care and progression of disease. All questioned asked and answered

## 2020-04-07 NOTE — PROGRESS NOTE ADULT - ASSESSMENT
76 yo female with medical hx of HTN, DM II, Hypothyroidism HLD BIBA for evaluation of syncope, Pt also endorses Cough generalized weakness and fever of 101F  for 3-4 days duration, fall at home, PEA with ROSC after son gave CPR    IMPRESSION  # COVID-19 PNA, intubated 3/30, septic shock required pressors    Procalcitonin, Serum: 3.66 (04-01-20 @ 23:30)--> Procalcitonin, Serum: 1.69 (04-04-20 @ 17:10)    CXR b/l interstitial opacities   #Cytokine Release Syndrome   D-Dimer Assay, Quantitative: 2357 ng/mL DDU (04-06-20 @ 00:30)  Fibrinogen Assay: >700.0 mg/dL (04-06-20 @ 00:30)  Triglycerides, Serum: 95 mg/dL (04-06-20 @ 00:30)  #QTC Calculation(Bezet) 467 ms  #DM Hemoglobin A1C, Whole Blood: 9.9 (03-31-20 @ 05:30)  - completed 5 days of azithro/plaquenil   MRSA Nasal PCR negative    RECOMMENDATIONS  - D-dimer >1000, if no contraindications, would highly consider therapeutic lovenox (GFR >30, 1mg/kg BID; GFR <30 1mg/kg daily)   - Cefepime 2g q12h IV 4/1  - on steroids  - Supportive care

## 2020-04-08 NOTE — PROGRESS NOTE ADULT - SUBJECTIVE AND OBJECTIVE BOX
TRICIA SANTIZO  [id number]  75y Female    Indication for ICU admission:  acute respiratory failure COVID-19 positive, pneumonia, HD instability  Admit Date: 3/27  ICU Date:  3/31    No Known Allergies    PAST MEDICAL & SURGICAL HISTORY:  Hypothyroidism: Hypothyroidism  Essential hypertension: HTN (hypertension)  Arthropathy: Arthritis  Uncontrolled type 2 diabetes mellitus: Diabetes mellitus type II, uncontrolled  Hyperlipidemia: Hyperlipidemia  H/O heart artery stent    Home Medications:  glyBURIDE 5 mg oral tablet: 1 tab(s) orally once a day (28 Mar 2020 05:20)  lisinopril 20 mg oral tablet: 1 tab(s) orally once a day (09 May 2018 12:13)  metFORMIN 500 mg oral tablet, extended release: 2 tab(s) orally 2 times a day (28 Mar 2020 05:19)  Plavix 75 mg oral tablet: 1 tab(s) orally once a day (09 May 2018 12:13)  rosuvastatin 20 mg oral tablet: 1 tab(s) orally once a day (28 Mar 2020 05:19)        24HRS EVENT:    5mg amlopine HTN    NEURO:  Sedation w/ versed gtt 5  morphine @5  Titrate to RASS of -2 to -3    RESP:      Acute respiratory failure      Intubated with a 7.5 tube on 3/30      /25/50/10 Pplat 27      AM ABG : 7.36/46/67/26/ lac 2.3 sat 93       Daily CXR      Solumedrol 60q12      Robutssin DM     CARDS:       levo off, maintaining MAPS >70, HTN overnight gave 5mg amlodipine      AM EKG       Metop PO 12.5 BID      CAD s/p cardiac stent- continue plavix, not on B-blockers at home      Serial cardiac enzymes .04 > .03> 0.08>.10 >0.09, no longer trending      Lisinopril at home, discontinued      Atorvastatin         GI-   TF: Glucerna 50 ml/hr  at goal       IVL       PPI w Protonix and Senna for bowel regimen-       lactulose until BM    Renal      lasix 20 then 40-->  cc/hr      Cr : 1.3  BUN: 64      K 3.9 Mg :2.4 Phos 5.5        Heme-      H/H 9.6/29.8      On HSQ      SCD      Ddimer 2849     ID-      98  bear hugger d/c'd     Cefepime q12 as per ID end date 4/9     Bld cx x2-  3/27 negative      zinc, vit C      WBC 19> 17.2 >16      Lymphocyte       COVID -19 + with respiratory failure      Procalcitonin 3>1.69      Azithromycin and Plaquenil both ended /3     Cefepime     Endocrine     IDDM on lantus at home     On insulin gtt 4     H/O Hypothyroidism not on medications-  TSH 2.2    On solumedrol 60 BID    Lines:   L IJ TLC, R radial A line, Deutsch, OGT       Daily     Daily Weight in k.3 (2020 06:00)    Diet, NPO with Tube Feed:   Tube Feeding Modality: Nasogastric  Glucerna 1.2 Oscar  Total Volume for 24 Hours (mL): 1200  Continuous  Starting Tube Feed Rate mL per Hour: 30  Increase Tube Feed Rate by (mL): 10     Every 2 hours  Until Goal Tube Feed Rate (mL per Hour): 50  Tube Feed Duration (in Hours): 24  Tube Feed Start Time: 11:00 (20 @ 10:42)      CURRENT MEDS:  Neurologic Medications  acetaminophen   Tablet .. 650 milliGRAM(s) Oral every 6 hours PRN Temp greater or equal to 38C (100.4F)  acetaminophen   Tablet .. 650 milliGRAM(s) Oral every 6 hours PRN Temp greater or equal to 38C (100.4F)  QUEtiapine 25 milliGRAM(s) Oral daily    Respiratory Medications  guaifenesin/dextromethorphan  Syrup 10 milliLiter(s) Oral every 8 hours    Cardiovascular Medications  clevidipine Infusion 0.5 mG/Hr IV Continuous <Continuous>  metoprolol tartrate 12.5 milliGRAM(s) Oral two times a day    Gastrointestinal Medications  ascorbic acid 1000 milliGRAM(s) Oral two times a day  bisacodyl 5 milliGRAM(s) Oral once  lactulose Syrup 20 Gram(s) Enteral Tube every 12 hours  pantoprazole  Injectable 40 milliGRAM(s) IV Push daily  senna 2 Tablet(s) Oral at bedtime  zinc sulfate 220 milliGRAM(s) Oral daily    Genitourinary Medications    Hematologic/Oncologic Medications  clopidogrel Tablet 75 milliGRAM(s) Oral daily  heparin  Injectable 5000 Unit(s) SubCutaneous every 8 hours    Antimicrobial/Immunologic Medications  cefepime   IVPB 2000 milliGRAM(s) IV Intermittent every 12 hours    Endocrine/Metabolic Medications  atorvastatin 40 milliGRAM(s) Oral at bedtime  insulin regular Infusion 1 Unit(s)/Hr IV Continuous <Continuous>    Topical/Other Medications  chlorhexidine 0.12% Liquid 15 milliLiter(s) Oral Mucosa two times a day  chlorhexidine 4% Liquid 1 Application(s) Topical daily      ICU Vital Signs Last 24 Hrs  T(C): 35.6 (2020 08:00), Max: 37.1 (2020 20:00)  T(F): 96 (2020 08:00), Max: 98.7 (2020 20:00)  HR: 74 (2020 08:00) (74 - 93)  BP: --  BP(mean): --  ABP: 136/55 (2020 08:00) (106/43 - 177/72)  ABP(mean): 85 (2020 08:00) (57 - 110)  RR: 27 (2020 08:00) (23 - 29)  SpO2: 96% (2020 08:00) (93% - 100%)      Adult Advanced Hemodynamics Last 24 Hrs  CVP(mm Hg): --  CVP(cm H2O): --  CO: --  CI: --  PA: --  PA(mean): --  PCWP: --  SVR: --  SVRI: --  PVR: --  PVRI: --    Mode: AC/ CMV (Assist Control/ Continuous Mandatory Ventilation)  RR (machine): 25  TV (machine): 400  FiO2: 50  PEEP: 10  ITime: 1  MAP: 20  PIP: 26    ABG - ( 2020 04:51 )  pH, Arterial: 7.36  pH, Blood: x     /  pCO2: 46    /  pO2: 67    / HCO3: 26    / Base Excess: 0.2   /  SaO2: 93                  I&O's Summary    2020 07:01  -  2020 07:00  --------------------------------------------------------  IN: 1668.7 mL / OUT: 3050 mL / NET: -1381.3 mL      I&O's Detail    2020 07:01  -  2020 07:00  --------------------------------------------------------  IN:    Enteral Tube Flush: 50 mL    Glucerna: 1150 mL    insulin regular Infusion: 83 mL    IV PiggyBack: 50 mL    lactated ringers.: 100 mL    midazolam Infusion: 118 mL    morphine  Infusion.: 97.5 mL    norepinephrine Infusion: 20.2 mL  Total IN: 1668.7 mL    OUT:    Indwelling Catheter - Urethral: 3050 mL  Total OUT: 3050 mL    Total NET: -1381.3 mL          PHYSICAL EXAM:    General/Neuro  RASS:             GCS:     = E   / V   / M      Deficits:                             alert & oriented x 3, no focal deficits  Pupils:    Lungs:      clear to auscultation, Normal expansion/effort.     Cardiovascular : S1, S2.  Regular rate and rhythm.  Peripheral edema   Cardiac Rhythm: Normal Sinus Rhythm    GI: Abdomen soft, Non-tender, Non-distended.    Gastrostomy / Jejunostomy tube in place.  Nasogastric tube in place.  Colostomy / Ileostomy.    Wound:    Extremities: Extremities warm, pink, well-perfused. Pulses:Rt     Lt    Derm: Good skin turgor, no skin breakdown.      :       Deutsch catheter in place.      CXR:     LABS:  CAPILLARY BLOOD GLUCOSE      POCT Blood Glucose.: 127 mg/dL (2020 10:05)  POCT Blood Glucose.: 148 mg/dL (2020 08:00)  POCT Blood Glucose.: 153 mg/dL (2020 03:23)  POCT Blood Glucose.: 173 mg/dL (2020 23:36)  POCT Blood Glucose.: 189 mg/dL (2020 20:52)  POCT Blood Glucose.: 170 mg/dL (2020 16:07)  POCT Blood Glucose.: 158 mg/dL (2020 14:18)                          9.6    16.50 )-----------( 292      ( 2020 00:30 )             29.8       04-08    147<H>  |  107  |  68<HH>  ----------------------------<  164<H>  3.9   |  25  |  1.3    Ca    7.6<L>      2020 03:00  Phos  5.5     04-08  Mg     2.4     04-08    TPro  5.4<L>  /  Alb  2.4<L>  /  TBili  0.3  /  DBili  0.2  /  AST  10  /  ALT  17  /  AlkPhos  100  04-08      PT/INR - ( 2020 00:30 )   PT: 15.20 sec;   INR: 1.32 ratio         PTT - ( 2020 00:30 )  PTT:29.2 sec TRICIA SANTIZO  [id number]  75y Female    Indication for ICU admission:  acute respiratory failure COVID-19 positive, pneumonia, HD instability  Admit Date: 3/27  ICU Date:  3/31    No Known Allergies    PAST MEDICAL & SURGICAL HISTORY:  Hypothyroidism: Hypothyroidism  Essential hypertension: HTN (hypertension)  Arthropathy: Arthritis  Uncontrolled type 2 diabetes mellitus: Diabetes mellitus type II, uncontrolled  Hyperlipidemia: Hyperlipidemia  H/O heart artery stent    Home Medications:  glyBURIDE 5 mg oral tablet: 1 tab(s) orally once a day (28 Mar 2020 05:20)  lisinopril 20 mg oral tablet: 1 tab(s) orally once a day (09 May 2018 12:13)  metFORMIN 500 mg oral tablet, extended release: 2 tab(s) orally 2 times a day (28 Mar 2020 05:19)  Plavix 75 mg oral tablet: 1 tab(s) orally once a day (09 May 2018 12:13)  rosuvastatin 20 mg oral tablet: 1 tab(s) orally once a day (28 Mar 2020 05:19)        24HRS EVENT:    5mg amlopine HTN    NEURO:  Sedation w/ versed gtt 5  morphine @5  Titrate to RASS of -2 to -3    RESP:      Acute respiratory failure      Intubated with a 7.5 tube on 3/30      /25/50/10 Pplat 27      AM ABG : 7.36/46/67/26/ lac 2.3 sat 93       Daily CXR      Solumedrol 60q12      Robutssin DM     CARDS:       levo off, maintaining MAPS >70, HTN overnight gave 5mg amlodipine      AM EKG       Metop PO 12.5 BID      CAD s/p cardiac stent- continue plavix, not on B-blockers at home      Serial cardiac enzymes .04 > .03> 0.08>.10 >0.09, no longer trending      Lisinopril at home, discontinued      Atorvastatin         GI-   TF: Glucerna 50 ml/hr  at goal       IVL       PPI w Protonix and Senna for bowel regimen-       lactulose until BM    Renal      lasix 20 then 40-->  cc/hr      Cr : 1.3  BUN: 64      K 3.9 Mg :2.4 Phos 5.5        Heme-      H/H 9.6/29.8      On HSQ      SCD      Ddimer 2849     ID-      98  bear hugger d/c'd     Cefepime q12 as per ID end date 4/9     Bld cx x2-  3/27 negative      zinc, vit C      WBC 19> 17.2 >16      Lymphocyte       COVID -19 + with respiratory failure      Procalcitonin 3>1.69      Azithromycin and Plaquenil both ended /3     Cefepime     Endocrine     IDDM on lantus at home     On insulin gtt 4     H/O Hypothyroidism not on medications-  TSH 2.2    On solumedrol 60 BID    Lines:   L IJ TLC, R radial A line, Deutsch, OGT       Daily     Daily Weight in k.3 (2020 06:00)    Diet, NPO with Tube Feed:   Tube Feeding Modality: Nasogastric  Glucerna 1.2 Oscar  Total Volume for 24 Hours (mL): 1200  Continuous  Starting Tube Feed Rate mL per Hour: 30  Increase Tube Feed Rate by (mL): 10     Every 2 hours  Until Goal Tube Feed Rate (mL per Hour): 50  Tube Feed Duration (in Hours): 24  Tube Feed Start Time: 11:00 (20 @ 10:42)      CURRENT MEDS:  Neurologic Medications  acetaminophen   Tablet .. 650 milliGRAM(s) Oral every 6 hours PRN Temp greater or equal to 38C (100.4F)  acetaminophen   Tablet .. 650 milliGRAM(s) Oral every 6 hours PRN Temp greater or equal to 38C (100.4F)  QUEtiapine 25 milliGRAM(s) Oral daily    Respiratory Medications  guaifenesin/dextromethorphan  Syrup 10 milliLiter(s) Oral every 8 hours    Cardiovascular Medications  clevidipine Infusion 0.5 mG/Hr IV Continuous <Continuous>  metoprolol tartrate 12.5 milliGRAM(s) Oral two times a day    Gastrointestinal Medications  ascorbic acid 1000 milliGRAM(s) Oral two times a day  bisacodyl 5 milliGRAM(s) Oral once  lactulose Syrup 20 Gram(s) Enteral Tube every 12 hours  pantoprazole  Injectable 40 milliGRAM(s) IV Push daily  senna 2 Tablet(s) Oral at bedtime  zinc sulfate 220 milliGRAM(s) Oral daily    Genitourinary Medications    Hematologic/Oncologic Medications  clopidogrel Tablet 75 milliGRAM(s) Oral daily  heparin  Injectable 5000 Unit(s) SubCutaneous every 8 hours    Antimicrobial/Immunologic Medications  cefepime   IVPB 2000 milliGRAM(s) IV Intermittent every 12 hours    Endocrine/Metabolic Medications  atorvastatin 40 milliGRAM(s) Oral at bedtime  insulin regular Infusion 1 Unit(s)/Hr IV Continuous <Continuous>    Topical/Other Medications  chlorhexidine 0.12% Liquid 15 milliLiter(s) Oral Mucosa two times a day  chlorhexidine 4% Liquid 1 Application(s) Topical daily      ICU Vital Signs Last 24 Hrs  T(C): 35.6 (2020 08:00), Max: 37.1 (2020 20:00)  T(F): 96 (2020 08:00), Max: 98.7 (2020 20:00)  HR: 74 (2020 08:00) (74 - 93)  BP: --  BP(mean): --  ABP: 136/55 (2020 08:00) (106/43 - 177/72)  ABP(mean): 85 (2020 08:00) (57 - 110)  RR: 27 (2020 08:00) (23 - 29)  SpO2: 96% (2020 08:00) (93% - 100%)      Adult Advanced Hemodynamics Last 24 Hrs  CVP(mm Hg): --  CVP(cm H2O): --  CO: --  CI: --  PA: --  PA(mean): --  PCWP: --  SVR: --  SVRI: --  PVR: --  PVRI: --    Mode: AC/ CMV (Assist Control/ Continuous Mandatory Ventilation)  RR (machine): 25  TV (machine): 400  FiO2: 50  PEEP: 10  ITime: 1  MAP: 20  PIP: 26    ABG - ( 2020 04:51 )  pH, Arterial: 7.36  pH, Blood: x     /  pCO2: 46    /  pO2: 67    / HCO3: 26    / Base Excess: 0.2   /  SaO2: 93                  I&O's Summary    2020 07:01  -  2020 07:00  --------------------------------------------------------  IN: 1668.7 mL / OUT: 3050 mL / NET: -1381.3 mL      I&O's Detail    2020 07:01  -  2020 07:00  --------------------------------------------------------  IN:    Enteral Tube Flush: 50 mL    Glucerna: 1150 mL    insulin regular Infusion: 83 mL    IV PiggyBack: 50 mL    lactated ringers.: 100 mL    midazolam Infusion: 118 mL    morphine  Infusion.: 97.5 mL    norepinephrine Infusion: 20.2 mL  Total IN: 1668.7 mL    OUT:    Indwelling Catheter - Urethral: 3050 mL  Total OUT: 3050 mL    Total NET: -1381.3 mL          PHYSICAL EXAM:    General/Neuro  RASS:    -3          Lungs:      clear to auscultation, Normal expansion/effort.     Cardiovascular : S1, S2.  Regular rate and rhythm.  Peripheral edema   Cardiac Rhythm: Normal Sinus Rhythm    GI: Abdomen soft, Non-tender, mild -distended.    OGT in place tubefeeds      Extremities: Extremities warm, pink, well-perfused. Pulses: +Rt   +  Lt    Derm: Good skin turgor, no skin breakdown.      :       Deutsch catheter in place.      CXR:     LABS:  CAPILLARY BLOOD GLUCOSE      POCT Blood Glucose.: 127 mg/dL (2020 10:05)  POCT Blood Glucose.: 148 mg/dL (2020 08:00)  POCT Blood Glucose.: 153 mg/dL (2020 03:23)  POCT Blood Glucose.: 173 mg/dL (2020 23:36)  POCT Blood Glucose.: 189 mg/dL (2020 20:52)  POCT Blood Glucose.: 170 mg/dL (2020 16:07)  POCT Blood Glucose.: 158 mg/dL (2020 14:18)                          9.6    16.50 )-----------( 292      ( 2020 00:30 )             29.8       04-08    147<H>  |  107  |  68<HH>  ----------------------------<  164<H>  3.9   |  25  |  1.3    Ca    7.6<L>      2020 03:00  Phos  5.5     04-08  Mg     2.4     04-08    TPro  5.4<L>  /  Alb  2.4<L>  /  TBili  0.3  /  DBili  0.2  /  AST  10  /  ALT  17  /  AlkPhos  100  04-08      PT/INR - ( 2020 00:30 )   PT: 15.20 sec;   INR: 1.32 ratio         PTT - ( 2020 00:30 )  PTT:29.2 sec

## 2020-04-08 NOTE — CHART NOTE - NSCHARTNOTEFT_GEN_A_CORE
Patient's daughter Sagrario was updated on Ms. Taylor's condition today  She expressed understanding of the plan

## 2020-04-08 NOTE — CHART NOTE - NSCHARTNOTEFT_GEN_A_CORE
Spoke to patients family, son discussed current course of care and progression of disease. All questioned asked and answered.

## 2020-04-08 NOTE — PROGRESS NOTE ADULT - ASSESSMENT
NEURO:  Sedation w/ versed, morphine   Titrate to RASS of -2 to -3    RESP:      Acute respiratory failure      Intubated with a 7.5 tube on 3/30  on /25/40/15 - decreased fio2 from 50>40% on rounds      Wean vent as tolerated      Daily CXR      Solumedrol 60q12      Robutssin DM     CARDS:     new onset of afib RVR, converted back to NSR, continue metoprolol 12.5 BID, no need for AC      CAD s/p cardiac stent- continue ASA, not on B-blockers at home      Levo, MAP >75       Lisinopril at home, discontinued     elevated troponins, no longer trending         GI-        TF;Glucerna 50 ml/hr  via OGT       LR @50/hr       PPI w Protonix and Senna for bowel regimen      continue lactulose until BM    Renal-      Lasix 20 mg IVP today, monitor response      IVL      ALIYAH , Cr stable 1.1      lebron in place, strict i&o      Twice daily electrolytes     monitor replete elytes prn      Heme-      Trend H&H      On HSQ      SCD      no AC     ID-      Monitor for temps      WBC 15 > 13      COVID -19 + with respiratory failure      Procalcitonin 1.25      Completed courses of Azithromycin and Plaquenil on 4/3,      Continue Cefepime q12 until 4/8    Endocrine     IDDM on lantus at home     On insulin gtt per protocol     H/O Hypothyroidism not on medications-  TSH 2.2     On solumedrol 60mg q12hr    Dispo_ continue ICU level of care NEURO:  Sedation w/ versed, morphine   Titrate to RASS of -2 to -3    RESP:      Acute respiratory failure      Intubated with a 7.5 tube on 3/30  on /25/50/10      Wean vent as tolerated      Daily CXR      Solumedrol 60q12      Ramonutssin DM     CARDS:     new onset of afib RVR, converted back to NSR, continue metoprolol 12.5 BID, no need for AC      CAD s/p cardiac stent- continue ASA, not on B-blockers at home      Levo, MAP >75       Lisinopril at home, discontinued     elevated troponins, no longer trending         GI-        TF;Glucerna 50 ml/hr  via OGT       PPI w Protonix and Senna for bowel regimen      continue lactulose until BM    Renal-      Lasix 40 mg IVP today, monitor response      IVL      ALIYAH , Cr stable      lebron in place, strict i&o      Twice daily electrolytes     monitor replete elytes prn      Heme-      Trend H&H      On HSQ      SCD      no AC     ID-      Monitor for temps      WBC 15 > 13      COVID -19 + with respiratory failure      Procalcitonin 1.25      Completed courses of Azithromycin and Plaquenil on 4/3,      Continue Cefepime q12 until 4/8    Endocrine     IDDM on lantus at home     On insulin gtt per protocol     H/O Hypothyroidism not on medications-  TSH 2.2     On solumedrol 60mg q12hr    Dispo_ continue ICU level of care

## 2020-04-09 NOTE — PROGRESS NOTE ADULT - SUBJECTIVE AND OBJECTIVE BOX
TRICIA SANTIZO  [id number]  75y Female    Indication for ICU admission:  acute respiratory failure COVID-19 positive, pneumonia, HD instability  Admit Date: 3/27  ICU Date:  3/31    No Known Allergies    PAST MEDICAL & SURGICAL HISTORY:  Hypothyroidism: Hypothyroidism  Essential hypertension: HTN (hypertension)  Arthropathy: Arthritis  Uncontrolled type 2 diabetes mellitus: Diabetes mellitus type II, uncontrolled  Hyperlipidemia: Hyperlipidemia  H/O heart artery stent    Home Medications:  glyBURIDE 5 mg oral tablet: 1 tab(s) orally once a day (28 Mar 2020 05:20)  lisinopril 20 mg oral tablet: 1 tab(s) orally once a day (09 May 2018 12:13)  metFORMIN 500 mg oral tablet, extended release: 2 tab(s) orally 2 times a day (28 Mar 2020 05:19)  Plavix 75 mg oral tablet: 1 tab(s) orally once a day (09 May 2018 12:13)  rosuvastatin 20 mg oral tablet: 1 tab(s) orally once a day (28 Mar 2020 05:19)        24HRS EVENT: Hypotensive on cleviprex. Weaned and d/c'ed. On levo now. D/c'ed labtelol, now on po metoprolol 12.5mg via OG Tube bid. Off sedation since yesterday AM.      NEURO:  Sedation d/cd morphine and versed  added seroquel  Titrate to RASS of -2 to -3    RESP:      Acute respiratory failure      Intubated with a 7.5 tube on 3/30      Mode: AC/ CMV (Assist Control/ Continuous Mandatory Ventilation)  RR (machine): 25 TV (machine): 400 FiO2: 60 PEEP: 10 ITime: 1 MAP: 16 PIP: 32      AM ABG : 7.25/66/61/29      CXR improved since yesterday      d/cd Solumedrol 60q12       Hillary DM     CARDS:       Cleviprex weaned o/n, borderline BP with tachycardia, given bolus 500, levo PRN MAP goal >75     Restarted home metoprolol and d/c'ed labetalol that was started .      AM EKG       Metop PO 12.5 BID dc/d, then started labetolol 50q8h      CAD s/p cardiac stent- continue plavix, not on B-blockers at home      Serial cardiac enzymes .04 > .03> 0.08>.10 >0.09, no longer trending      Lisinopril at home, discontinued      Atorvastatin         GI-   TF: Glucerna 50 ml/hr  at goal       IVL       PPI w Protonix and Senna for bowel regimen-       lactulose until BM       Weight increased from 84kg to 87 kg today    Renal      /hr yesterday. UOP 20-60 today. Lasix 40mg IV given      Lytes- @ 00:00 Na 148   K 3.8   Phos 6.5    Mg 2.6      Potassium repleted        Heme-      H/H 10/30.9      On HSQ      SCD      Ddimer 2849     ID-      98  goran mina d/c'd      Cefepime q12 as per ID end date      Bld cx x2-  3/27 negative      zinc, vit C      WBC 16.5      Lymphocyte       COVID -19 + with respiratory failure      Procalcitonin  down to 0.42 > 3>1.69      Azithromycin and Plaquenil both ended 4/3    Endocrine     IDDM on lantus at home     On insulin gtt 4     H/O Hypothyroidism not on medications-  TSH 2.2     Dc steroids    Lines:   L IJ TLC, R radial A line, Deutsch, OGT     Daily     Daily Weight in k.3 (2020 04:00)    Diet, NPO with Tube Feed:   Tube Feeding Modality: Nasogastric  Glucerna 1.2 Oscar  Total Volume for 24 Hours (mL): 1200  Continuous  Starting Tube Feed Rate mL per Hour: 30  Increase Tube Feed Rate by (mL): 10     Every 2 hours  Until Goal Tube Feed Rate (mL per Hour): 50  Tube Feed Duration (in Hours): 24  Tube Feed Start Time: 11:00 (20 @ 10:42)      CURRENT MEDS:  Neurologic Medications  acetaminophen   Tablet .. 650 milliGRAM(s) Oral every 6 hours PRN Temp greater or equal to 38C (100.4F)  acetaminophen   Tablet .. 650 milliGRAM(s) Oral every 6 hours PRN Temp greater or equal to 38C (100.4F)  QUEtiapine 25 milliGRAM(s) Oral daily    Respiratory Medications  guaifenesin/dextromethorphan  Syrup 10 milliLiter(s) Oral every 8 hours    Cardiovascular Medications  metoprolol tartrate 12.5 milliGRAM(s) Enteral Tube two times a day  norepinephrine Infusion 0.05 MICROgram(s)/kG/Min IV Continuous <Continuous>    Gastrointestinal Medications  ascorbic acid 1000 milliGRAM(s) Oral two times a day  lactulose Syrup 20 Gram(s) Enteral Tube every 12 hours  pantoprazole  Injectable 40 milliGRAM(s) IV Push daily  senna 2 Tablet(s) Oral at bedtime  zinc sulfate 220 milliGRAM(s) Oral daily    Genitourinary Medications    Hematologic/Oncologic Medications  clopidogrel Tablet 75 milliGRAM(s) Oral daily  heparin  Injectable 5000 Unit(s) SubCutaneous every 8 hours    Antimicrobial/Immunologic Medications  cefepime   IVPB 2000 milliGRAM(s) IV Intermittent every 12 hours    Endocrine/Metabolic Medications  atorvastatin 40 milliGRAM(s) Oral at bedtime  insulin regular Infusion 1 Unit(s)/Hr IV Continuous <Continuous>    Topical/Other Medications  chlorhexidine 0.12% Liquid 15 milliLiter(s) Oral Mucosa two times a day  chlorhexidine 4% Liquid 1 Application(s) Topical daily      ICU Vital Signs Last 24 Hrs  T(C): 38 (2020 08:00), Max: 38 (2020 08:00)  T(F): 100.4 (2020 08:00), Max: 100.4 (2020 08:00)  HR: 111 (2020 08:00) (101 - 116)  BP: 119/57 (2020 08:00) (93/51 - 122/59)  BP(mean): 81 (2020 08:00) (81 - 81)  ABP: 134/54 (2020 08:00) (93/42 - 168/68)  ABP(mean): 80 (2020 08:00) (57 - 106)  RR: 30 (2020 08:00) (27 - 31)  SpO2: 98% (2020 08:00) (92% - 98%)      Adult Advanced Hemodynamics Last 24 Hrs  CVP(mm Hg): --  CVP(cm H2O): --  CO: --  CI: --  PA: --  PA(mean): --  PCWP: --  SVR: --  SVRI: --  PVR: --  PVRI: --    Mode: AC/ CMV (Assist Control/ Continuous Mandatory Ventilation)  RR (machine): 25  TV (machine): 400  FiO2: 70  PEEP: 12  ITime: 1  MAP: 20  PIP: 38    ABG - ( 2020 04:10 )  pH, Arterial: 7.25  pH, Blood: x     /  pCO2: 66    /  pO2: 61    / HCO3: 29    / Base Excess: 0.3   /  SaO2: 88                  I&O's Summary    2020 07:  -  2020 07:00  --------------------------------------------------------  IN: 315 mL / OUT: 2229 mL / NET: -1914 mL    2020 07:  -  2020 11:42  --------------------------------------------------------  IN: 294 mL / OUT: 86 mL / NET: 208 mL      I&O's Detail    2020 07:  -  2020 07:00  --------------------------------------------------------  IN:    clevidipine Infusion: 4 mL    Glucerna: 200 mL    insulin regular Infusion: 15 mL    norepinephrine Infusion: 96 mL  Total IN: 315 mL    OUT:    Indwelling Catheter - Urethral: 2227 mL    Stool: 2 mL  Total OUT: 2229 mL    Total NET: -1914 mL      2020 07:  -  2020 11:42  --------------------------------------------------------  IN:    Glucerna: 150 mL    norepinephrine Infusion: 144 mL  Total IN: 294 mL    OUT:    Indwelling Catheter - Urethral: 85 mL    Stool: 1 mL  Total OUT: 86 mL    Total NET: 208 mL          PHYSICAL EXAM:    General/Neuro  RASS: -5  Deficits: Off sedation, not arousable or reacting to pain  Pupils: 2mm b/l, reacting to light    Lungs: Intubated.  clear to auscultation, Normal expansion/effort.     Cardiovascular : S1, S2.  Regular rate and rhythm.  Peripheral edema   Cardiac Rhythm: Normal Sinus Rhythm    GI: Abdomen soft, Non-tender, Non-distended.  OG tube in place.    Extremities: Extremities warm, pink, well-perfused. Pulses:Rt pedal 1+ Lt pedal 1+    Derm: Good skin turgor, no skin breakdown.      :       Deutsch catheter in place.      CXR: Improved b/l infiltrates since yesterday    LABS:  CAPILLARY BLOOD GLUCOSE      POCT Blood Glucose.: 196 mg/dL (2020 10:07)  POCT Blood Glucose.: 109 mg/dL (2020 05:51)  POCT Blood Glucose.: 180 mg/dL (2020 00:23)  POCT Blood Glucose.: 168 mg/dL (2020 22:12)  POCT Blood Glucose.: 144 mg/dL (2020 20:25)  POCT Blood Glucose.: 204 mg/dL (2020 18:01)  POCT Blood Glucose.: 209 mg/dL (2020 16:05)  POCT Blood Glucose.: 209 mg/dL (2020 13:27)  POCT Blood Glucose.: 200 mg/dL (2020 11:59)                          10.0   20.12 )-----------( 292      ( 2020 00:00 )             30.9       04-09    148<H>  |  107  |  81<HH>  ----------------------------<  192<H>  3.8   |  24  |  1.5    Ca    7.6<L>      2020 00:00  Phos  6.5     04-09  Mg     2.6     04-09    TPro  5.2<L>  /  Alb  2.2<L>  /  TBili  0.3  /  DBili  x   /  AST  19  /  ALT  27  /  AlkPhos  105  04-09      PT/INR - ( 2020 00:00 )   PT: 14.30 sec;   INR: 1.24 ratio         PTT - ( 2020 00:00 )  PTT:33.3 sec  CARDIAC MARKERS ( 2020 00:00 )  x     / 0.10 ng/mL / 39 U/L / x     / x

## 2020-04-09 NOTE — PROGRESS NOTE ADULT - SUBJECTIVE AND OBJECTIVE BOX
TRICIA SANTIZO  75y, Female  Allergy: No Known Allergies      LOS  12d    CHIEF COMPLAINT: Syncope, Pneumonia  R/O COVID (09 Apr 2020 11:32)      INTERVAL EVENTS/HPI  - on levo   - T(F): , Max: 100.4 (04-09-20 @ 08:00)  - WBC Count: 20.12 (04-09-20 @ 00:00) uptrending   WBC Count: 16.50 (04-08-20 @ 00:30)  - Creatinine, Serum: 1.5 (04-09-20 @ 00:00) uptrending   Creatinine, Serum: 1.3 (04-08-20 @ 03:00)       ROS  unable to obtain history secondary to patient's mental status and/or sedation     VITALS:  T(F): 99.9, Max: 100.4 (04-09-20 @ 08:00)  HR: 110  BP: 133/59  RR: 29Vital Signs Last 24 Hrs  T(C): 37.7 (09 Apr 2020 12:00), Max: 38 (09 Apr 2020 08:00)  T(F): 99.9 (09 Apr 2020 12:00), Max: 100.4 (09 Apr 2020 08:00)  HR: 110 (09 Apr 2020 13:12) (101 - 116)  BP: 133/59 (09 Apr 2020 12:00) (93/51 - 133/59)  BP(mean): 85 (09 Apr 2020 12:00) (81 - 85)  RR: 29 (09 Apr 2020 12:00) (27 - 31)  SpO2: 99% (09 Apr 2020 12:00) (92% - 99%)    PHYSICAL EXAM:  General: intubated  HEENT: NCAT  CV: RRR  Lungs: symmetric chest expansion  Skin: no rash  Neuro: sedated  Lines     FH: Non-contributory  Social Hx: Non-contributory    TESTS & MEASUREMENTS:                        10.0   20.12 )-----------( 292      ( 09 Apr 2020 00:00 )             30.9     04-09    148<H>  |  107  |  81<HH>  ----------------------------<  192<H>  3.8   |  24  |  1.5    Ca    7.6<L>      09 Apr 2020 00:00  Phos  6.5     04-09  Mg     2.6     04-09    TPro  5.2<L>  /  Alb  2.2<L>  /  TBili  0.3  /  DBili  x   /  AST  19  /  ALT  27  /  AlkPhos  105  04-09    eGFR if Non African American: 34 mL/min/1.73M2 (04-09-20 @ 00:00)  eGFR if African American: 40 mL/min/1.73M2 (04-09-20 @ 00:00)    LIVER FUNCTIONS - ( 09 Apr 2020 00:00 )  Alb: 2.2 g/dL / Pro: 5.2 g/dL / ALK PHOS: 105 U/L / ALT: 27 U/L / AST: 19 U/L / GGT: x               Culture - Blood (collected 03-27-20 @ 23:00)  Source: .Blood Blood-Peripheral  Final Report (04-01-20 @ 07:00):    No Growth Final    Culture - Blood (collected 03-27-20 @ 23:00)  Source: .Blood Blood-Peripheral  Final Report (04-01-20 @ 07:00):    No Growth Final        Lactate, Blood: 2.7 mmol/L (04-09-20 @ 00:00)      INFECTIOUS DISEASES TESTING  Procalcitonin, Serum: 0.42 (04-08-20 @ 00:30)  Procalcitonin, Serum: 1.69 (04-04-20 @ 17:10)  MRSA PCR Result.: Negative (04-03-20 @ 13:20)  Procalcitonin, Serum: 3.23 (04-03-20 @ 01:50)  Procalcitonin, Serum: 3.66 (04-01-20 @ 23:30)  Procalcitonin, Serum: 1.25 (03-31-20 @ 17:51)  Procalcitonin, Serum: 2.13 (03-31-20 @ 05:30)  Procalcitonin, Serum: 0.48 (03-29-20 @ 06:13)  Procalcitonin, Serum: 0.48 (03-28-20 @ 11:44)  COVID-19 PCR: Detected (03-27-20 @ 23:00)  Flu B Result: Negative (03-27-20 @ 23:00)  RSV Result: Negative (03-27-20 @ 23:00)  Flu A Result: Negative (03-27-20 @ 23:00)      INFLAMMATORY MARKERS  Sedimentation Rate, Erythrocyte: 100 mm/Hr (04-09-20 @ 00:00)  C-Reactive Protein, Serum: 13.07 mg/dL (04-06-20 @ 00:30)  Sedimentation Rate, Erythrocyte: 106 mm/Hr (04-06-20 @ 00:30)  C-Reactive Protein, Serum: 18.37 mg/dL (04-04-20 @ 00:00)      RADIOLOGY & ADDITIONAL TESTS:  I have personally reviewed the last available Chest xray  CXR      CT      CARDIOLOGY TESTING  12 Lead ECG:   Systolic  mmHg    Diastolic BP 42 mmHg    Ventricular Rate 98 BPM    Atrial Rate 98 BPM    P-R Interval 170 ms    QRS Duration 74 ms    Q-T Interval 318 ms    QTC Calculation(Bezet) 405 ms    P Axis 67 degrees    R Axis 48 degrees    T Axis 73 degrees    Diagnosis Line Sinus rhythm with marked sinus arrhythmia  Low voltage QRS  Septal infarct , age undetermined  Abnormal ECG    Confirmed by Lg Linton ([id number]) on 4/9/2020 1:57:40 PM (04-09-20 @ 06:17)  12 Lead ECG:   Systolic  mmHg    Diastolic BP 64 mmHg    Ventricular Rate 84 BPM    Atrial Rate 84 BPM    P-R Interval 174 ms    QRS Duration 78 ms    Q-T Interval 486 ms    QTC Calculation(Bezet) 574 ms    P Axis 62 degrees    R Axis 19 degrees    T Axis 59 degrees    Diagnosis Line Normal sinus rhythm  Low voltage QRS  Nonspecific T wave abnormality  Abnormal ECG    Confirmed by Bjorn Ocampo [id number]) on 4/8/2020 9:49:32 AM (04-08-20 @ 04:45)      MEDICATIONS  ascorbic acid 1000  atorvastatin 40  cefepime   IVPB 2000  chlorhexidine 0.12% Liquid 15  chlorhexidine 4% Liquid 1  clopidogrel Tablet 75  guaifenesin/dextromethorphan  Syrup 10  heparin  Injectable 5000  insulin regular Infusion 1  lactulose Syrup 20  metoprolol tartrate 12.5  norepinephrine Infusion 0.05  pantoprazole  Injectable 40  QUEtiapine 25  senna 2  zinc sulfate 220      WEIGHT  Weight (kg): 87.3 (04-09-20 @ 06:00)  Creatinine, Serum: 1.5 mg/dL (04-09-20 @ 00:00)      ANTIBIOTICS:  cefepime   IVPB 2000 milliGRAM(s) IV Intermittent every 12 hours      All available historical records have been reviewed

## 2020-04-09 NOTE — PROGRESS NOTE ADULT - ASSESSMENT
74 yo female with medical hx of HTN, DM II, Hypothyroidism HLD BIBA for evaluation of syncope, Pt also endorses Cough generalized weakness and fever of 101F  for 3-4 days duration, fall at home, PEA with ROSC after son gave CPR    IMPRESSION  # COVID-19 PNA, intubated 3/30, septic shock requiring pressors    Procalcitonin, Serum: 3.66 (04-01-20 @ 23:30)-->Procalcitonin, Serum: 0.42 (04-08-20 @ 00:30)    CXR b/l interstitial opacities   #Cytokine Release Syndrome   D-Dimer Assay, Quantitative: 2673 ng/mL DDU (04-09-20 @ 00:00)  D-Dimer Assay, Quantitative: 2849 ng/mL DDU (04-08-20 @ 00:30)  #QTC Calculation(Bezet) 467 ms  #DM Hemoglobin A1C, Whole Blood: 9.9 (03-31-20 @ 05:30)  - completed 5 days of azithro/plaquenil   MRSA Nasal PCR negative    RECOMMENDATIONS  - Cefepime 2g q12h IV 4/1  - off steroids  - Supportive care

## 2020-04-09 NOTE — CHART NOTE - NSCHARTNOTEFT_GEN_A_CORE
Registered Dietitian Follow-Up     Patient Profile Reviewed                           Yes [x]   No []     Nutrition History Previously Obtained        Yes []  No [x]  intubated     Pertinent Subjective Information: intubated. off sedation. NGT feeds at goal. 1 pressor      Pertinent Medical Interventions: Acute respiratory failure. new onset of afib. COVID -19 +. plan to diurese      Diet order: Glucerna 1.2 @ 50ml/hr (1425 kcal/ 71g protein)     Anthropometrics:  - Ht.  UNKNOWN  - Wt.   (4/1): 68kg  (4/3): 85.3kg  (4/5): 88.3kg   (4/9): 87.3 kg   - BMI.  UNKNOWN  - IBW. UNKNOWN     Pertinent Lab Data: (4/9) H/H 10/30.9  POCT 246, 266  Na 148  BUN 81  Mg 2.6      Pertinent Meds: heparin, abx, insulin, levophed, lactulose, protonix, vit C, zinc sulfate      Physical Findings:  - Appearance: intubated   - GI function: LBM 4/9    - Tubes: NGT   - Oral/Mouth cavity: NPO  - Skin: stg 2 buttocks      Nutrition Requirements  Weight Used: 68kg cont from RD last f/u      ESTIMATED ENERGY NEEDS:         1360 -1496 kcal/day (20-22 kcal/kg of ABW) - without height, pt likely with BMI<30,  ESTIMATED PROTEIN NEEDS:          g/day (1.2-1.5 g/kg of ABW)  ESTIMATED FLUID NEEDS:              fluid per SICU team     Nutrient Intake:1425 kcal/ 71g protein        [] Previous Nutrition Diagnosis:  inadequate protein-energy intake            [x] Ongoing          [] Resolved    [] No active nutrition diagnosis identified at this time       Nutrition Intervention EN     Goal/Expected Outcome:pt to meet and tolerate >85% of estimated kcal/protein via TF upon f/u in 4 days.     Indicator/Monitoring:RD to monitor energy intake diet order, body comp, NFPF, glucose profile     Recommendation: Add 1 no carb prosource TF pkt to current TF regimen of Glucerna 1.2 @ 50ml/hr. (provides 1465 kcal/ 82g protein/ 972mL free water). Flushes per LIP

## 2020-04-09 NOTE — PROGRESS NOTE ADULT - ASSESSMENT
NEURO:  Sedation w/ versed, morphine   Titrate to RASS of -2 to -3    RESP:      Acute respiratory failure      Intubated with a 7.5 tube on 3/30  on /25/50/10      Wean vent as tolerated      Daily CXR      Solumedrol 60q12      Ramonutssin DM     CARDS:     new onset of afib RVR, converted back to NSR, continue metoprolol 12.5 BID, no need for AC      CAD s/p cardiac stent- continue ASA, not on B-blockers at home      Levo, MAP >75       Lisinopril at home, discontinued     elevated troponins, no longer trending         GI-        TF;Glucerna 50 ml/hr  via OGT       PPI w Protonix and Senna for bowel regimen      Monitor BMs on lactulose    Renal-      Lasix 40 mg IVP today, monitor response      IVL      ALIYAH , Cr stable      lebron in place, strict i&o      Twice daily electrolytes     monitor replete elytes prn      Heme-      Trend H&H      On HSQ      SCD      no AC     ID-      Monitor for temps      WBC 15 > 13      COVID -19 + with respiratory failure      Procalcitonin 1.25      Completed courses of Azithromycin and Plaquenil on 4/3,      Continue Cefepime q12 until 4/8    Endocrine     IDDM on lantus at home     On insulin gtt per protocol     H/O Hypothyroidism not on medications-  TSH 2.2     Dc steroids    Dispo_ continue ICU level of care

## 2020-04-10 NOTE — PROGRESS NOTE ADULT - SUBJECTIVE AND OBJECTIVE BOX
TRICIA SANTIZO  75y, Female  Allergy: No Known Allergies      LOS  13d    CHIEF COMPLAINT: Syncope, Pneumonia  R/O COVID (10 Apr 2020 10:48)      INTERVAL EVENTS/HPI  - oliguric  - T(F): , Max: 100.8 (04-10-20 @ 08:00)  - WBC Count: 28.20 (04-10-20 @ 00:29)  WBC Count: 20.12 (04-09-20 @ 00:00)  - Creatinine, Serum: 2.6 (04-10-20 @ 00:29)  Creatinine, Serum: 2.4 (04-09-20 @ 16:00)       ROS  unable to obtain history secondary to patient's mental status and/or sedation     VITALS:  T(F): 100.8, Max: 100.8 (04-10-20 @ 08:00)  HR: 96  BP: 130/60  RR: 26Vital Signs Last 24 Hrs  T(C): 38.2 (10 Apr 2020 08:00), Max: 38.2 (10 Apr 2020 08:00)  T(F): 100.8 (10 Apr 2020 08:00), Max: 100.8 (10 Apr 2020 08:00)  HR: 96 (10 Apr 2020 08:00) (90 - 110)  BP: 130/60 (10 Apr 2020 08:00) (128/60 - 138/58)  BP(mean): 86 (10 Apr 2020 08:00) (84 - 90)  RR: 26 (10 Apr 2020 08:00) (23 - 26)  SpO2: 99% (10 Apr 2020 08:00) (99% - 100%)    PHYSICAL EXAM:  General: intubated  HEENT: NCAT  CV: RRR  Lungs: symmetric chest expansion  Skin: no rash  Neuro: sedated  Lines     FH: Non-contributory  Social Hx: Non-contributory    TESTS & MEASUREMENTS:                        9.7    28.20 )-----------( 283      ( 10 Apr 2020 00:29 )             30.5     04-10    144  |  102  |  107<HH>  ----------------------------<  206<H>  5.3<H>   |  20  |  2.6<H>    Ca    7.6<L>      10 Apr 2020 00:29  Phos  8.5     04-10  Mg     2.9     04-10    TPro  5.3<L>  /  Alb  2.1<L>  /  TBili  0.5  /  DBili  x   /  AST  63<H>  /  ALT  112<H>  /  AlkPhos  152<H>  04-10    eGFR if Non African American: 18 mL/min/1.73M2 (04-10-20 @ 00:29)  eGFR if African American: 20 mL/min/1.73M2 (04-10-20 @ 00:29)  eGFR if Non African American: 19 mL/min/1.73M2 (04-09-20 @ 16:00)  eGFR if African American: 22 mL/min/1.73M2 (04-09-20 @ 16:00)    LIVER FUNCTIONS - ( 10 Apr 2020 00:29 )  Alb: 2.1 g/dL / Pro: 5.3 g/dL / ALK PHOS: 152 U/L / ALT: 112 U/L / AST: 63 U/L / GGT: x               Culture - Blood (collected 03-27-20 @ 23:00)  Source: .Blood Blood-Peripheral  Final Report (04-01-20 @ 07:00):    No Growth Final    Culture - Blood (collected 03-27-20 @ 23:00)  Source: .Blood Blood-Peripheral  Final Report (04-01-20 @ 07:00):    No Growth Final        Lactate, Blood: 2.7 mmol/L (04-09-20 @ 00:00)      INFECTIOUS DISEASES TESTING  Procalcitonin, Serum: 0.51 (04-09-20 @ 00:00)  Procalcitonin, Serum: 0.42 (04-08-20 @ 00:30)  Procalcitonin, Serum: 1.69 (04-04-20 @ 17:10)  MRSA PCR Result.: Negative (04-03-20 @ 13:20)  Procalcitonin, Serum: 3.23 (04-03-20 @ 01:50)  Procalcitonin, Serum: 3.66 (04-01-20 @ 23:30)  Procalcitonin, Serum: 1.25 (03-31-20 @ 17:51)  Procalcitonin, Serum: 2.13 (03-31-20 @ 05:30)  Procalcitonin, Serum: 0.48 (03-29-20 @ 06:13)  Procalcitonin, Serum: 0.48 (03-28-20 @ 11:44)  COVID-19 PCR: Detected (03-27-20 @ 23:00)  Flu B Result: Negative (03-27-20 @ 23:00)  RSV Result: Negative (03-27-20 @ 23:00)  Flu A Result: Negative (03-27-20 @ 23:00)      INFLAMMATORY MARKERS  Sedimentation Rate, Erythrocyte: 126 mm/Hr (04-10-20 @ 00:29)  Sedimentation Rate, Erythrocyte: 100 mm/Hr (04-09-20 @ 00:00)  C-Reactive Protein, Serum: 5.80 mg/dL (04-09-20 @ 00:00)  C-Reactive Protein, Serum: 13.07 mg/dL (04-06-20 @ 00:30)      RADIOLOGY & ADDITIONAL TESTS:  I have personally reviewed the last available Chest xray  CXR      CT      CARDIOLOGY TESTING  12 Lead ECG:   Systolic  mmHg    Diastolic BP 45 mmHg    Ventricular Rate 103 BPM    Atrial Rate 103 BPM    P-R Interval 160 ms    QRS Duration 76 ms    Q-T Interval 352 ms    QTC Calculation(Bezet) 461 ms    P Axis 71 degrees    R Axis 56 degrees    T Axis72 degrees    Diagnosis Line Sinus tachycardia  Low voltage QRS  Borderline ECG    Confirmed by RAMYA SILVA MD ([id number]) on 4/10/2020 6:04:01 AM (04-10-20 @ 04:00)  12 Lead ECG:   Systolic  mmHg    Diastolic BP 42 mmHg    Ventricular Rate 98 BPM    Atrial Rate 98 BPM    P-R Interval 170 ms    QRS Duration 74 ms    Q-T Interval 318 ms    QTC Calculation(Bezet) 405 ms    P Axis 67 degrees    R Axis 48 degrees    T Axis 73 degrees    Diagnosis Line Sinus rhythm with marked sinus arrhythmia  Low voltage QRS  Septal infarct , age undetermined  Abnormal ECG    Confirmed by Lg Linton ([id number]) on 4/9/2020 1:57:40 PM (04-09-20 @ 06:17)      MEDICATIONS  ascorbic acid 1000  atorvastatin 40  chlorhexidine 0.12% Liquid 15  chlorhexidine 4% Liquid 1  clopidogrel Tablet 75  guaifenesin/dextromethorphan  Syrup 10  heparin  Injectable 5000  insulin lispro (HumaLOG) corrective regimen sliding scale   insulin regular Infusion 1  lactated ringers. 1000  lactated ringers. 1000  meropenem  IVPB 2  metoprolol tartrate 12.5  pantoprazole  Injectable 40  phenylephrine    Infusion 0.1  QUEtiapine 25  senna 2  vasopressin Infusion 0.04  zinc sulfate 220      WEIGHT  Weight (kg): 87.3 (04-09-20 @ 06:00)  Creatinine, Serum: 2.6 mg/dL (04-10-20 @ 00:29)  Creatinine, Serum: 2.4 mg/dL (04-09-20 @ 16:00)      ANTIBIOTICS:  meropenem  IVPB 2 milliGRAM(s) IV Intermittent every 6 hours      All available historical records have been reviewed

## 2020-04-10 NOTE — PROGRESS NOTE ADULT - SUBJECTIVE AND OBJECTIVE BOX
TRICIA SANTIZO  [id number]  75y Female    Indication for ICU admission:  acute respiratory failure COVID-19 positive, pneumonia, HD instability  Admit Date: 3/27  ICU Date:  3/31    No Known Allergies    PAST MEDICAL & SURGICAL HISTORY:  Hypothyroidism: Hypothyroidism  Essential hypertension: HTN (hypertension)  Arthropathy: Arthritis  Uncontrolled type 2 diabetes mellitus: Diabetes mellitus type II, uncontrolled  Hyperlipidemia: Hyperlipidemia  H/O heart artery stent    Home Medications:  glyBURIDE 5 mg oral tablet: 1 tab(s) orally once a day (28 Mar 2020 05:20)  lisinopril 20 mg oral tablet: 1 tab(s) orally once a day (09 May 2018 12:13)  metFORMIN 500 mg oral tablet, extended release: 2 tab(s) orally 2 times a day (28 Mar 2020 05:19)  Plavix 75 mg oral tablet: 1 tab(s) orally once a day (09 May 2018 12:13)  rosuvastatin 20 mg oral tablet: 1 tab(s) orally once a day (28 Mar 2020 05:19)      24HRS EVENT: Oliguric overnight. Restarted insulin gtt.    NEURO:  Sedation d/cd morphine and versed  added seroquel  Titrate to RASS of -2 to -3    RESP:      Acute respiratory failure      Intubated with a 7.5 tube on 3/30      Mode: AC/ CMV (Assist Control/ Continuous Mandatory Ventilation)  RR (machine): 25 TV (machine): 400 FiO2: 60 PEEP: 10 ITime: 1 MAP: 16 PIP: 32      AM ABG : 7.256/.6      Daily CXR - Improved since yesterday      d/cd Solumedrol 60q12       Robutskarl DM     CARDS:      On lakia 4.5. Weaned to 2mcg. Vaso added. MAP target >75.   Cleviprex weaned  prior to rounds.o/n, borderline BP with tachycardia, given bolus 500, levo PRN MAP goal >75    Restarted metoprolol 12.5mg po bid      AM EKG - NSR      CAD s/p cardiac stent- continue plavix, not on B-blockers at home      Serial cardiac enzymes .04 > .03> 0.08>.10 >0.09, no longer trending      Lisinopril at home, discontinued      Atorvastatin         GI-   TF: Glucerna 50 ml/hr  at goal       IVL       PPI w Protonix and Senna for bowel regimen-       Dc lactulose due to multiple watery BMs    Renal      lasix 40 yesterday with adequate UO. Oliguric overnight. Worsening renal function Bun/Cr 107/2.6. Yesterday AM 81/2.5.      Potassium repleted      IVF LR bolus 1000cc then /hr  Heme-      H/H 10/30.9      On HSQ      SCD      Ddimer 2849     ID-      98  goran keeley d/c'd      Cefepime q12 as per ID end date       Meropenem started for Wt count 28. 1 dose of vancomycin 1g.     Bld cx x2-  3/27 negative      zinc, vit C      WBC 16.5      Lymphocyte       COVID -19 + with respiratory failure      Procalcitonin  down to 0.42 > 3>1.69      Azithromycin and Plaquenil both ended 4/3      WBC now 20 > 28    Endocrine     IDDM on lantus at home     On insulin gtt 4     H/O Hypothyroidism not on medications-  TSH 2.2    Lines:   L IJ TLC, R radial A line, Deutsch, OGT     Daily     Daily Weight in k.3 (10 Apr 2020 04:00)    Diet, NPO with Tube Feed:   Tube Feeding Modality: Nasogastric  Glucerna 1.2 Oscar  Total Volume for 24 Hours (mL): 1200  Continuous  Starting Tube Feed Rate mL per Hour: 30  Increase Tube Feed Rate by (mL): 10     Every 2 hours  Until Goal Tube Feed Rate (mL per Hour): 50  Tube Feed Duration (in Hours): 24  Tube Feed Start Time: 11:00 (20 @ 10:42)      CURRENT MEDS:  Neurologic Medications  acetaminophen   Tablet .. 650 milliGRAM(s) Oral every 6 hours PRN Temp greater or equal to 38C (100.4F)  QUEtiapine 25 milliGRAM(s) Oral daily    Respiratory Medications  guaifenesin/dextromethorphan  Syrup 10 milliLiter(s) Oral every 8 hours    Cardiovascular Medications  metoprolol tartrate 12.5 milliGRAM(s) Enteral Tube two times a day  phenylephrine    Infusion 0.1 MICROgram(s)/kG/Min IV Continuous <Continuous>    Gastrointestinal Medications  ascorbic acid 1000 milliGRAM(s) Oral two times a day  lactated ringers. 1000 milliLiter(s) IV Continuous <Continuous>  lactated ringers. 1000 milliLiter(s) IV Continuous <Continuous>  pantoprazole  Injectable 40 milliGRAM(s) IV Push daily  senna 2 Tablet(s) Oral at bedtime  zinc sulfate 220 milliGRAM(s) Oral daily    Genitourinary Medications    Hematologic/Oncologic Medications  clopidogrel Tablet 75 milliGRAM(s) Oral daily  heparin  Injectable 5000 Unit(s) SubCutaneous every 8 hours    Antimicrobial/Immunologic Medications  meropenem  IVPB 2 milliGRAM(s) IV Intermittent every 6 hours    Endocrine/Metabolic Medications  atorvastatin 40 milliGRAM(s) Oral at bedtime  insulin lispro (HumaLOG) corrective regimen sliding scale   SubCutaneous three times a day before meals  insulin regular Infusion 1 Unit(s)/Hr IV Continuous <Continuous>  vasopressin Infusion 0.04 Unit(s)/Min IV Continuous <Continuous>    Topical/Other Medications  chlorhexidine 0.12% Liquid 15 milliLiter(s) Oral Mucosa two times a day  chlorhexidine 4% Liquid 1 Application(s) Topical daily      ICU Vital Signs Last 24 Hrs  T(C): 38.2 (10 Apr 2020 08:00), Max: 38.2 (10 Apr 2020 08:00)  T(F): 100.8 (10 Apr 2020 08:00), Max: 100.8 (10 Apr 2020 08:00)  HR: 96 (10 Apr 2020 08:00) (90 - 113)  BP: 130/60 (10 Apr 2020 08:00) (128/60 - 138/58)  BP(mean): 86 (10 Apr 2020 08:00) (84 - 90)  ABP: 124/76 (10 Apr 2020 08:00) (107/47 - 143/53)  ABP(mean): 74 (10 Apr 2020 08:00) (67 - 83)  RR: 26 (10 Apr 2020 08:00) (23 - 29)  SpO2: 99% (10 Apr 2020 08:00) (99% - 100%)      Adult Advanced Hemodynamics Last 24 Hrs  CVP(mm Hg): --  CVP(cm H2O): --  CO: --  CI: --  PA: --  PA(mean): --  PCWP: --  SVR: --  SVRI: --  PVR: --  PVRI: --    Mode: AC/ CMV (Assist Control/ Continuous Mandatory Ventilation)  RR (machine): 30  TV (machine): 400  FiO2: 60  PEEP: 15  ITime: 1  MAP: 22  PIP: 36    ABG - ( 10 Apr 2020 02:45 )  pH, Arterial: ?7.256 pH, Blood: x     /  pCO2: 55    /  pO2: 88    / HCO3: ?24.6 / Base Excess: ?-2.9 /  SaO2: 96                  I&O's Summary    2020 07:  -  10 Apr 2020 07:00  --------------------------------------------------------  IN: 2774.5 mL / OUT: 596 mL / NET: 2178.5 mL    10 Apr 2020 07:  -  10 Apr 2020 10:56  --------------------------------------------------------  IN: 240.3 mL / OUT: 10 mL / NET: 230.3 mL      I&O's Detail    2020 07:  -  10 Apr 2020 07:00  --------------------------------------------------------  IN:    Enteral Tube Flush: 45 mL    Glucerna: 1150 mL    insulin regular Infusion: 4 mL    insulin regular Infusion: 36 mL    IV PiggyBack: 150 mL    norepinephrine Infusion: 560 mL    Oral Fluid: 60 mL    phenylephrine   Infusion: 769.5 mL  Total IN: 2774.5 mL    OUT:    Indwelling Catheter - Urethral: 595 mL    Stool: 1 mL  Total OUT: 596 mL    Total NET: 2178.5 mL      10 Apr 2020 07:  -  10 Apr 2020 10:56  --------------------------------------------------------  IN:    Glucerna: 100 mL    insulin regular Infusion: 18 mL    phenylephrine   Infusion: 122.3 mL  Total IN: 240.3 mL    OUT:    Indwelling Catheter - Urethral: 10 mL  Total OUT: 10 mL    Total NET: 230.3 mL          PHYSICAL EXAM:    General/Neuro  RASS: -4  Deficits:Off sedation since yesterday.   Pupils: 2mm b/l. Reactive to light. Moves eyes in response to deep stimulus.  Lungs:  Intubated.    clear to auscultation, Normal expansion/effort.     Cardiovascular : S1, S2.  Regular rate and rhythm.  Peripheral edema   Cardiac Rhythm: Normal Sinus Rhythm    GI: Abdomen soft, Non-tender, Non-distended.  OG tube in place.    Extremities: Extremities warm, pink, well-perfused. Pulses:Rt DP 1+     Lt DP 1+    Derm: Good skin turgor, no skin breakdown.      :  Deutsch catheter in place.      CXR: B/l infiltrates stable since yesterday    LABS:  CAPILLARY BLOOD GLUCOSE      POCT Blood Glucose.: 225 mg/dL (10 Apr 2020 08:20)  POCT Blood Glucose.: 238 mg/dL (10 Apr 2020 07:09)  POCT Blood Glucose.: 293 mg/dL (10 Apr 2020 05:47)  POCT Blood Glucose.: 195 mg/dL (2020 22:34)  POCT Blood Glucose.: 94 mg/dL (2020 19:08)  POCT Blood Glucose.: 126 mg/dL (2020 17:25)  POCT Blood Glucose.: 183 mg/dL (2020 15:24)  POCT Blood Glucose.: 218 mg/dL (2020 14:15)  POCT Blood Glucose.: 246 mg/dL (2020 13:27)  POCT Blood Glucose.: 266 mg/dL (2020 12:18)                          9.7    28.20 )-----------( 283      ( 10 Apr 2020 00:29 )             30.5       04-10    144  |  102  |  107<HH>  ----------------------------<  206<H>  5.3<H>   |  20  |  2.6<H>    Ca    7.6<L>      10 Apr 2020 00:29  Phos  8.5     04-10  Mg     2.9     04-10    TPro  5.3<L>  /  Alb  2.1<L>  /  TBili  0.5  /  DBili  x   /  AST  63<H>  /  ALT  112<H>  /  AlkPhos  152<H>  04-10      PT/INR - ( 2020 00:00 )   PT: 14.30 sec;   INR: 1.24 ratio         PTT - ( 2020 00:00 )  PTT:33.3 sec  CARDIAC MARKERS ( 2020 00:00 )  x     / 0.10 ng/mL / 39 U/L / x     / x

## 2020-04-10 NOTE — PROGRESS NOTE ADULT - ASSESSMENT
74 yo female with medical hx of HTN, DM II, Hypothyroidism HLD BIBA for evaluation of syncope, Pt also endorses Cough generalized weakness and fever of 101F  for 3-4 days duration, fall at home, PEA with ROSC after son gave CPR    IMPRESSION  # COVID-19 PNA, intubated 3/30, septic shock requiring pressors    Procalcitonin, Serum: 3.66 (04-01-20 @ 23:30)-->Procalcitonin, Serum: 0.51 (04-09-20 @ 00:00)    CXR b/l interstitial opacities   #Cytokine Release Syndrome   D-Dimer Assay, Quantitative: 2673 ng/mL DDU (04-09-20 @ 00:00)  D-Dimer Assay, Quantitative: 2849 ng/mL DDU (04-08-20 @ 00:30)  #QTC Calculation(Bezet) 467 ms  #DM Hemoglobin A1C, Whole Blood: 9.9 (03-31-20 @ 05:30)  - completed 5 days of azithro/plaquenil   MRSA Nasal PCR negative    RECOMMENDATIONS  - DECREASE to Fatoumata 1g q12h iv crcl 26 abx since 4/1  - off steroids  - Supportive care , grave prognosis

## 2020-04-10 NOTE — PROGRESS NOTE ADULT - ASSESSMENT
NEURO:  Off sedation since 4/8  RASS -4    RESP:      Acute respiratory failure      Intubated with a 7.5 tube on 3/30  on /25/50/10      Wean vent as tolerated      Daily CXR      Solumedrol 60q12      Hillary DM     CARDS:     new onset of afib RVR, converted back to NSR, continue metoprolol 12.5 BID, no need for AC      CAD s/p cardiac stent- continue ASA, not on B-blockers at home      Levo, MAP >75       Lisinopril at home, discontinued     elevated troponins, no longer trending         GI-        TF;Glucerna 50 ml/hr  via OGT       PPI w Protonix and Senna for bowel regimen      Monitor BMs on lactulose    Renal-      Lasix 40 given yesterday. Oliguric since with worsening renal function. IVF: 1000cc bolus X1, LR 100cc/hr      IVL      ALIYAH , Cr stable      lebron in place, strict i&o      Twice daily electrolytes     monitor replete elytes prn      Heme-      Trend H&H      On HSQ      SCD      no AC     ID-      Monitor for temps      WBC 20 ->28K. Meropenem for abx and 1 dose of vancomycin      COVID -19 + with respiratory failure      Procalcitonin 1.25      Completed courses of Azithromycin and Plaquenil on 4/3,      Continue Cefepime q12 until 4/9    Endocrine     IDDM on lantus at home     On insulin gtt per protocol     H/O Hypothyroidism not on medications-  TSH 2.2     Dc steroids

## 2020-04-11 NOTE — PROGRESS NOTE ADULT - ASSESSMENT
NEURO:  Off sedation since 4/8  RASS -3  Dc seroquel today    RESP:      Acute respiratory failure      Intubated with a 7.5 tube on 3/30  on /25/55/15      Wean vent as tolerated      Daily CXR      Dc solumedrol      Robutssin DM     CARDS:     new onset of afib RVR, converted back to NSR, continue metoprolol 12.5 BID, no need for AC      CAD s/p cardiac stent- continue ASA, not on B-blockers at home      Levo, vaso MAP >75       Lisinopril at home, discontinued     elevated troponins, no longer trending         GI-        TF; Glucerna 20 ml/hr  via OGT       PPI w Protonix and Senna for bowel regimen      Monitor BMs. Stopped lactulose due to multiple watery BMs    Renal-      Lasix 40 given yesterday. Oliguric since with worsening renal function. IVF: LR 100cc/hr      IVL      ALIYAH , Cr stable      lebron in place, strict i&o      Twice daily electrolytes     monitor replete elytes prn      Heme-      Trend H&H      On HSQ      SCD      no AC     ID-      Monitor for temps      Meropenem 1g q12 hr for WBC 28K and Procalcitonin of 17.      WBC 20 ->28K. Meropenem for abx and 1 dose of vancomycin      COVID -19 + with respiratory failure      Procalcitonin 17      Completed courses of Azithromycin and Plaquenil on 4/3,      Continue Cefepime q12 until 4/9    Endocrine     IDDM on lantus at home     On insulin gtt per protocol     H/O Hypothyroidism not on medications-  TSH 2.2     Dc steroids

## 2020-04-11 NOTE — PROGRESS NOTE ADULT - SUBJECTIVE AND OBJECTIVE BOX
TRICIA SANTIZO  75y, Female  Allergy: No Known Allergies      LOS  14d    CHIEF COMPLAINT: Syncope, Pneumonia  R/O COVID (2020 10:49)      INTERVAL EVENTS/HPI  - T(F): , Max: 99.1 (20 @ 08:00)  - WBC Count: 27.89 (20 @ 01:10)  WBC Count: 28.20 (04-10-20 @ 00:29)  - Creatinine, Serum: 3.3 (20 @ 01:10)  Creatinine, Serum: 3.2 (04-10-20 @ 16:00)       ROS  cannot obtain secondary to patient's sedation and/or mental status    VITALS:  T(F): 99.1, Max: 99.1 (20 @ 08:00)  HR: 123  BP: 118/56  RR: 32Vital Signs Last 24 Hrs  T(C): 37.3 (2020 08:00), Max: 37.3 (2020 08:00)  T(F): 99.1 (2020 08:00), Max: 99.1 (2020 08:00)  HR: 123 (2020 12:00) (84 - 123)  BP: 118/56 (2020 12:00) (118/56 - 130/62)  BP(mean): 80 (2020 12:00) (80 - 89)  RR: 32 (2020 12:00) (30 - 32)  SpO2: 55% (2020 12:00) (55% - 100%)    PHYSICAL EXAM:  Gen: Intubated  CV: RRR  Lungs: Bilateral chest expansion  Neuro: Sedated  Lines    FH: Non-contributory  Social Hx: Non-contributory    TESTS & MEASUREMENTS:                        8.7    27.89 )-----------( 241      ( 2020 01:10 )             27.2     04-11    140  |  102  |  122<HH>  ----------------------------<  188<H>  4.9   |  18  |  3.3<H>    Ca    7.4<L>      2020 01:10  Phos  7.4     04-10  Mg     2.9     04-10    TPro  4.8<L>  /  Alb  1.8<L>  /  TBili  0.3  /  DBili  x   /  AST  33  /  ALT  82<H>  /  AlkPhos  161<H>      eGFR if Non African American: 13 mL/min/1.73M2 (20 @ 01:10)  eGFR if African American: 15 mL/min/1.73M2 (20 @ 01:10)  eGFR if Non African American: 14 mL/min/1.73M2 (04-10-20 @ 16:00)  eGFR if African American: 16 mL/min/1.73M2 (04-10-20 @ 16:00)    LIVER FUNCTIONS - ( 2020 01:10 )  Alb: 1.8 g/dL / Pro: 4.8 g/dL / ALK PHOS: 161 U/L / ALT: 82 U/L / AST: 33 U/L / GGT: x           Urinalysis Basic - ( 2020 04:36 )    Color: Yellow / Appearance: Slightly Turbid / S.018 / pH: x  Gluc: x / Ketone: Trace  / Bili: Negative / Urobili: <2 mg/dL   Blood: x / Protein: 100 mg/dL / Nitrite: Negative   Leuk Esterase: Negative / RBC: 18 /HPF / WBC 14 /HPF   Sq Epi: x / Non Sq Epi: 2 /HPF / Bacteria: Negative        Culture - Blood (collected 20 @ 23:00)  Source: .Blood Blood-Peripheral  Final Report (20 @ 07:00):    No Growth Final    Culture - Blood (collected 20 @ 23:00)  Source: .Blood Blood-Peripheral  Final Report (20 @ 07:00):    No Growth Final        Lactate, Blood: 2.7 mmol/L (20 @ 00:00)      INFECTIOUS DISEASES TESTING  Procalcitonin, Serum: 17.70 (04-10-20 @ 16:00)  Procalcitonin, Serum: 11.10 (04-10-20 @ 00:29)  Procalcitonin, Serum: 0.51 (20 @ 00:00)  Procalcitonin, Serum: 0.42 (20 @ 00:30)  Procalcitonin, Serum: 1.69 (20 @ 17:10)  MRSA PCR Result.: Negative (20 @ 13:20)  Procalcitonin, Serum: 3.23 (20 @ 01:50)  Procalcitonin, Serum: 3.66 (20 @ 23:30)  Procalcitonin, Serum: 1.25 (20 @ 17:51)  Procalcitonin, Serum: 2.13 (20 @ 05:30)  Procalcitonin, Serum: 0.48 (20 @ 06:13)  Procalcitonin, Serum: 0.48 (20 @ 11:44)  COVID-19 PCR: Detected (20 @ 23:00)  Flu B Result: Negative (20 @ 23:00)  RSV Result: Negative (20 @ 23:00)  Flu A Result: Negative (20 @ 23:00)      INFLAMMATORY MARKERS  C-Reactive Protein, Serum: 29.87 mg/dL (04-10-20 @ 16:00)  Sedimentation Rate, Erythrocyte: 126 mm/Hr (04-10-20 @ 00:29)  Sedimentation Rate, Erythrocyte: 100 mm/Hr (20 @ 00:00)  C-Reactive Protein, Serum: 5.80 mg/dL (20 @ 00:00)      RADIOLOGY & ADDITIONAL TESTS:  I have personally reviewed the last available Chest xray  CXR      CT      CARDIOLOGY TESTING  12 Lead ECG:   Systolic  mmHg    Diastolic BP 57 mmHg    Ventricular Rate 92 BPM    Atrial Rate 92 BPM    P-R Interval 158 ms    QRS Duration 72 ms    Q-T Interval 360 ms    QTC Calculation(Bezet) 445 ms    P Axis 69 degrees    R Axis 59 degrees    T Axis 77 degrees    Diagnosis Line Normal sinus rhythm  Low voltage QRS  Borderline ECG    Confirmed by RAMYA SILVA MD [id number]) on 2020 6:02:57 AM (20 @ 05:33)  12 Lead ECG:   Systolic  mmHg    Diastolic BP 45 mmHg    Ventricular Rate 103 BPM    Atrial Rate 103 BPM    P-R Interval 160 ms    QRS Duration 76 ms    Q-T Interval 352 ms    QTC Calculation(Bezet) 461 ms    P Axis 71 degrees    R Axis 56 degrees    T Axis72 degrees    Diagnosis Line Sinus tachycardia  Low voltage QRS  Borderline ECG    Confirmed by RAMYA SILVA MD [id number]) on 4/10/2020 6:04:01 AM (04-10-20 @ 04:00)      MEDICATIONS  ascorbic acid 1000  atorvastatin 40  chlorhexidine 0.12% Liquid 15  chlorhexidine 4% Liquid 1  clopidogrel Tablet 75  guaifenesin/dextromethorphan  Syrup 10  heparin  Injectable 5000  insulin lispro (HumaLOG) corrective regimen sliding scale   lactated ringers. 1000  meropenem  IVPB 1000  metoprolol tartrate 12.5  metoprolol tartrate Injectable 10  norepinephrine Infusion 0.05  pantoprazole  Injectable 40  senna 2  vasopressin Infusion 0.04  zinc sulfate 220      WEIGHT  Weight (kg): 87.3 (20 @ 06:00)  Creatinine, Serum: 3.3 mg/dL (20 @ 01:10)  Creatinine, Serum: 3.2 mg/dL (04-10-20 @ 16:00)      ANTIBIOTICS:  meropenem  IVPB 1000 milliGRAM(s) IV Intermittent every 12 hours      All available historical records have been reviewed

## 2020-04-11 NOTE — CONSULT NOTE ADULT - REASON FOR ADMISSION
Syncope, Pneumonia  R/O COVID
Syncope, Pneumonia  R/O COVID
Syncope, Pneumonia  R/O COVID Acute respiratory failure
Syncope, Pneumonia  R/O COVID

## 2020-04-11 NOTE — CONSULT NOTE ADULT - ASSESSMENT
ALIYAH/ respiratory failure/ viral pneumonia/ COVID posive/ sepsis :  # anuric   # ALIYAH/ ATN in anture  # on 2 pressors  # d/c LR   # decrease emilia too 500 q 24  # d/c vit c  # change feed to nepro, ph noted  # will place udall and start hd today in view of anuria, uf 500 cc   # overall prognosis poor  # cased discussed with SICU team   # will follow

## 2020-04-11 NOTE — PROCEDURE NOTE - NSICDXPROCEDURE_GEN_ALL_CORE_FT
PROCEDURES:  Insertion, arterial line, percutaneous 31-Mar-2020 06:17:28  Tania Patel
PROCEDURES:  Insertion of triple lumen catheter with imaging guidance 31-Mar-2020 06:14:11  Tania Patel
PROCEDURES:  Tube thoracostomy 11-Apr-2020 15:13:16  Andrey Coleman

## 2020-04-11 NOTE — CHART NOTE - NSCHARTNOTEFT_GEN_A_CORE
Patient was found at 2:30 to have been desaturating and hypotensive. Levophed was increased to 2mcg/kg and phenylephrine was re-added. FiO2 was increased to 100%. Breath sounds were not appreciable bilaterally due to the ventilator, chest XR was obtained and patient was found to have a right sided pneumothorax. Patient was rapidly prepared to have a right chest tube placed, Dr Coleman arrived and placed a right sided 28 Fr Chest tube with an immediate rush of air and improvement in the patient's blood pressure and oxygen saturation. Will make family aware of the event.

## 2020-04-11 NOTE — PROGRESS NOTE ADULT - SUBJECTIVE AND OBJECTIVE BOX
TRICIA SANTIZO  [id number]  75y Female    Indication for ICU admission:  acute respiratory failure COVID-19 positive, pneumonia, HD instability  Admit Date: 3/27  ICU Date:  3/31    No Known Allergies    PAST MEDICAL & SURGICAL HISTORY:  Hypothyroidism: Hypothyroidism  Essential hypertension: HTN (hypertension)  Arthropathy: Arthritis  Uncontrolled type 2 diabetes mellitus: Diabetes mellitus type II, uncontrolled  Hyperlipidemia: Hyperlipidemia  H/O heart artery stent    Home Medications:  glyBURIDE 5 mg oral tablet: 1 tab(s) orally once a day (28 Mar 2020 05:20)  lisinopril 20 mg oral tablet: 1 tab(s) orally once a day (09 May 2018 12:13)  metFORMIN 500 mg oral tablet, extended release: 2 tab(s) orally 2 times a day (28 Mar 2020 05:19)  Plavix 75 mg oral tablet: 1 tab(s) orally once a day (09 May 2018 12:13)  rosuvastatin 20 mg oral tablet: 1 tab(s) orally once a day (28 Mar 2020 05:19)      24HRS EVENT: Hypotensive, lakia increased to 1.5mcg. Remains oliguric despite lasix 40mg IVP given yesterday. Made NPO due to pressor requirements.    NEURO:  Sedation d/cd morphine and versed  Discontinue seroquel  RASS -3, grimaces to pain    RESP:      Acute respiratory failure      Intubated with a 7.5 tube on 3/30      Vent: 400/30/55/15      AM ABG : 7.25/53/109/24      Daily CXR - Stable since yesterday      d/cd Solumedrol 60q12       Robutssin DM     CARDS:       Cleviprex weaned       Hypotensive with tachycardia, given bolus 500 4/10      Wean lakia to d/c today. Increase levo. Will dc vaso if MAP>75 on levo.      Metoprolol 12.5 BID  - Afib. WIll continue to beta block if tachycardic on levo, do not add lakia.      EKG NSR      CAD s/p cardiac stent- continue plavix, not on B-blockers at home      Lopressor,  Lisinopril discontinued      Atorvastatin         GI-   TF: Made NPO due to pressor requirements last night. WIll restart TF at 20/hr of glucerna.           LR @ 100        PPI w Protonix and Senna for bowel regimen-       Pt has had multiple BM's, lactulose d/c'ed.    Renal      UOP 5-10ml/hr ,        Given 40mg Lasix 4/10- minimal response       Cr uptrending : 1.5>2.4>2.6>3.2      Nephrology consulted today for possible dialysis for oliguric and worsening renal function       BMP: Na: 140/ K+ 4.9       +1g calcium         Heme-      Hg 10>9.7>8.7      On HSQ      SCD      Ddimer 2849     ID-      Continue meropenem 1g Q12. 1 dose of vancomycin 1g given yesterday.      Procalcitonin 17.7 last night      WBC ct 27.8K      Cefepime q12 as per ID end date       Bld cx x2-  3/27 negative      WBC : 20.12>28.20>27.89       COVID -19 + with respiratory failure      Azithromycin and Plaquenil both ended 4/3    Endocrine     IDDM on lantus at home     Will restart insulin drip due to restarting TF     H/O Hypothyroidism not on medications-  TSH 2.2    Lines:   L IJ TLC, R radial A line, Deutsch, OGT     Daily     Daily Weight in k.3 (2020 04:30)    Diet, NPO:   Except Medications (04-10-20 @ 23:42)      CURRENT MEDS:  Neurologic Medications  acetaminophen   Tablet .. 650 milliGRAM(s) Oral every 6 hours PRN Temp greater or equal to 38C (100.4F)    Respiratory Medications  guaifenesin/dextromethorphan  Syrup 10 milliLiter(s) Oral every 8 hours    Cardiovascular Medications  metoprolol tartrate 12.5 milliGRAM(s) Enteral Tube two times a day  metoprolol tartrate Injectable 10 milliGRAM(s) IV Push once  norepinephrine Infusion 0.05 MICROgram(s)/kG/Min IV Continuous <Continuous>    Gastrointestinal Medications  ascorbic acid 1000 milliGRAM(s) Oral two times a day  lactated ringers. 1000 milliLiter(s) IV Continuous <Continuous>  pantoprazole  Injectable 40 milliGRAM(s) IV Push daily  senna 2 Tablet(s) Oral at bedtime  zinc sulfate 220 milliGRAM(s) Oral daily    Genitourinary Medications    Hematologic/Oncologic Medications  clopidogrel Tablet 75 milliGRAM(s) Oral daily  heparin  Injectable 5000 Unit(s) SubCutaneous every 8 hours    Antimicrobial/Immunologic Medications  meropenem  IVPB 1000 milliGRAM(s) IV Intermittent every 12 hours    Endocrine/Metabolic Medications  atorvastatin 40 milliGRAM(s) Oral at bedtime  insulin regular Infusion 1 Unit(s)/Hr IV Continuous <Continuous>  vasopressin Infusion 0.04 Unit(s)/Min IV Continuous <Continuous>    Topical/Other Medications  chlorhexidine 0.12% Liquid 15 milliLiter(s) Oral Mucosa two times a day  chlorhexidine 4% Liquid 1 Application(s) Topical daily      ICU Vital Signs Last 24 Hrs  T(C): 37.3 (2020 08:00), Max: 37.7 (10 Apr 2020 12:00)  T(F): 99.1 (2020 08:00), Max: 99.8 (10 Apr 2020 12:00)  HR: 88 (2020 10:48) (84 - 102)  BP: 129/60 (2020 08:00) (129/60 - 133/63)  BP(mean): 87 (2020 08:00) (87 - 91)  ABP: 107/49 (2020 08:00) (107/49 - 129/48)  ABP(mean): 63 (2020 08:00) (63 - 76)  RR: 30 (2020 08:00) (30 - 32)  SpO2: 100% (2020 10:48) (96% - 100%)      Adult Advanced Hemodynamics Last 24 Hrs  CVP(mm Hg): --  CVP(cm H2O): --  CO: --  CI: --  PA: --  PA(mean): --  PCWP: --  SVR: --  SVRI: --  PVR: --  PVRI: --    Mode: AC/ CMV (Assist Control/ Continuous Mandatory Ventilation)  RR (machine): 30  TV (machine): 400  FiO2: 55  PEEP: 15  ITime: 1  MAP: 23  PIP: 33    ABG - ( 2020 03:48 )  pH, Arterial: 7.25  pH, Blood: x     /  pCO2: 53    /  pO2: 109   / HCO3: 24    / Base Excess: -3.8  /  SaO2: 97                  I&O's Summary    10 Apr 2020 07:01  -  2020 07:00  --------------------------------------------------------  IN: 3751.4 mL / OUT: 159 mL / NET: 3592.4 mL    2020 07:  -  2020 10:57  --------------------------------------------------------  IN: 328.5 mL / OUT: 10 mL / NET: 318.5 mL      I&O's Detail    10 Apr 2020 07:  -  2020 07:00  --------------------------------------------------------  IN:    Glucerna: 600 mL    insulin regular Infusion: 85 mL    insulin regular Infusion: 59 mL    IV PiggyBack: 1000 mL    lactated ringers.: 1200 mL    norepinephrine Infusion: 57.4 mL    phenylephrine   Infusion: 702 mL    vasopressin Infusion: 48 mL  Total IN: 3751.4 mL    OUT:    Indwelling Catheter - Urethral: 159 mL  Total OUT: 159 mL    Total NET: 3592.4 mL      2020 07:  -  2020 10:57  --------------------------------------------------------  IN:    IV PiggyBack: 50 mL    lactated ringers.: 200 mL    norepinephrine Infusion: 49.1 mL    phenylephrine   Infusion: 24.6 mL    vasopressin Infusion: 4.8 mL  Total IN: 328.5 mL    OUT:    Indwelling Catheter - Urethral: 10 mL  Total OUT: 10 mL    Total NET: 318.5 mL          PHYSICAL EXAM:    General/Neuro  RASS: -3  Deficits: [place] to pain, withdraws to pain. Not opening eyes spontaneously or moving extremities  Pupils: 2mm b/l, reactive to light    Lungs: Intubated.      clear to auscultation, Normal expansion/effort.     Cardiovascular : S1, S2.  Regular rate and rhythm.  Peripheral edema   Cardiac Rhythm: Normal Sinus Rhythm    GI: Abdomen soft, Non-tender, Non-distended. OG Tube in place.    Extremities: Extremities warm, pink, well-perfused. Pulses:Rt  DP 1+   Lt DP 1+    Derm: Good skin turgor, no skin breakdown.      :       Deutsch catheter in place.      CXR: B/l infiltrates, stable    LABS:  CAPILLARY BLOOD GLUCOSE      POCT Blood Glucose.: 164 mg/dL (2020 09:45)  POCT Blood Glucose.: 154 mg/dL (2020 07:56)  POCT Blood Glucose.: 91 mg/dL (2020 06:15)  POCT Blood Glucose.: 67 mg/dL (2020 05:10)  POCT Blood Glucose.: 147 mg/dL (2020 02:02)  POCT Blood Glucose.: 217 mg/dL (10 Apr 2020 21:43)  POCT Blood Glucose.: 198 mg/dL (10 Apr 2020 20:23)  POCT Blood Glucose.: 95 mg/dL (10 Apr 2020 16:28)  POCT Blood Glucose.: 103 mg/dL (10 Apr 2020 15:50)  POCT Blood Glucose.: 117 mg/dL (10 Apr 2020 14:45)  POCT Blood Glucose.: 141 mg/dL (10 Apr 2020 13:36)                          8.7    27.89 )-----------( 241      ( 2020 01:10 )             27.2       04-11    140  |  102  |  122<HH>  ----------------------------<  188<H>  4.9   |  18  |  3.3<H>    Ca    7.4<L>      2020 01:10  Phos  7.4     04-10  Mg     2.9     04-10    TPro  4.8<L>  /  Alb  1.8<L>  /  TBili  0.3  /  DBili  x   /  AST  33  /  ALT  82<H>  /  AlkPhos  161<H>  04-11      PT/INR - ( 2020 01:10 )   PT: 14.70 sec;   INR: 1.28 ratio         PTT - ( 2020 01:10 )  PTT:37.8 sec  CARDIAC MARKERS ( 10 Apr 2020 16:00 )  x     / x     / 178 U/L / x     / x          Urinalysis Basic - ( 2020 04:36 )    Color: Yellow / Appearance: Slightly Turbid / S.018 / pH: x  Gluc: x / Ketone: Trace  / Bili: Negative / Urobili: <2 mg/dL   Blood: x / Protein: 100 mg/dL / Nitrite: Negative   Leuk Esterase: Negative / RBC: 18 /HPF / WBC 14 /HPF   Sq Epi: x / Non Sq Epi: 2 /HPF / Bacteria: Negative

## 2020-04-11 NOTE — CHART NOTE - NSCHARTNOTEFT_GEN_A_CORE
spoke with son, informed him that patient is still on vent, will be dialyzed today  -Priya Beltre psychiatry resident,[id number]

## 2020-04-11 NOTE — CONSULT NOTE ADULT - SUBJECTIVE AND OBJECTIVE BOX
[facility]  INITIAL CONSULT NOTE  --------------------------------------------------------------------------------  HPI:        PAST HISTORY  --------------------------------------------------------------------------------  PAST MEDICAL & SURGICAL HISTORY:  Hypothyroidism: Hypothyroidism  Essential hypertension: HTN (hypertension)  Arthropathy: Arthritis  Uncontrolled type 2 diabetes mellitus: Diabetes mellitus type II, uncontrolled  Hyperlipidemia: Hyperlipidemia  H/O heart artery stent    FAMILY HISTORY:    PAST SOCIAL HISTORY:    ALLERGIES & MEDICATIONS  --------------------------------------------------------------------------------  Allergies    No Known Allergies    Intolerances      Standing Inpatient Medications  ascorbic acid 1000 milliGRAM(s) Oral two times a day  atorvastatin 40 milliGRAM(s) Oral at bedtime  chlorhexidine 0.12% Liquid 15 milliLiter(s) Oral Mucosa two times a day  chlorhexidine 4% Liquid 1 Application(s) Topical daily  clopidogrel Tablet 75 milliGRAM(s) Oral daily  guaifenesin/dextromethorphan  Syrup 10 milliLiter(s) Oral every 8 hours  heparin  Injectable 5000 Unit(s) SubCutaneous every 8 hours  insulin lispro (HumaLOG) corrective regimen sliding scale   SubCutaneous three times a day before meals  insulin regular  human recombinant. 7 Unit(s) SubCutaneous once  lactated ringers. 1000 milliLiter(s) IV Continuous <Continuous>  meropenem  IVPB 1000 milliGRAM(s) IV Intermittent every 12 hours  metoprolol tartrate 12.5 milliGRAM(s) Enteral Tube two times a day  metoprolol tartrate Injectable 10 milliGRAM(s) IV Push once  norepinephrine Infusion 0.05 MICROgram(s)/kG/Min IV Continuous <Continuous>  pantoprazole  Injectable 40 milliGRAM(s) IV Push daily  senna 2 Tablet(s) Oral at bedtime  vasopressin Infusion 0.04 Unit(s)/Min IV Continuous <Continuous>  zinc sulfate 220 milliGRAM(s) Oral daily    PRN Inpatient Medications  acetaminophen   Tablet .. 650 milliGRAM(s) Oral every 6 hours PRN        VITALS/PHYSICAL EXAM  --------------------------------------------------------------------------------  T(C): 37.3 (04-11-20 @ 08:00), Max: 37.3 (04-11-20 @ 08:00)  HR: 123 (04-11-20 @ 12:00) (84 - 123)  BP: 118/56 (04-11-20 @ 12:00) (118/56 - 130/62)  RR: 32 (04-11-20 @ 12:00) (30 - 32)  SpO2: 55% (04-11-20 @ 12:00) (55% - 100%)  Wt(kg): --        04-10-20 @ 07:01  -  04-11-20 @ 07:00  --------------------------------------------------------  IN: 3751.4 mL / OUT: 159 mL / NET: 3592.4 mL    04-11-20 @ 07:01  -  04-11-20 @ 13:36  --------------------------------------------------------  IN: 832.5 mL / OUT: 25 mL / NET: 807.5 mL      Physical Exam:  	Gen: intubated/ventilated              on 2 pressors     LABS/STUDIES  --------------------------------------------------------------------------------              8.7    27.89 >-----------<  241      [04-11-20 @ 01:10]              27.2     140  |  102  |  122  ----------------------------<  188      [04-11-20 @ 01:10]  4.9   |  18  |  3.3        Ca     7.4     [04-11-20 @ 01:10]      Mg     2.9     [04-10-20 @ 16:00]      Phos  7.4     [04-10-20 @ 16:00]    TPro  4.8  /  Alb  1.8  /  TBili  0.3  /  DBili  x   /  AST  33  /  ALT  82  /  AlkPhos  161  [04-11-20 @ 01:10]    PT/INR: PT 14.70, INR 1.28       [04-11-20 @ 01:10]  PTT: 37.8       [04-11-20 @ 01:10]          [04-10-20 @ 16:00]        [04-11-20 @ 01:10]    Creatinine Trend:  SCr 3.3 [04-11 @ 01:10]  SCr 3.2 [04-10 @ 16:00]  SCr 2.6 [04-10 @ 00:29]  SCr 2.4 [04-09 @ 16:00]  SCr 1.5 [04-09 @ 00:00]    Urinalysis - [04-11-20 @ 04:36]      Color Yellow / Appearance Slightly Turbid / SG 1.018 / pH 6.0      Gluc Negative / Ketone Trace  / Bili Negative / Urobili <2 mg/dL       Blood Small / Protein 100 mg/dL / Leuk Est Negative / Nitrite Negative      RBC 18 / WBC 14 / Hyaline 5 / Gran  / Sq Epi  / Non Sq Epi 2 / Bacteria Negative      Ferritin 1069      [04-10-20 @ 16:00]  HbA1c 9.9      [03-31-20 @ 05:30]  TSH 2.62      [03-28-20 @ 11:44]  Lipid: chol --, TG 95, HDL --, LDL --      [04-06-20 @ 00:30]      Free Light Chains: kappa 6.13, lambda 5.53, ratio = 1.11      [04-06 @ 00:30] Patient is a 76 yo female with medical hx of HTN, DM II, Hypothyroidism HLD BIBA for evaluation of syncope, Pt also endorses Cough generalized weakness and fever of 101F  for 3-4 days duration  patient intubated /ventilated  on 2 pressors   anuric   called for anuria and ALIYAH   --------------------------------------------------------------------------------  PAST MEDICAL & SURGICAL HISTORY:  Hypothyroidism: Hypothyroidism  Essential hypertension: HTN (hypertension)  Arthropathy: Arthritis  Uncontrolled type 2 diabetes mellitus: Diabetes mellitus type II, uncontrolled  Hyperlipidemia: Hyperlipidemia  H/O heart artery stent    FAMILY HISTORY:    PAST SOCIAL HISTORY:    ALLERGIES & MEDICATIONS  --------------------------------------------------------------------------------  Allergies    No Known Allergies    Intolerances      Standing Inpatient Medications  ascorbic acid 1000 milliGRAM(s) Oral two times a day  atorvastatin 40 milliGRAM(s) Oral at bedtime  chlorhexidine 0.12% Liquid 15 milliLiter(s) Oral Mucosa two times a day  chlorhexidine 4% Liquid 1 Application(s) Topical daily  clopidogrel Tablet 75 milliGRAM(s) Oral daily  guaifenesin/dextromethorphan  Syrup 10 milliLiter(s) Oral every 8 hours  heparin  Injectable 5000 Unit(s) SubCutaneous every 8 hours  insulin lispro (HumaLOG) corrective regimen sliding scale   SubCutaneous three times a day before meals  insulin regular  human recombinant. 7 Unit(s) SubCutaneous once  lactated ringers. 1000 milliLiter(s) IV Continuous <Continuous>  meropenem  IVPB 1000 milliGRAM(s) IV Intermittent every 12 hours  metoprolol tartrate 12.5 milliGRAM(s) Enteral Tube two times a day  metoprolol tartrate Injectable 10 milliGRAM(s) IV Push once  norepinephrine Infusion 0.05 MICROgram(s)/kG/Min IV Continuous <Continuous>  pantoprazole  Injectable 40 milliGRAM(s) IV Push daily  senna 2 Tablet(s) Oral at bedtime  vasopressin Infusion 0.04 Unit(s)/Min IV Continuous <Continuous>  zinc sulfate 220 milliGRAM(s) Oral daily    PRN Inpatient Medications  acetaminophen   Tablet .. 650 milliGRAM(s) Oral every 6 hours PRN        VITALS/PHYSICAL EXAM  --------------------------------------------------------------------------------  T(C): 37.3 (04-11-20 @ 08:00), Max: 37.3 (04-11-20 @ 08:00)  HR: 123 (04-11-20 @ 12:00) (84 - 123)  BP: 118/56 (04-11-20 @ 12:00) (118/56 - 130/62)  RR: 32 (04-11-20 @ 12:00) (30 - 32)  SpO2: 55% (04-11-20 @ 12:00) (55% - 100%)  Wt(kg): --        04-10-20 @ 07:01  -  04-11-20 @ 07:00  --------------------------------------------------------  IN: 3751.4 mL / OUT: 159 mL / NET: 3592.4 mL    04-11-20 @ 07:01  -  04-11-20 @ 13:36  --------------------------------------------------------  IN: 832.5 mL / OUT: 25 mL / NET: 807.5 mL      Physical Exam:  	Gen: intubated/ventilated              on 2 pressors     LABS/STUDIES  --------------------------------------------------------------------------------              8.7    27.89 >-----------<  241      [04-11-20 @ 01:10]              27.2     140  |  102  |  122  ----------------------------<  188      [04-11-20 @ 01:10]  4.9   |  18  |  3.3        Ca     7.4     [04-11-20 @ 01:10]      Mg     2.9     [04-10-20 @ 16:00]      Phos  7.4     [04-10-20 @ 16:00]    TPro  4.8  /  Alb  1.8  /  TBili  0.3  /  DBili  x   /  AST  33  /  ALT  82  /  AlkPhos  161  [04-11-20 @ 01:10]    PT/INR: PT 14.70, INR 1.28       [04-11-20 @ 01:10]  PTT: 37.8       [04-11-20 @ 01:10]          [04-10-20 @ 16:00]        [04-11-20 @ 01:10]    Creatinine Trend:  SCr 3.3 [04-11 @ 01:10]  SCr 3.2 [04-10 @ 16:00]  SCr 2.6 [04-10 @ 00:29]  SCr 2.4 [04-09 @ 16:00]  SCr 1.5 [04-09 @ 00:00]    Urinalysis - [04-11-20 @ 04:36]      Color Yellow / Appearance Slightly Turbid / SG 1.018 / pH 6.0      Gluc Negative / Ketone Trace  / Bili Negative / Urobili <2 mg/dL       Blood Small / Protein 100 mg/dL / Leuk Est Negative / Nitrite Negative      RBC 18 / WBC 14 / Hyaline 5 / Gran  / Sq Epi  / Non Sq Epi 2 / Bacteria Negative  COVID-19 PCR (03.27.20 @ 23:00)    COVID-19 PCR: Detected:        Ferritin 1069      [04-10-20 @ 16:00]  HbA1c 9.9      [03-31-20 @ 05:30]  TSH 2.62      [03-28-20 @ 11:44]  Lipid: chol --, TG 95, HDL --, LDL --      [04-06-20 @ 00:30]      Free Light Chains: kappa 6.13, lambda 5.53, ratio = 1.11      [04-06 @ 00:30]

## 2020-04-11 NOTE — PROGRESS NOTE ADULT - ASSESSMENT
76 yo female with medical hx of HTN, DM II, Hypothyroidism HLD BIBA for evaluation of syncope, Pt also endorses Cough generalized weakness and fever of 101F  for 3-4 days duration, fall at home, PEA with ROSC after son gave CPR    IMPRESSION  # COVID-19 PNA, intubated 3/30, septic shock requiring pressors    Procalcitonin, Serum: 17.70 (04-10-20 @ 16:00)    CXR b/l interstitial opacities   #Cytokine Release Syndrome   D-Dimer Assay, Quantitative: 2673 ng/mL DDU (04-09-20 @ 00:00)  D-Dimer Assay, Quantitative: 2849 ng/mL DDU (04-08-20 @ 00:30)  #QTC Calculation(Bezet) 467 ms  #DM Hemoglobin A1C, Whole Blood: 9.9 (03-31-20 @ 05:30)  - completed 5 days of azithro/plaquenil   MRSA Nasal PCR negative    RECOMMENDATIONS  - SEND BCX  - TLC 3/31- consider d/c in the setting of rising WBC/procal   - s/p Vanc x1 4/10, restart if hemodynamic compromise  - Fatoumata 1g q12h iv   - off steroids  - Supportive care , grave prognosis

## 2020-04-12 NOTE — PROGRESS NOTE ADULT - SUBJECTIVE AND OBJECTIVE BOX
TRICIA SANTIZO  [id number]  75y Female    Indication for ICU admission:  acute respiratory failure COVID-19 positive, pneumonia, HD instability  Admit Date: 3/27  ICU Date:  3/31    No Known Allergies    PAST MEDICAL & SURGICAL HISTORY:  Hypothyroidism: Hypothyroidism  Essential hypertension: HTN (hypertension)  Arthropathy: Arthritis  Uncontrolled type 2 diabetes mellitus: Diabetes mellitus type II, uncontrolled  Hyperlipidemia: Hyperlipidemia  H/O heart artery stent    Home Medications:  glyBURIDE 5 mg oral tablet: 1 tab(s) orally once a day (28 Mar 2020 05:20)  lisinopril 20 mg oral tablet: 1 tab(s) orally once a day (09 May 2018 12:13)  metFORMIN 500 mg oral tablet, extended release: 2 tab(s) orally 2 times a day (28 Mar 2020 05:19)  Plavix 75 mg oral tablet: 1 tab(s) orally once a day (09 May 2018 12:13)  rosuvastatin 20 mg oral tablet: 1 tab(s) orally once a day (28 Mar 2020 05:19)      24HRS EVENT:   Overnight: post HD, acidosis improved PaO2 231, decreased FIO2 to 70%, Hgb downtrending 8.7>7.6, in am PaO2 122, decreased FIO2 to 60%,     NEURO:  No longer on sedation  D/c seroquel  Allow to wake up    RESP:      Acute respiratory failure      Intubated with a 7.5 tube on 3/30      Vent: 400/30/60/15      AM ABG : 7.3, 46, 122, 23 98%, decreased FIO2 70>60%      Daily CXR      d/cd Solumedrol 60q12       Robutssin DM        R Tension PTX, right 28Fr Chest Tube placed to suction    CARDS:      Tachy resolved HR in 80s      Hypotension: On Levo, Vaso,   MAP goal >75      Metoprolol 12.5 BID  - Afib       CAD s/p cardiac stent- continue plavix, not on B-blockers at home      Lopressor,  Lisinopril discontinued      Atorvastatin       EKG :          GI-   TF: restarted trickle TF Glucerna @20cc        PPI w Protonix and Senna for bowel regimen-  last BM 4/10    Renal      lebron, strict i/o's, minimal u/o 5-10cc/h      HD  (-)500cc      Cr uptrending : 1.5>2.4>2.6>3.2       Lytes: Na 141, K 4.7, Mg 2.3, IP 7.0     Daily weight:  95.5        Heme-      Hg 10>9.7>8.7>7.6      On HSQ      SCD      Ddimer 2849>3100     ID-      Continue meropenem 1g Q12      Cefepime completed , Azithromycin and Plaquenil both ended 4/3      Bld cx x2-  3/27 negative      Reactive leukocytosis WBC : 20.12>28.20>27.9>23      COVID -19 + with respiratory failure      Procalcitonin  17>12>40    Endocrine     IDDM on lantus at home (held)     off insulin gtt, RISS      H/O Hypothyroidism not on medications-  TSH 2.2    off solumedrol       DVT PPx: HSQ    GI PPX: PPI     Lines:   L IJ TLC  R IJ Humphrey   R radial A line  Lebron  OGT     Review of systems:    [ ]A ten-point review of systems was otherwise negative except as noted above.  [x ]Due to altered mental status/intubation, subjective information was not attained from the patient.  History was obtained, to the extent possible, from review of the chart and collateral sources of information.     Daily     Diet, NPO with Tube Feed:   Tube Feeding Modality: Orogastric  Glucerna 1.2 Oscar  Total Volume for 24 Hours (mL): 480  Continuous  Starting Tube Feed Rate mL per Hour: 10  Until Goal Tube Feed Rate (mL per Hour): 20  Tube Feed Duration (in Hours): 24  Tube Feed Start Time: 18:00 (20 @ 17:42)      CURRENT MEDS:  Neurologic Medications  acetaminophen   Tablet .. 650 milliGRAM(s) Oral every 6 hours PRN Temp greater or equal to 38C (100.4F)    Respiratory Medications  guaifenesin/dextromethorphan  Syrup 10 milliLiter(s) Oral every 8 hours    Cardiovascular Medications  metoprolol tartrate 12.5 milliGRAM(s) Enteral Tube two times a day  norepinephrine Infusion 0.05 MICROgram(s)/kG/Min IV Continuous <Continuous>    Gastrointestinal Medications  ascorbic acid 1000 milliGRAM(s) Oral two times a day  pantoprazole  Injectable 40 milliGRAM(s) IV Push daily  senna 2 Tablet(s) Oral at bedtime  zinc sulfate 220 milliGRAM(s) Oral daily    Genitourinary Medications    Hematologic/Oncologic Medications  clopidogrel Tablet 75 milliGRAM(s) Oral daily  heparin  Injectable 5000 Unit(s) SubCutaneous every 8 hours    Antimicrobial/Immunologic Medications  meropenem  IVPB 1000 milliGRAM(s) IV Intermittent every 12 hours    Endocrine/Metabolic Medications  atorvastatin 40 milliGRAM(s) Oral at bedtime  insulin lispro (HumaLOG) corrective regimen sliding scale   SubCutaneous three times a day before meals    Topical/Other Medications  chlorhexidine 0.12% Liquid 15 milliLiter(s) Oral Mucosa two times a day  chlorhexidine 4% Liquid 1 Application(s) Topical daily      ICU Vital Signs Last 24 Hrs  T(C): 36.9 (2020 10:13), Max: 38.1 (2020 12:00)  T(F): 98.4 (2020 10:13), Max: 100.5 (2020 12:00)  HR: 78 (2020 10:51) (75 - 123)  BP: 118/56 (2020 12:00) (118/56 - 118/56)  BP(mean): 80 (2020 12:00) (80 - 80)  ABP: 109/50 (2020 10:13) (91/45 - 162/69)  ABP(mean): 82 (2020 06:00) (63 - 82)  RR: 30 (2020 10:13) (30 - 32)  SpO2: 94% (2020 10:51) (94% - 100%)      Mode: AC/ CMV (Assist Control/ Continuous Mandatory Ventilation)  RR (machine): 30  TV (machine): 400  FiO2: 60  PEEP: 12  MAP: 23  PIP: 42    ABG - ( 2020 05:03 )  pH, Arterial: 7.30  pH, Blood: x     /  pCO2: 46    /  pO2: 122   / HCO3: 23    / Base Excess: -3.2  /  SaO2: 98          I&O's Summary    2020 07:01  -  2020 07:00  --------------------------------------------------------  IN: 4218.7 mL / OUT: 590 mL / NET: 3628.7 mL      I&O's Detail    2020 07:01  -  2020 07:00  --------------------------------------------------------  IN:    Glucerna: 160 mL    IV PiggyBack: 50 mL    lactated ringers.: 2400 mL    norepinephrine Infusion: 1526.5 mL    phenylephrine   Infusion: 24.6 mL    vasopressin Infusion: 57.6 mL  Total IN: 4218.7 mL    OUT:    Indwelling Catheter - Urethral: 90 mL    Other: 500 mL  Total OUT: 590 mL    Total NET: 3628.7 mL          PHYSICAL EXAM:    General/Neuro  RASS:     RASS -3 to -4, pupils reactive +gag and corneals  No spontaneous movements          Lungs:   Intubated, vented with equal aair entry bilaterally    Cardiovascular : S1, S2.  Regular rate and rhythm.  4+ Peripheral edema   Cardiac Rhythm: Normal Sinus Rhythm    GI: Abdomen soft, Non-tender, Non-distended.    José Miguel-ogastric tube in place.  .      Extremities: Extremities cool peripherally    Derm: Good skin turgor, no skin breakdown.      :       Lebron catheter in place. UO 0-5ml/hr      CXR: Bilateral opacities, right CT in position, PTX resolved    LABS:  CAPILLARY BLOOD GLUCOSE  127-207                            7.6    23.56 )-----------( 228      ( 2020 00:45 )             23.7       04-12    141  |  102  |  87<HH>  ----------------------------<  153<H>  4.7   |  21  |  3.0<H>    Ca    7.4<L>      2020 00:42  Phos  7.0     04-12  Mg     2.3     04-12    TPro  4.4<L>  /  Alb  1.6<L>  /  TBili  0.3  /  DBili  x   /  AST  107<H>  /  ALT  137<H>  /  AlkPhos  192<H>  04-12      PT/INR - ( 2020 01:10 )   PT: 14.70 sec;   INR: 1.28 ratio         PTT - ( 2020 01:10 )  PTT:37.8 sec  CARDIAC MARKERS ( 10 Apr 2020 16:00 )  x     / x     / 178 U/L / x     / x          Urinalysis Basic - ( 2020 04:36 )    Color: Yellow / Appearance: Slightly Turbid / S.018 / pH: x  Gluc: x / Ketone: Trace  / Bili: Negative / Urobili: <2 mg/dL   Blood: x / Protein: 100 mg/dL / Nitrite: Negative   Leuk Esterase: Negative / RBC: 18 /HPF / WBC 14 /HPF   Sq Epi: x / Non Sq Epi: 2 /HPF / Bacteria: Negative

## 2020-04-12 NOTE — PROCEDURE NOTE - NSPOSTCAREGUIDE_GEN_A_CORE
Care for catheter as per unit/ICU protocols
Verbal/written post procedure instructions were given to patient/caregiver
Verbal/written post procedure instructions were given to patient/caregiver/Instructed patient/caregiver regarding signs and symptoms of infection/Instructed patient/caregiver to follow-up with primary care physician/Keep the cast/splint/dressing clean and dry/Care for catheter as per unit/ICU protocols
Care for catheter as per unit/ICU protocols
Verbal/written post procedure instructions were given to patient/caregiver/Care for catheter as per unit/ICU protocols/Instructed patient/caregiver to follow-up with primary care physician/Keep the cast/splint/dressing clean and dry/Instructed patient/caregiver regarding signs and symptoms of infection

## 2020-04-12 NOTE — PROCEDURE NOTE - NSPOSTPRCRAD_GEN_A_CORE
chest radiograph/line adjusted to depth of insertion/ultrasound/line in appropriate postion
L IJ/central line located in the
central line located in the/line adjusted to depth of insertion/post-procedure radiography performed/no pneumothorax/central line located in the superior vena cava/depth of insertion

## 2020-04-12 NOTE — PROCEDURE NOTE - PROCEDURE DATE TIME, MLM
01-Apr-2020 16:00
12-Apr-2020 13:38
11-Apr-2020 14:17
11-Apr-2020 15:12
31-Mar-2020 05:11
31-Mar-2020 05:15

## 2020-04-12 NOTE — PROGRESS NOTE ADULT - ASSESSMENT
NEURO:  Off sedation since 4/8, seroquel since 4/11  RASS -3 to -4  allow to wake up     RESP:      Acute respiratory failure      Intubated with a 7.5 tube on 3/30      on /30/60/12- no changes today      Wean vent as tolerated      Daily CXR      Hillary DM     CARDS:     new onset of afib RVR, converted back to NSR, continue metoprolol 12.5 BID, no need for AC      CAD s/p cardiac stent- continue Plavix  not on B-blockers at home      Levophed titrate to MAP >75, discontinue Vasopressin      Lisinopril at home, discontinued     elevated troponins, no longer trending         GI-        TF; Glucerna 20 ml/hr  via OGT- change fees to nepro       PPI w Protonix and Senna for bowel regimen       Held Senna and lactulose 2/2 multiple loose watery BMs    Renal-      ALIYAH S/P HD 4/11 with 500 ml taken off      Hyperphosphatemia- continue to monitor      Add renagel      IVL      lebron in place, strict i&o      Twice daily electrolytes      monitor replete elytes prn      Heme-      Anemia- rpt H&H @1600- transfuse as needed if HGB <7, plavix      On HSQ, Plavix      SCD      no AC     ID-      Monitor for temps      Meropenem 1g q12 hr - change to renal dosing 500 mg q12h. total 10 day course(4/20)      WBC 20 ->28K. Meropenem for abx and 1 dose of vancomycin      Discontinue Left IJ TLC and give another dose VAncomycin      COVID -19 + with respiratory failure      Procalcitonin 17      Completed courses of Azithromycin and Plaquenil on 4/3,      Continue Cefepime q12 until 4/9    Endocrine     IDDM on lantus at home     On insulin gtt per protocol     H/O Hypothyroidism not on medications-  TSH 2.2     Dc steroids NEURO:  Off sedation since 4/8, seroquel since 4/11  RASS -3 to -4  allow to wake up     RESP:      Acute respiratory failure      Intubated with a 7.5 tube on 3/30      on /30/60/12- no changes today      Wean vent as tolerated      Daily CXR      Hillary DM     CARDS:     new onset of afib RVR, converted back to NSR, continue metoprolol 12.5 BID, no need for AC      CAD s/p cardiac stent- continue Plavix  not on B-blockers at home      Levophed titrate to MAP >75, discontinue Vasopressin      Lisinopril at home, discontinued     elevated troponins, no longer trending         GI-        TF; Glucerna 20 ml/hr  via OGT- change feeds to nepro       PPI w Protonix and Senna for bowel regimen       Held Senna and lactulose 2/2 multiple loose watery BMs    Renal-      ALIYAH S/P HD 4/11 with 500 ml taken off      Hyperphosphatemia- continue to monitor      Add renagel      IVL      lebron in place, strict i&o      Twice daily electrolytes      monitor replete elytes prn      Heme-      Anemia- rpt H&H @1600- transfuse as needed if HGB <7, plavix      On HSQ, Plavix      SCD      no AC     ID-      Monitor for temps      Meropenem 1g q12 hr - change to renal dosing 500 mg q12h. total 10 day course(4/20)      WBC 20 ->28K. Meropenem for abx and 1 dose of vancomycin      Discontinue Left IJ TLC and give another dose VAncomycin      COVID -19 + with respiratory failure      Procalcitonin 17      Completed courses of Azithromycin and Plaquenil on 4/3,      Continue Cefepime q12 until 4/9    Endocrine     IDDM on lantus at home     On insulin gtt per protocol     H/O Hypothyroidism not on medications-  TSH 2.2     Dc steroids

## 2020-04-12 NOTE — PROCEDURE NOTE - NSPROCDETAILS_GEN_ALL_CORE
location identified, draped/prepped, sterile technique used/sterile dressing applied/sterile technique, catheter placed/supine position/ultrasound guidance/ultrasound assessment
connected to a pressurized flush line/all materials/supplies accounted for at end of procedure/location identified, draped/prepped, sterile technique used, needle inserted/introduced/ultrasound guidance/positive blood return obtained via catheter/sutured in place
lumen(s) aspirated and flushed/sterile dressing applied/sterile technique, catheter placed/ultrasound guidance/guidewire recovered
secured in place/supine position
sterile technique, catheter placed/guidewire recovered/lumen(s) aspirated and flushed/sterile dressing applied/ultrasound guidance
location identified, draped/prepped, sterile technique used, needle inserted/introduced/all materials/supplies accounted for at end of procedure/hemostasis with direct pressure, dressing applied/ultrasound guidance/sutured in place

## 2020-04-12 NOTE — PROGRESS NOTE ADULT - SUBJECTIVE AND OBJECTIVE BOX
intubated/ventilated  s/p hd yesterday         PAST HISTORY  --------------------------------------------------------------------------------  No significant changes to PMH, PSH, FHx, SHx, unless otherwise noted    ALLERGIES & MEDICATIONS  --------------------------------------------------------------------------------  Allergies    No Known Allergies    Intolerances      Standing Inpatient Medications  ascorbic acid 1000 milliGRAM(s) Oral two times a day  atorvastatin 40 milliGRAM(s) Oral at bedtime  chlorhexidine 0.12% Liquid 15 milliLiter(s) Oral Mucosa two times a day  chlorhexidine 4% Liquid 1 Application(s) Topical daily  clopidogrel Tablet 75 milliGRAM(s) Oral daily  guaifenesin/dextromethorphan  Syrup 10 milliLiter(s) Oral every 8 hours  heparin  Injectable 5000 Unit(s) SubCutaneous every 8 hours  insulin lispro (HumaLOG) corrective regimen sliding scale   SubCutaneous three times a day before meals  lactated ringers. 1000 milliLiter(s) IV Continuous <Continuous>  meropenem  IVPB 1000 milliGRAM(s) IV Intermittent every 12 hours  metoprolol tartrate 12.5 milliGRAM(s) Enteral Tube two times a day  norepinephrine Infusion 0.05 MICROgram(s)/kG/Min IV Continuous <Continuous>  pantoprazole  Injectable 40 milliGRAM(s) IV Push daily  senna 2 Tablet(s) Oral at bedtime  vasopressin Infusion 0.04 Unit(s)/Min IV Continuous <Continuous>  zinc sulfate 220 milliGRAM(s) Oral daily    PRN Inpatient Medications  acetaminophen   Tablet .. 650 milliGRAM(s) Oral every 6 hours PRN        VITALS/PHYSICAL EXAM  --------------------------------------------------------------------------------  T(C): 37.2 (04-12-20 @ 00:00), Max: 38.1 (04-11-20 @ 12:00)  HR: 85 (04-12-20 @ 02:00) (83 - 123)  BP: 118/56 (04-11-20 @ 12:00) (118/56 - 129/60)  RR: 30 (04-12-20 @ 02:00) (30 - 32)  SpO2: 99% (04-12-20 @ 02:00) (96% - 100%)  Wt(kg): --        04-10-20 @ 07:01  -  04-11-20 @ 07:00  --------------------------------------------------------  IN: 3751.4 mL / OUT: 159 mL / NET: 3592.4 mL    04-11-20 @ 07:01  -  04-12-20 @ 05:13  --------------------------------------------------------  IN: 3957.5 mL / OUT: 585 mL / NET: 3372.5 mL      Physical Exam:  	Gen: intubated/ventilated  	Vascular access: juanita     LABS/STUDIES  --------------------------------------------------------------------------------              7.6    23.56 >-----------<  228      [04-12-20 @ 00:45]              23.7     141  |  102  |  87  ----------------------------<  153      [04-12-20 @ 00:42]  4.7   |  21  |  3.0        Ca     7.4     [04-12-20 @ 00:42]      Mg     2.3     [04-12-20 @ 00:44]      Phos  7.0     [04-12-20 @ 00:44]    TPro  4.4  /  Alb  1.6  /  TBili  0.3  /  DBili  x   /  AST  107  /  ALT  137  /  AlkPhos  192  [04-12-20 @ 00:42]    PT/INR: PT 14.70, INR 1.28       [04-11-20 @ 01:10]  PTT: 37.8       [04-11-20 @ 01:10]          [04-10-20 @ 16:00]        [04-11-20 @ 01:10]    Creatinine Trend:  SCr 3.0 [04-12 @ 00:42]  SCr 3.6 [04-11 @ 18:26]  SCr 3.3 [04-11 @ 01:10]  SCr 3.2 [04-10 @ 16:00]  SCr 2.6 [04-10 @ 00:29]    Urinalysis - [04-11-20 @ 04:36]      Color Yellow / Appearance Slightly Turbid / SG 1.018 / pH 6.0      Gluc Negative / Ketone Trace  / Bili Negative / Urobili <2 mg/dL       Blood Small / Protein 100 mg/dL / Leuk Est Negative / Nitrite Negative      RBC 18 / WBC 14 / Hyaline 5 / Gran  / Sq Epi  / Non Sq Epi 2 / Bacteria Negative      Ferritin 1152      [04-11-20 @ 01:10]  HbA1c 9.9      [03-31-20 @ 05:30]  TSH 2.62      [03-28-20 @ 11:44]  Lipid: chol --, TG 95, HDL --, LDL --      [04-06-20 @ 00:30]      Free Light Chains: kappa 6.13, lambda 5.53, ratio = 1.11      [04-06 @ 00:30]

## 2020-04-12 NOTE — PROGRESS NOTE ADULT - ASSESSMENT
ALIYAH/ respiratory failure/ viral pneumonia/ COVID positive/ sepsis :  # anuric   # ALIYAH/ ATN in nature  # on 2 pressors  # d/c LR   # decrease emilia too 500 q 24  # d/c vit c  # change feed to nepro, ph noted  #s/p hd yesterday with 500 cc fluid removal  # pneumothorax s/p CT placement   # ph noted, renagel 2/2/2  # overall prognosis poor  # cased discussed with SICU team   # will follow

## 2020-04-12 NOTE — CHART NOTE - NSCHARTNOTEFT_GEN_A_CORE
spoke with son to provide him update on patient's condition  -aminta rubalcava, [id number], psychiatry resident

## 2020-04-12 NOTE — PROCEDURE NOTE - NSINDICATIONS_GEN_A_CORE
venous access/antibiotic therapy
monitoring purposes/arterial puncture to obtain ABG's/critical patient/blood sampling
critical illness
critical illness/dialysis/CRRT/emergency venous access/venous access
pneumothorax
critical patient

## 2020-04-13 NOTE — PROGRESS NOTE ADULT - SUBJECTIVE AND OBJECTIVE BOX
24 h events noted  intubated/ventilated       PAST HISTORY  --------------------------------------------------------------------------------  No significant changes to PMH, PSH, FHx, SHx, unless otherwise noted    ALLERGIES & MEDICATIONS  --------------------------------------------------------------------------------  Allergies    No Known Allergies    Intolerances      Standing Inpatient Medications  atorvastatin 40 milliGRAM(s) Oral at bedtime  chlorhexidine 0.12% Liquid 15 milliLiter(s) Oral Mucosa two times a day  chlorhexidine 4% Liquid 1 Application(s) Topical daily  clopidogrel Tablet 75 milliGRAM(s) Oral daily  guaifenesin/dextromethorphan  Syrup 10 milliLiter(s) Oral every 8 hours  heparin  Injectable 5000 Unit(s) SubCutaneous every 8 hours  insulin regular Infusion 1 Unit(s)/Hr IV Continuous <Continuous>  meropenem  IVPB 500 milliGRAM(s) IV Intermittent every 12 hours  metoprolol tartrate 12.5 milliGRAM(s) Enteral Tube two times a day  norepinephrine Infusion 0.05 MICROgram(s)/kG/Min IV Continuous <Continuous>  pantoprazole  Injectable 40 milliGRAM(s) IV Push daily  sevelamer carbonate Powder 1600 milliGRAM(s) Oral three times a day with meals  zinc sulfate 220 milliGRAM(s) Oral daily    PRN Inpatient Medications  acetaminophen   Tablet .. 650 milliGRAM(s) Oral every 6 hours PRN      VITALS/PHYSICAL EXAM  --------------------------------------------------------------------------------  T(C): 36.4 (04-13-20 @ 04:00), Max: 37 (04-12-20 @ 14:38)  HR: 93 (04-13-20 @ 06:00) (68 - 97)  BP: --  RR: 25 (04-13-20 @ 06:00) (19 - 30)  SpO2: 98% (04-13-20 @ 06:00) (94% - 98%)  Wt(kg): --        04-12-20 @ 07:01  -  04-13-20 @ 07:00  --------------------------------------------------------  IN: 1566.2 mL / OUT: 680 mL / NET: 886.2 mL      Physical Exam:  	Gen: intubated/ventilated  	Vascular access: udall     LABS/STUDIES  --------------------------------------------------------------------------------              8.2    26.95 >-----------<  259      [04-13-20 @ 02:00]              24.1     142  |  102  |  103  ----------------------------<  182      [04-13-20 @ 02:00]  4.9   |  20  |  3.5        Ca     7.2     [04-13-20 @ 02:00]      Mg     2.5     [04-13-20 @ 02:00]      Phos  8.4     [04-13-20 @ 02:00]    TPro  4.7  /  Alb  1.7  /  TBili  0.3  /  DBili  x   /  AST  54  /  ALT  101  /  AlkPhos  204  [04-13-20 @ 02:00]    PT/INR: PT 12.30, INR 1.07       [04-13-20 @ 02:00]  PTT: 29.4       [04-13-20 @ 02:00]      Creatinine Trend:  SCr 3.5 [04-13 @ 02:00]  SCr 3.2 [04-12 @ 17:18]  SCr 3.0 [04-12 @ 00:42]  SCr 3.6 [04-11 @ 18:26]  SCr 3.3 [04-11 @ 01:10]    Urinalysis - [04-11-20 @ 04:36]      Color Yellow / Appearance Slightly Turbid / SG 1.018 / pH 6.0      Gluc Negative / Ketone Trace  / Bili Negative / Urobili <2 mg/dL       Blood Small / Protein 100 mg/dL / Leuk Est Negative / Nitrite Negative      RBC 18 / WBC 14 / Hyaline 5 / Gran  / Sq Epi  / Non Sq Epi 2 / Bacteria Negative      Ferritin 1152      [04-11-20 @ 01:10]  HbA1c 9.9      [03-31-20 @ 05:30]  TSH 2.62      [03-28-20 @ 11:44]  Lipid: chol --, TG 95, HDL --, LDL --      [04-06-20 @ 00:30]      Free Light Chains: kappa 6.13, lambda 5.53, ratio = 1.11      [04-06 @ 00:30]

## 2020-04-13 NOTE — PROGRESS NOTE ADULT - SUBJECTIVE AND OBJECTIVE BOX
TRICIA SANTIZO  [id number]  75y Female    Indication for ICU admission:  acute respiratory failure COVID-19 positive, pneumonia, HD instability  Admit Date: 3/27  ICU Date:  3/31    No Known Allergies    PAST MEDICAL & SURGICAL HISTORY:  Hypothyroidism: Hypothyroidism  Essential hypertension: HTN (hypertension)  Arthropathy: Arthritis  Uncontrolled type 2 diabetes mellitus: Diabetes mellitus type II, uncontrolled  Hyperlipidemia: Hyperlipidemia  H/O heart artery stent    Home Medications:  glyBURIDE 5 mg oral tablet: 1 tab(s) orally once a day (28 Mar 2020 05:20)  lisinopril 20 mg oral tablet: 1 tab(s) orally once a day (09 May 2018 12:13)  metFORMIN 500 mg oral tablet, extended release: 2 tab(s) orally 2 times a day (28 Mar 2020 05:19)  Plavix 75 mg oral tablet: 1 tab(s) orally once a day (09 May 2018 12:13)  rosuvastatin 20 mg oral tablet: 1 tab(s) orally once a day (28 Mar 2020 05:19)      24HRS EVENT:   Overnight: afebrile, levo at .06, abd distended TF held and placed to suction, 300 dark brown output from NGT, RUE noted with redness, induration, warm- sono ordered, insulin gtt restarted for FS in 200, CT op 1000, afebrile, UO 0-5 cc     NEURO:  Off all sedation.  RASS -3: Grimaces to deep abdominal pressure.    RESP:      Acute respiratory failure      Intubated with a 7.5tube on 3/30      Vent: Bi-level Phi 35, P low 0 fio2 80 thigh 4.8        Plat 38 peak 37       AM ABG  7.24/50/74/22/lac 1.7       Daily CXR: Unchanged since yesterday.      completed course of solumedrol 4/8      Robutssin DM       4/11 R Tension PTX, right 28Fr Chest Tube placed to suction- no air leak op 1000 overnight serous     CARDS:      Tachy resolved HR in mid 90s-100 on      Hypotension: On Levo .75     vaso discontinued, Goal MAP >75      Metoprolol 12.5 BID  - Afib       CAD s/p cardiac stent- continue plavix, not on B-blockers at home        Lisinopril discontinued      Atorvastatin      4/13 EKG :          GI-   TF:  Nepro, running at 30 ml/hr - held overnight 2/2 abd distension, placed on suction 300 dark brown op. Will continue to hold TF at this time.       PPI w Protonix and Senna for bowel regimen-  last BM 4/13     Renal      lebron, strict i/o's,  -- 0-5 cc overnight       HD 4/11 (-)500cc. Will get dialysis today with plan to take off 2L      BUN//3.5 ( from 95/3.2)       Daily weight: 4/12 95.5, 4/13 97.6kg        Heme-      Hg &.3 from 7.6, no transfusions, has new T&S 4/12-- H/H today 8.2/24.1       On HSQ      SCD      Ddimer 2849>3100     ID-      Continue meropenem 1g Q12*** gets vanco w/ Dialysis f/u vanco level. Will order 1g vanco after dialysis today.      Completed courses of Cefepime, Plaqueenil and Azithromycin      Bld cx x2-  3/27 negative. Pending 2nd set of blood cultures. Left IJ TLC taken out.      Urine cx grew coagulase negative staphylococcus.      Reactive leukocytosis WBC : 20.12>28.20>27.9>23 WBC today 26.9 from 21       COVID -19 + with respiratory failure      Procalcitonin  17>12>40    Endocrine     IDDM on lantus at home (held)     insulin gtt restarted overnight fro fs in 200 --> currently on 1      H/O Hypothyroidism not on medications-  TSH 2.2     off solumedrol       DVT PPx: HSQ    GI PPX: PPI     Lines:   Left brachial PICC 4/12  R IJ [place]   R radial A line    [place]  OGT     Review of systems:    [ ]A ten-point review of systems was otherwise negative except as noted above.  [x ]Due to altered mental status/intubation, subjective information was not attained from the patient.  History was obtained, to the extent possible, from review of the chart and collateral sources of information.    Daily     Daily     Diet, NPO with Tube Feed:   Tube Feeding Modality: Orogastric  Nepro with Carb Steady  Total Volume for 24 Hours (mL): 720  Continuous  Starting Tube Feed Rate mL per Hour: 30  Until Goal Tube Feed Rate (mL per Hour): 30  Tube Feed Duration (in Hours): 24  Tube Feed Start Time: 12:00 (04-12-20 @ 12:02)      CURRENT MEDS:  Neurologic Medications  acetaminophen   Tablet .. 650 milliGRAM(s) Oral every 6 hours PRN Temp greater or equal to 38C (100.4F)    Respiratory Medications  guaifenesin/dextromethorphan  Syrup 10 milliLiter(s) Oral every 8 hours    Cardiovascular Medications  metoprolol tartrate 12.5 milliGRAM(s) Enteral Tube two times a day  norepinephrine Infusion 0.05 MICROgram(s)/kG/Min IV Continuous <Continuous>    Gastrointestinal Medications  pantoprazole  Injectable 40 milliGRAM(s) IV Push daily  zinc sulfate 220 milliGRAM(s) Oral daily    Genitourinary Medications    Hematologic/Oncologic Medications  clopidogrel Tablet 75 milliGRAM(s) Oral daily  heparin  Injectable 5000 Unit(s) SubCutaneous every 8 hours    Antimicrobial/Immunologic Medications  meropenem  IVPB 500 milliGRAM(s) IV Intermittent every 12 hours    Endocrine/Metabolic Medications  atorvastatin 40 milliGRAM(s) Oral at bedtime  insulin regular Infusion 1 Unit(s)/Hr IV Continuous <Continuous>    Topical/Other Medications  chlorhexidine 0.12% Liquid 15 milliLiter(s) Oral Mucosa two times a day  chlorhexidine 0.12% Liquid 15 milliLiter(s) Oral Mucosa every 12 hours  chlorhexidine 4% Liquid 1 Application(s) Topical daily  sevelamer carbonate Powder 1600 milliGRAM(s) Oral three times a day with meals      ICU Vital Signs Last 24 Hrs  T(C): 36.8 (13 Apr 2020 08:00), Max: 37 (12 Apr 2020 14:38)  T(F): 98.3 (13 Apr 2020 08:00), Max: 98.6 (12 Apr 2020 14:38)  HR: 90 (13 Apr 2020 10:00) (68 - 97)  BP: --  BP(mean): --  ABP: 141/49 (13 Apr 2020 10:00) (92/42 - 163/50)  ABP(mean): 80 (13 Apr 2020 10:00) (58 - 83)  RR: 10 (13 Apr 2020 10:00) (10 - 25)  SpO2: 97% (13 Apr 2020 10:00) (96% - 98%)      Adult Advanced Hemodynamics Last 24 Hrs  CVP(mm Hg): --  CVP(cm H2O): --  CO: --  CI: --  PA: --  PA(mean): --  PCWP: --  SVR: --  SVRI: --  PVR: --  PVRI: --    Mode: BiLevel  RR (machine): 10  FiO2: 80  PS: 15  ITime: 1  MAP: 26  P-High: 35  T-High: 4.8  PIP: 38    ABG - ( 13 Apr 2020 05:20 )  pH, Arterial: 7.24  pH, Blood: x     /  pCO2: 50    /  pO2: 74    / HCO3: 22    / Base Excess: -5.7  /  SaO2: 93                  I&O's Summary    12 Apr 2020 07:01  -  13 Apr 2020 07:00  --------------------------------------------------------  IN: 1566.2 mL / OUT: 680 mL / NET: 886.2 mL    13 Apr 2020 07:01  -  13 Apr 2020 11:32  --------------------------------------------------------  IN: 172.6 mL / OUT: 0 mL / NET: 172.6 mL      I&O's Detail    12 Apr 2020 07:01  -  13 Apr 2020 07:00  --------------------------------------------------------  IN:    Enteral Tube Flush: 60 mL    Glucerna: 40 mL    insulin regular Infusion: 23 mL    lactated ringers.: 900 mL    norepinephrine Infusion: 516.8 mL    vasopressin Infusion: 26.4 mL  Total IN: 1566.2 mL    OUT:    Chest Tube: 600 mL    Indwelling Catheter - Urethral: 80 mL  Total OUT: 680 mL    Total NET: 886.2 mL      13 Apr 2020 07:01  -  13 Apr 2020 11:32  --------------------------------------------------------  IN:    insulin regular Infusion: 1 mL    norepinephrine Infusion: 171.6 mL  Total IN: 172.6 mL    OUT:  Total OUT: 0 mL    Total NET: 172.6 mL          PHYSICAL EXAM:    General/Neuro  RASS:  -3  Deficits: Sedated.   Pupils: 2mm b/l, respond to light.    Lungs: Intubated.     clear to auscultation.    Cardiovascular : S1, S2.  Regular rate and rhythm.  Peripheral edema   Cardiac Rhythm: Normal Sinus Rhythm    GI: Abdomen soft, Non-tender. CURRENTLY NOT DISTENDED    Extremities: Extremities warm, pink, well-perfused. Pulses:Rt DP 1+    Lt DP 1+    Derm: Good skin turgor, no skin breakdown.      :       Lebron catheter in place.      CXR: Stable b/l infiltrates. Right chest tube in place.     LABS:  CAPILLARY BLOOD GLUCOSE      POCT Blood Glucose.: 171 mg/dL (13 Apr 2020 10:27)  POCT Blood Glucose.: 135 mg/dL (13 Apr 2020 09:13)  POCT Blood Glucose.: 133 mg/dL (13 Apr 2020 08:14)  POCT Blood Glucose.: 134 mg/dL (13 Apr 2020 05:45)  POCT Blood Glucose.: 192 mg/dL (13 Apr 2020 02:13)  POCT Blood Glucose.: 185 mg/dL (13 Apr 2020 00:54)  POCT Blood Glucose.: 233 mg/dL (12 Apr 2020 22:00)  POCT Blood Glucose.: 217 mg/dL (12 Apr 2020 21:06)  POCT Blood Glucose.: 179 mg/dL (12 Apr 2020 16:03)                          8.2    26.95 )-----------( 259      ( 13 Apr 2020 02:00 )             24.1       04-13    142  |  102  |  103<HH>  ----------------------------<  182<H>  4.9   |  20  |  3.5<H>    Ca    7.2<L>      13 Apr 2020 02:00  Phos  8.4     04-13  Mg     2.5     04-13    TPro  4.7<L>  /  Alb  1.7<L>  /  TBili  0.3  /  DBili  x   /  AST  54<H>  /  ALT  101<H>  /  AlkPhos  204<H>  04-13      PT/INR - ( 13 Apr 2020 02:00 )   PT: 12.30 sec;   INR: 1.07 ratio         PTT - ( 13 Apr 2020 02:00 )  PTT:29.4 sec          Culture - Blood (collected 11 Apr 2020 16:38)  Source: .Blood Blood  Preliminary Report (12 Apr 2020 23:01):    No growth to date.    Culture - Blood (collected 11 Apr 2020 16:38)  Source: .Blood Blood  Preliminary Report (12 Apr 2020 23:01):    No growth to date.    Culture - Urine (collected 11 Apr 2020 04:36)  Source: .Urine Catheterized  Final Report (12 Apr 2020 18:24):    10,000 - 49,000 CFU/mL Coag Negative Staphylococcus

## 2020-04-13 NOTE — PROGRESS NOTE ADULT - ASSESSMENT
74 yo female with medical hx of HTN, DM II, Hypothyroidism HLD BIBA for evaluation of syncope, Pt also endorses Cough generalized weakness and fever of 101F  for 3-4 days duration, fall at home, PEA with ROSC after son gave CPR    IMPRESSION  # COVID-19 PNA, intubated 3/30, septic shock requiring pressors    Procalcitonin, Serum: 17.70 (04-10-20 @ 16:00)    CXR b/l interstitial opacities   #Cytokine Release Syndrome   D-Dimer Assay, Quantitative: 2673 ng/mL DDU (04-09-20 @ 00:00)  D-Dimer Assay, Quantitative: 2849 ng/mL DDU (04-08-20 @ 00:30)  #QTC Calculation(Bezet) 467 ms  #DM Hemoglobin A1C, Whole Blood: 9.9 (03-31-20 @ 05:30)  - completed 5 days of azithro/plaquenil   MRSA Nasal PCR negative  - off steroids    - T(F): , Max: 98.6   - WBC Count: 26.95 K/uL (higher)    On meropenem  IVPB 500 milliGRAM(s) IV Intermittent every 12 hours  4/11 B/C x2 negative    RECOMMENDATIONS  - TLC 3/31- consider d/c in the setting of rising WBC/procal   - s/p Vanc x1 4/10, restart if hemodynamic compromise  Continue  Fatoumata 1g q12h iv   - Supportive care , grave prognosis

## 2020-04-13 NOTE — CHART NOTE - NSCHARTNOTESELECT_GEN_ALL_CORE
dietitian f/u/Event Note Detail Level: Simple Detail Level: Generalized Bactrim Pregnancy And Lactation Text: This medication is Pregnancy Category D and is known to cause fetal risk.  It is also excreted in breast milk. Minocycline Pregnancy And Lactation Text: This medication is Pregnancy Category D and not consider safe during pregnancy. It is also excreted in breast milk. Benzoyl Peroxide Counseling: Patient counseled that medicine may cause skin irritation and bleach clothing.  In the event of skin irritation, the patient was advised to reduce the amount of the drug applied or use it less frequently.   The patient verbalized understanding of the proper use and possible adverse effects of benzoyl peroxide.  All of the patient's questions and concerns were addressed. Doxycycline Counseling:  Patient counseled regarding possible photosensitivity and increased risk for sunburn.  Patient instructed to avoid sunlight, if possible.  When exposed to sunlight, patients should wear protective clothing, sunglasses, and sunscreen.  The patient was instructed to call the office immediately if the following severe adverse effects occur:  hearing changes, easy bruising/bleeding, severe headache, or vision changes.  The patient verbalized understanding of the proper use and possible adverse effects of doxycycline.  All of the patient's questions and concerns were addressed. Isotretinoin Pregnancy And Lactation Text: This medication is Pregnancy Category X and is considered extremely dangerous during pregnancy. It is unknown if it is excreted in breast milk. Tazorac Pregnancy And Lactation Text: This medication is not safe during pregnancy. It is unknown if this medication is excreted in breast milk. Spironolactone Counseling: Patient advised regarding risks of diarrhea, abdominal pain, hyperkalemia, birth defects (for female patients), liver toxicity and renal toxicity. The patient may need blood work to monitor liver and kidney function and potassium levels while on therapy. The patient verbalized understanding of the proper use and possible adverse effects of spironolactone.  All of the patient's questions and concerns were addressed. Birth Control Pills Counseling: Birth Control Pill Counseling: I discussed with the patient the potential side effects of OCPs including but not limited to increased risk of stroke, heart attack, thrombophlebitis, deep venous thrombosis, hepatic adenomas, breast changes, GI upset, headaches, and depression.  The patient verbalized understanding of the proper use and possible adverse effects of OCPs. All of the patient's questions and concerns were addressed. Benzoyl Peroxide Pregnancy And Lactation Text: This medication is Pregnancy Category C. It is unknown if benzoyl peroxide is excreted in breast milk. Include Pregnancy/Lactation Warning?: No Doxycycline Pregnancy And Lactation Text: This medication is Pregnancy Category D and not consider safe during pregnancy. It is also excreted in breast milk but is considered safe for shorter treatment courses. Detail Level: Detailed Azithromycin Counseling:  I discussed with the patient the risks of azithromycin including but not limited to GI upset, allergic reaction, drug rash, diarrhea, and yeast infections. High Dose Vitamin A Counseling: Side effects reviewed, pt to contact office should one occur. Topical Clindamycin Counseling: Patient counseled that this medication may cause skin irritation or allergic reactions.  In the event of skin irritation, the patient was advised to reduce the amount of the drug applied or use it less frequently.   The patient verbalized understanding of the proper use and possible adverse effects of clindamycin.  All of the patient's questions and concerns were addressed. Birth Control Pills Pregnancy And Lactation Text: This medication should be avoided if pregnant and for the first 30 days post-partum. Spironolactone Pregnancy And Lactation Text: This medication can cause feminization of the male fetus and should be avoided during pregnancy. The active metabolite is also found in breast milk. Erythromycin Counseling:  I discussed with the patient the risks of erythromycin including but not limited to GI upset, allergic reaction, drug rash, diarrhea, increase in liver enzymes, and yeast infections. Topical Retinoid counseling:  Patient advised to apply a pea-sized amount only at bedtime and wait 30 minutes after washing their face before applying.  If too drying, patient may add a non-comedogenic moisturizer. The patient verbalized understanding of the proper use and possible adverse effects of retinoids.  All of the patient's questions and concerns were addressed. High Dose Vitamin A Pregnancy And Lactation Text: High dose vitamin A therapy is contraindicated during pregnancy and breast feeding. Azithromycin Pregnancy And Lactation Text: This medication is considered safe during pregnancy and is also secreted in breast milk. Topical Clindamycin Pregnancy And Lactation Text: This medication is Pregnancy Category B and is considered safe during pregnancy. It is unknown if it is excreted in breast milk. Tetracycline Counseling: Patient counseled regarding possible photosensitivity and increased risk for sunburn.  Patient instructed to avoid sunlight, if possible.  When exposed to sunlight, patients should wear protective clothing, sunglasses, and sunscreen.  The patient was instructed to call the office immediately if the following severe adverse effects occur:  hearing changes, easy bruising/bleeding, severe headache, or vision changes.  The patient verbalized understanding of the proper use and possible adverse effects of tetracycline.  All of the patient's questions and concerns were addressed. Patient understands to avoid pregnancy while on therapy due to potential birth defects. Dapsone Counseling: I discussed with the patient the risks of dapsone including but not limited to hemolytic anemia, agranulocytosis, rashes, methemoglobinemia, kidney failure, peripheral neuropathy, headaches, GI upset, and liver toxicity.  Patients who start dapsone require monitoring including baseline LFTs and weekly CBCs for the first month, then every month thereafter.  The patient verbalized understanding of the proper use and possible adverse effects of dapsone.  All of the patient's questions and concerns were addressed. Erythromycin Pregnancy And Lactation Text: This medication is Pregnancy Category B and is considered safe during pregnancy. It is also excreted in breast milk. Topical Retinoid Pregnancy And Lactation Text: This medication is Pregnancy Category C. It is unknown if this medication is excreted in breast milk. Minocycline Counseling: Patient advised regarding possible photosensitivity and discoloration of the teeth, skin, lips, tongue and gums.  Patient instructed to avoid sunlight, if possible.  When exposed to sunlight, patients should wear protective clothing, sunglasses, and sunscreen.  The patient was instructed to call the office immediately if the following severe adverse effects occur:  hearing changes, easy bruising/bleeding, severe headache, or vision changes.  The patient verbalized understanding of the proper use and possible adverse effects of minocycline.  All of the patient's questions and concerns were addressed. Topical Sulfur Applications Counseling: Topical Sulfur Counseling: Patient counseled that this medication may cause skin irritation or allergic reactions.  In the event of skin irritation, the patient was advised to reduce the amount of the drug applied or use it less frequently.   The patient verbalized understanding of the proper use and possible adverse effects of topical sulfur application.  All of the patient's questions and concerns were addressed. Bactrim Counseling:  I discussed with the patient the risks of sulfa antibiotics including but not limited to GI upset, allergic reaction, drug rash, diarrhea, dizziness, photosensitivity, and yeast infections.  Rarely, more serious reactions can occur including but not limited to aplastic anemia, agranulocytosis, methemoglobinemia, blood dyscrasias, liver or kidney failure, lung infiltrates or desquamative/blistering drug rashes. Dapsone Pregnancy And Lactation Text: This medication is Pregnancy Category C and is not considered safe during pregnancy or breast feeding. Isotretinoin Counseling: Patient should get monthly blood tests, not donate blood, not drive at night if vision affected, not share medication, and not undergo elective surgery for 6 months after tx completed. Side effects reviewed, pt to contact office should one occur. Tazorac Counseling:  Patient advised that medication is irritating and drying.  Patient may need to apply sparingly and wash off after an hour before eventually leaving it on overnight.  The patient verbalized understanding of the proper use and possible adverse effects of tazorac.  All of the patient's questions and concerns were addressed. Topical Sulfur Applications Pregnancy And Lactation Text: This medication is Pregnancy Category C and has an unknown safety profile during pregnancy. It is unknown if this topical medication is excreted in breast milk.

## 2020-04-13 NOTE — PROGRESS NOTE ADULT - ASSESSMENT
NEURO:  Off sedation since 4/8, seroquel since 4/11  RASS -3 to -4  allow to wake up     RESP:      Acute respiratory failure      Intubated with a 7.5 tube on 3/30      on bilevel - Bi-level Phi 35, P low 0 fio2 80 thigh 4.8        Plat 38 peak 37       Wean vent as tolerated      Daily CXR      Hillary DM    CARDS:     new onset of afib RVR, converted back to NSR, continue metoprolol 12.5 BID, no need for AC      CAD s/p cardiac stent- continue Plavix  not on B-blockers at home      Levophed titrate to MAP >75, discontinue Vasopressin      Lisinopril at home, discontinued     elevated troponins, no longer trending         GI-        TF stopped due to abdominal distension noted overnight. Currently not distended.       PPI w Protonix and Senna for bowel regimen       Watery BM last night.    Renal-      ALIYAH S/P HD 4/11 with 500 ml taken off. Will get HD today.      Hyperphosphatemia- continue to monitor      Add renagel      IVL      lebron in place, strict i&o      Twice daily electrolytes      monitor replete elytes prn      Heme-      Anemia- rpt H&H @1600- transfuse as needed if HGB <7, plavix      On HSQ, Plavix      SCD      no AC     ID-      Monitor for temps      Meropenem 1g q12 hr - change to renal dosing 500 mg q12h. total 10 day course(4/20)      WBC 20 ->28K. Meropenem for abx and 1 dose of vancomycin      Discontinue Left IJ TLC and give another dose vancomycin      1 dose of vancomycin today after HD      COVID -19 + with respiratory failure      Procalcitonin 17      Completed courses of Azithromycin and Plaquenil on 4/3,      Continue Cefepime q12 until 4/9    Endocrine     IDDM on lantus at home     On insulin gtt per protocol     H/O Hypothyroidism not on medications-  TSH 2.2     Dc steroids

## 2020-04-13 NOTE — CHART NOTE - NSCHARTNOTEFT_GEN_A_CORE
Registered Dietitian Follow-Up     Patient Profile Reviewed                           Yes [x]   No []     Nutrition History Previously Obtained        Yes []  No [x]  intubated     Pertinent Subjective Information: intubated, off sedation, on pressors. TF held d/t abdominal distention last night. Pt TF also changed to Nepro yesterday.     Pertinent Medical Interventions: Acute respiratory failure- COVID -19 +. Off sedation. Hypotensive on levophed. Pt holding TF, placed on suction, on bowel regimen. Pt to be dialyzed today.     Diet order: Nepro 30ml/h- 1296kcal, 58g protein, 526ml free water     Anthropometrics:  - Ht.  157.48cm  - Wt.   (4/1): 68kg  (4/3): 85.3kg  (4/5): 88.3kg   (4/9): 87.3 kg   (4/10) 85.3kg  (4/11) 92.3kg -- fluid retention vs bed scale error, to use lowest wt 68kg- appears consistent with previous wt x1 year ago ~70kg  - BMI.  27.4  - IBW. 50kg+/-10%     Pertinent Lab Data: (4/9)13) H/H 8.2/24.1, , Cr 3.5, s gluc 182, elevated LFTs eGFR 14, Mg 2.5, PO4 8.4     Pertinent Meds: heparin, plavix, abx, insulin, levophed, renvela, lopressor, protonix, zinc sulfate, lipitor     Physical Findings:  - Appearance: intubated, edema 2+ generalized  - GI function: LBM 4/13- abdomen distended  - Tubes: endotrach, OGT  - Oral/Mouth cavity: NPO  - Skin: stg 2 buttocks      Nutrition Requirements  Weight Used: 68kg cont from RD last f/u      ESTIMATED ENERGY NEEDS:       ~1310kcal ([id number])  ESTIMATED PROTEIN NEEDS:          g/day (1.2-1.5 g/kg of ABW)  ESTIMATED FLUID NEEDS:              fluid per SICU team     Nutrient Intake: 99% of est energy needs and 72% est protein needs        [] Previous Nutrition Diagnosis:  inadequate protein-energy intake (JUST protein)            [x] Ongoing          [] Resolved    [] No active nutrition diagnosis identified at this time       Nutrition Intervention EN     Goal/Expected Outcome: pt to meet and tolerate >85% of estimated kcal/protein via TF upon f/u in 3 days.     Indicator/Monitoring: RD to monitor energy intake diet order, body comp, NFPF, glucose profile     Recommendation: Nepro is not necessary at present- only PO4 and Mg are elevated. RD would recommend Peptamen AF which is 1.2kcal/ml, lower fiber, lower fat- compared to Nepro (1.8kcal/ml).  Peptamen AF @40ml/h with Beneprotein packets x3/day. Provides (including beneprotein) 1227kcal, 90g protein, 778ml free water, 1600mg K+, 800mg PO4, 320mg Mg+.   Maintain all aspiration precautions, do not feed if MAP <65.    Pt at risk f/u 3 days. reviewed with covering resident [id number] Registered Dietitian Follow-Up     Patient Profile Reviewed                           Yes [x]   No []     Nutrition History Previously Obtained        Yes []  No [x]  intubated     Pertinent Subjective Information: intubated, off sedation, on pressors. TF held d/t abdominal distention last night. Pt TF also changed to Nepro yesterday.     Pertinent Medical Interventions: Acute respiratory failure- COVID -19 +. Off sedation. Hypotensive on levophed. Pt holding TF, placed on suction, on bowel regimen. Pt to be dialyzed today. MAP 80. Note MAP dropped to 58 last night.     Diet order: Nepro 30ml/h- 1296kcal, 58g protein, 526ml free water     Anthropometrics:  - Ht.  157.48cm  - Wt.   (4/1): 68kg  (4/3): 85.3kg  (4/5): 88.3kg   (4/9): 87.3 kg   (4/10) 85.3kg  (4/11) 92.3kg -- fluid retention vs bed scale error, to use lowest wt 68kg- appears consistent with previous wt x1 year ago ~70kg  - BMI.  27.4  - IBW. 50kg+/-10%     Pertinent Lab Data: (4/9)13) H/H 8.2/24.1, , Cr 3.5, s gluc 182, elevated LFTs eGFR 14, Mg 2.5, PO4 8.4     Pertinent Meds: heparin, plavix, abx, insulin, levophed, renvela, lopressor, protonix, zinc sulfate, lipitor     Physical Findings:  - Appearance: intubated, edema 2+ generalized  - GI function: LBM 4/13- abdomen distended  - Tubes: endotrach, OGT  - Oral/Mouth cavity: NPO  - Skin: stg 2 buttocks      Nutrition Requirements  Weight Used: 68kg cont from RD last f/u      ESTIMATED ENERGY NEEDS:       ~1310kcal ([id number]) Ve 8.08. Tmax 37.0  ESTIMATED PROTEIN NEEDS:          g/day (1.2-1.5 g/kg of ABW)  ESTIMATED FLUID NEEDS:              fluid per SICU team     Nutrient Intake: 99% of est energy needs and 72% est protein needs        [] Previous Nutrition Diagnosis:  inadequate protein-energy intake (JUST protein)            [x] Ongoing          [] Resolved    [] No active nutrition diagnosis identified at this time       Nutrition Intervention EN     Goal/Expected Outcome: pt to meet and tolerate >85% of estimated kcal/protein via TF upon f/u in 3 days.     Indicator/Monitoring: RD to monitor energy intake diet order, body comp, NFPF, glucose profile     Recommendation: Nepro is not necessary at present- only PO4 and Mg are elevated. RD would recommend Peptamen AF which is 1.2kcal/ml, lower fiber, lower fat- compared to Nepro (1.8kcal/ml).  Peptamen AF @40ml/h with Beneprotein packets x3/day. Provides (including beneprotein) 1227kcal, 90g protein, 778ml free water, 1600mg K+, 800mg PO4, 320mg Mg+.   Maintain all aspiration precautions, do not feed if MAP <65.    Pt at risk f/u 3 days. reviewed with covering resident [id number] Registered Dietitian Follow-Up     Patient Profile Reviewed                           Yes [x]   No []     Nutrition History Previously Obtained        Yes []  No [x]  intubated     Pertinent Subjective Information: intubated, off sedation, on pressors. TF held d/t abdominal distention last night. Pt TF also changed to Nepro yesterday.     Pertinent Medical Interventions: Acute respiratory failure- COVID -19 +. Off sedation. Hypotensive on levophed. Pt holding TF, placed on suction, on bowel regimen. Pt to be dialyzed today. MAP 80. Note MAP dropped to 58 last night.     Diet order: Nepro 30ml/h- 1296kcal, 58g protein, 526ml free water     Anthropometrics:  - Ht.  157.48cm  - Wt.   (4/1): 68kg  (4/3): 85.3kg  (4/5): 88.3kg   (4/9): 87.3 kg   (4/10) 85.3kg  (4/11) 92.3kg -- fluid retention vs bed scale error, to use lowest wt 68kg- appears consistent with previous wt x1 year ago ~70kg  - BMI.  27.4  - IBW. 50kg+/-10%     Pertinent Lab Data: (4/9)13) H/H 8.2/24.1, , Cr 3.5, s gluc 182, elevated LFTs eGFR 14, Mg 2.5, PO4 8.4     Pertinent Meds: heparin, plavix, abx, insulin, levophed, renvela, lopressor, protonix, zinc sulfate, lipitor     Physical Findings:  - Appearance: intubated, edema 2+ generalized  - GI function: LBM 4/13- abdomen distended overnight, currently not distended  - Tubes: endotrach, OGT  - Oral/Mouth cavity: NPO  - Skin: stg 2 buttocks      Nutrition Requirements  Weight Used: 68kg cont from RD last f/u      ESTIMATED ENERGY NEEDS:       ~1310kcal ([id number]) Ve 8.08. Tmax 37.0  ESTIMATED PROTEIN NEEDS:          g/day (1.2-1.5 g/kg of ABW)  ESTIMATED FLUID NEEDS:              fluid per SICU team     Nutrient Intake: 99% of est energy needs and 72% est protein needs        [] Previous Nutrition Diagnosis:  inadequate protein-energy intake (JUST protein)            [x] Ongoing          [] Resolved    [] No active nutrition diagnosis identified at this time       Nutrition Intervention EN     Goal/Expected Outcome: pt to meet and tolerate >85% of estimated kcal/protein via TF upon f/u in 3 days.     Indicator/Monitoring: RD to monitor energy intake diet order, body comp, NFPF, glucose profile     Recommendation: Nepro is not necessary at present- only PO4 and Mg are elevated. RD would recommend Peptamen AF which is 1.2kcal/ml, lower fiber, lower fat- compared to Nepro (1.8kcal/ml).  Peptamen AF @40ml/h with Beneprotein packets x3/day. Provides (including beneprotein) 1227kcal, 90g protein, 778ml free water, 1600mg K+, 800mg PO4, 320mg Mg+.   Maintain all aspiration precautions, do not feed if MAP <65.    Pt at risk f/u 3 days. reviewed with covering resident [id number]

## 2020-04-13 NOTE — PROGRESS NOTE ADULT - SUBJECTIVE AND OBJECTIVE BOX
TRICIA SANTIZO  75y, Female  Allergy: No Known Allergies      CHIEF COMPLAINT: Syncope, Pneumonia R/O COVID (13 Apr 2020 11:11)      INTERVAL EVENTS/HPI  - No acute events overnight  - T(F): , Max: 98.6 (04-12-20 @ 14:38)  - Tolerating medication  - WBC Count: 26.95 K/uL (04-13-20 @ 02:00)      ROS  unable to obtain history secondary to patient's mental status and/or sedation     FH non-contributory   Social Hx non-contributory    VITALS:  T(F): 98.3, Max: 98.6 (04-12-20 @ 14:38)  HR: 126  BP: --  RR: 27Vital Signs Last 24 Hrs  T(C): 36.8 (13 Apr 2020 08:00), Max: 37 (12 Apr 2020 14:38)  T(F): 98.3 (13 Apr 2020 08:00), Max: 98.6 (12 Apr 2020 14:38)  HR: 126 (13 Apr 2020 13:00) (68 - 126)  BP: --  BP(mean): --  RR: 27 (13 Apr 2020 13:00) (10 - 27)  SpO2: 91% (13 Apr 2020 13:00) (90% - 98%)    PHYSICAL EXAM:  see below       TESTS & MEASUREMENTS:                        8.2    26.95 )-----------( 259      ( 13 Apr 2020 02:00 )             24.1     04-13    142  |  102  |  103<HH>  ----------------------------<  182<H>  4.9   |  20  |  3.5<H>    Ca    7.2<L>      13 Apr 2020 02:00  Phos  8.4     04-13  Mg     2.5     04-13    TPro  4.7<L>  /  Alb  1.7<L>  /  TBili  0.3  /  DBili  x   /  AST  54<H>  /  ALT  101<H>  /  AlkPhos  204<H>  04-13    eGFR if Non African American: 12 mL/min/1.73M2 (04-13-20 @ 02:00)  eGFR if : 14 mL/min/1.73M2 (04-13-20 @ 02:00)  eGFR if Non African American: 14 mL/min/1.73M2 (04-12-20 @ 17:18)  eGFR if African American: 16 mL/min/1.73M2 (04-12-20 @ 17:18)    LIVER FUNCTIONS - ( 13 Apr 2020 02:00 )  Alb: 1.7 g/dL / Pro: 4.7 g/dL / ALK PHOS: 204 U/L / ALT: 101 U/L / AST: 54 U/L / GGT: x               Culture - Blood (collected 04-11-20 @ 16:38)  Source: .Blood Blood  Preliminary Report (04-12-20 @ 23:01):    No growth to date.    Culture - Blood (collected 04-11-20 @ 16:38)  Source: .Blood Blood  Preliminary Report (04-12-20 @ 23:01):    No growth to date.    Culture - Urine (collected 04-11-20 @ 04:36)  Source: .Urine Catheterized  Final Report (04-12-20 @ 18:24):    10,000 - 49,000 CFU/mL Coag Negative Staphylococcus        Lactate, Blood: 2.7 mmol/L (04-09-20 @ 00:00)      INFECTIOUS DISEASES TESTING  MRSA PCR Result.: Negative (04-03-20 @ 13:20)      RADIOLOGY & ADDITIONAL TESTS:  I have personally reviewed the last Chest xray  CXR  Xray Chest 1 View- PORTABLE-Urgent:   EXAM:  XR CHEST PORTABLE URGENT 1V            PROCEDURE DATE:  04/12/2020            INTERPRETATION:  Clinical History / Reason for exam: Respiratory distress    Comparison : Chest radiograph 4/12/2020.    Technique/Positioning: Frontal.    Findings:    Support devices: ET tube mid trachea NG tube in stomach central venous line superior vena cava    Cardiac/mediastinum/hilum: Unremarkable.    Lung parenchyma/Pleura: No change bilateral opacities. Small right apical pneumothorax unchanged. No pleural effusion.    Skeleton/soft tissues: Subcutaneous emphysema unchanged    Impression:      No change bilateral opacities. Small right apical pneumothorax unchanged. No pleural effusion.                      PHOENIX TODD M.D., ATTENDING RADIOLOGIST  This document has been electronically signed. Apr 12 2020  2:52PM             (04-12-20 @ 14:30)  Xray Chest 1 View- PORTABLE-Urgent:   EXAM:  XR CHEST PORTABLE URGENT 1V            PROCEDURE DATE:  04/11/2020            INTERPRETATION:  Clinical History / Reason for exam: Post right IJ [place] placement    Comparison : Chest radiograph same day exam at 5:44 AM.    Technique/Positioning: AP.    Findings:    Support devices: An endotracheal tube is seen with tip 2.2 cm above the flex. An enteric tube is seen with tip projecting under the diaphragm. A right IJ ureteral catheter is seen with tip in the SVC. A left IJ central venous catheter is seen with tip in the SVC.    Cardiac/mediastinum/hilum: Unremarkable.    Lung parenchyma/Pleura: New right pneumothorax with air gap measuring 4.4 cm. Bilateral airspace opacities, worsened.    Skeleton/soft tissues: Stable.    Impression:      New right pneumothorax with air gap measuring 4.4 cm.    Bilateral airspace opacities, mildly worsened.    Lines and tubes, appropriate positions.                      KARI TEJADA M.D., ATTENDING RADIOLOGIST  This document has been electronically signed. Apr 12 2020  8:37AM             (04-11-20 @ 16:20)      CT      CARDIOLOGY TESTING  12 Lead ECG:   Systolic  mmHg    Diastolic BP 53 mmHg    Ventricular Rate 93 BPM    Atrial Rate 93 BPM    P-R Interval 154 ms    QRS Duration 68 ms    Q-T Interval 350 ms    QTC Calculation(Bezet) 435 ms    P Axis 68 degrees    R Axis 41 degrees    T Axis 70 degrees    Diagnosis Line Normal sinus rhythm  Low voltage QRS  Poor R wave progression  Abnormal ECG    Confirmed by Marilyn Garcia MD ([id number]) on 4/13/2020 10:41:28 AM (04-13-20 @ 06:22)  12 Lead ECG:   Systolic BP 98 mmHg    Diastolic BP 43 mmHg    Ventricular Rate 120 BPM    Atrial Rate 120 BPM    P-R Interval 172 ms    QRS Duration 92 ms    Q-T Interval 306 ms    QTC Calculation(Bezet) 432 ms    P Axis 72 degrees    R Axis 47 degrees    T Axis 84 degrees    Diagnosis Line Sinus tachycardia  Low voltage QRS  Borderline ECG    Confirmed by RAMYA SILVA MD [id number]) on 4/11/2020 2:50:13 PM (04-11-20 @ 11:46)      MEDICATIONS  atorvastatin 40  chlorhexidine 0.12% Liquid 15  chlorhexidine 0.12% Liquid 15  chlorhexidine 4% Liquid 1  clopidogrel Tablet 75  guaifenesin/dextromethorphan  Syrup 10  heparin  Injectable 5000  insulin regular Infusion 1  meropenem  IVPB 500  metoprolol tartrate 12.5  norepinephrine Infusion 0.05  pantoprazole  Injectable 40  sevelamer carbonate Powder 1600  zinc sulfate 220      ANTIBIOTICS:  meropenem  IVPB 500 milliGRAM(s) IV Intermittent every 12 hours      All available historical data has been reviewed

## 2020-04-13 NOTE — PROGRESS NOTE ADULT - ASSESSMENT
ALIYAH/ respiratory failure/ viral pneumonia/ COVID positive/ sepsis :  # anuric   # ALIYAH/ ATN in nature  # on pressors  # change feed to nepro, ph noted  #hd today, standard bath, uf 1 liter   # pneumothorax s/p CT placement   # ph noted, renagel 2/2/2  # overall prognosis poor  # will follow

## 2020-04-14 NOTE — PROGRESS NOTE ADULT - SUBJECTIVE AND OBJECTIVE BOX
TRICIA SANTIZO  [id number]  75y Female    Indication for ICU admission:  acute respiratory failure COVID-19 positive, pneumonia, HD instability  Admit Date: 3/27  ICU Date:  3/31    No Known Allergies    PAST MEDICAL & SURGICAL HISTORY:  Hypothyroidism: Hypothyroidism  Essential hypertension: HTN (hypertension)  Arthropathy: Arthritis  Uncontrolled type 2 diabetes mellitus: Diabetes mellitus type II, uncontrolled  Hyperlipidemia: Hyperlipidemia  H/O heart artery stent    Home Medications:  glyBURIDE 5 mg oral tablet: 1 tab(s) orally once a day (28 Mar 2020 05:20)  lisinopril 20 mg oral tablet: 1 tab(s) orally once a day (09 May 2018 12:13)  metFORMIN 500 mg oral tablet, extended release: 2 tab(s) orally 2 times a day (28 Mar 2020 05:19)  Plavix 75 mg oral tablet: 1 tab(s) orally once a day (09 May 2018 12:13)  rosuvastatin 20 mg oral tablet: 1 tab(s) orally once a day (28 Mar 2020 05:19)      24HRS EVENT: abd softer then previous exams still distended, ogt remains on suction 125 cc billious looking op overnight, back in afib w/rvr to rate 130s recieved 2.5 lopressor x2, then converted to sinus tach  levophed increased to 1.5 to maintain MAP 75. [place] dressing changed noted to be saturated with op from NGT, hypothermic to 96 bear hugger placed, remains on inulin gtt . got 1g cagluc  weight 90kg. RUE rash marked      NEURO:  Off all sedation, mild facial expressions to pain - overbreathes the vent  RASS -3, responds to pain    RESP:      Acute respiratory failure      Intubated with a 7.5tube on 3/30      Vent: Bi-level Phi 35, P low 0 fio2 80 thigh 4.8        PIP: 38   Plat: 37       AM ABG  7.27/50.5/88.7/23.5 lac 1.8       Daily CXR: Right apical PTX. Right chest tube in place. Pneumomediastinum.      CT with 1200 cc of serous output. Pleurovac changed yesterday with similar amount of output.      completed course of solumedrol       Robutssin DM        R Tension PTX, right 28Fr Chest Tube placed to suction    CARDS:      Hypotension: On Levo 1.1    vaso scontinued, Goal MAP >75      Afib w/ RVR on to 130s      Metoprolol 12.5 BID  - Afib,lopressor 2.5 x2        CAD s/p cardiac stent- continue plavix, not on B-blockers at home        Lisinopril discontinued      Atorvastatin       EKG: NSR, qtc 384    GI-   TF: Restarting nepro  Abd soft and non tender, not distended  Last night OGT on suction 125 cc billious op  abd softer still minimally distended, no guarding noted overnight to palpation   PPI w Protonix and Senna for bowel regimen-    last BM      Renal      lebron, strict i/o's,  -- 0-2cc overnight       HD  1L removed       BUN/CR 70/2.8 ( 60/2.4)     K 4.6 Mag 2.3 Phos 6.7      ionized ca decreased-> 1g ca gluc overnight      Daily weight:  95.5,  90 kg         Heme-      Hg &.3 from 7.6, no transfusions, has new T&S -- H/H today 8.3/24.2      On HSQ      SCD      Ddimer 2849>3100>3572( )      Fibrinogen >700       ID-      Continue meropenem 1g Q12, consider calling ID to add vanco as pt is HD compromised       Completed courses of Cefepime, Plaqueenil and Azithromycin      Bld cx x2-   ngtd      Ucx  : coag neg staph       Reactive leukocytosis WBC : 22.7 from 26       Hypothermic overnight to 96--> bear hugger       lactate1.8    Endocrine     IDDM on lantus at home (held)     insulin gtt at 1      H/O Hypothyroidism not on medications-  TSH 2.2     off solumedrol       DVT PPx: HSQ    GI PPX: PPI     Lines:   Left brachial PICC   R IJ [place]   R radial A line    Lebron  OGT     Review of systems:    [ ]A ten-point review of systems was otherwise negative except as noted above.  [x ]Due to altered mental status/intubation, subjective information was not attained from the patient.  History was obtained, to the extent possible, from review of the chart and collateral sources of information.    Daily     Daily Weight in k (2020 05:00)    Diet, NPO with Tube Feed:   Tube Feeding Modality: Orogastric  Nepro with Carb Steady  Total Volume for 24 Hours (mL): 720  Continuous  Starting Tube Feed Rate mL per Hour: 30  Until Goal Tube Feed Rate (mL per Hour): 30  Tube Feed Duration (in Hours): 24  Tube Feed Start Time: 09:30 (20 @ 09:09)      CURRENT MEDS:  Neurologic Medications  acetaminophen   Tablet .. 650 milliGRAM(s) Oral every 6 hours PRN Temp greater or equal to 38C (100.4F)    Respiratory Medications  guaifenesin/dextromethorphan  Syrup 10 milliLiter(s) Oral every 8 hours    Cardiovascular Medications  metoprolol tartrate 12.5 milliGRAM(s) Enteral Tube two times a day  norepinephrine Infusion 0.05 MICROgram(s)/kG/Min IV Continuous <Continuous>    Gastrointestinal Medications  pantoprazole  Injectable 40 milliGRAM(s) IV Push daily  senna 2 Tablet(s) Oral at bedtime  sodium chloride 0.9%. 1000 milliLiter(s) IV Continuous <Continuous>  zinc sulfate 220 milliGRAM(s) Oral daily    Genitourinary Medications    Hematologic/Oncologic Medications  clopidogrel Tablet 75 milliGRAM(s) Oral daily  heparin  Injectable 5000 Unit(s) SubCutaneous every 8 hours    Antimicrobial/Immunologic Medications  meropenem  IVPB 500 milliGRAM(s) IV Intermittent every 12 hours    Endocrine/Metabolic Medications  atorvastatin 40 milliGRAM(s) Oral at bedtime  insulin regular Infusion 1 Unit(s)/Hr IV Continuous <Continuous>    Topical/Other Medications  chlorhexidine 0.12% Liquid 15 milliLiter(s) Oral Mucosa two times a day  chlorhexidine 0.12% Liquid 15 milliLiter(s) Oral Mucosa every 12 hours  chlorhexidine 4% Liquid 1 Application(s) Topical daily  sevelamer carbonate Powder 1600 milliGRAM(s) Oral three times a day with meals      ICU Vital Signs Last 24 Hrs  T(C): 36.2 (2020 08:00), Max: 37.3 (2020 05:00)  T(F): 97.1 (2020 08:00), Max: 99.2 (2020 05:00)  HR: 95 (2020 10:00) (84 - 128)  BP: --  BP(mean): --  ABP: 147/50 (2020 10:00) (102/42 - 165/49)  ABP(mean): 80 (2020 10:00) (67 - 91)  RR: 10 (2020 10:00) (10 - 27)  SpO2: 100% (2020 10:00) (90% - 100%)      Adult Advanced Hemodynamics Last 24 Hrs  CVP(mm Hg): --  CVP(cm H2O): --  CO: --  CI: --  PA: --  PA(mean): --  PCWP: --  SVR: --  SVRI: --  PVR: --  PVRI: --    Mode: BiLevel/ BiVent  RR (machine): 10  FiO2: 100  PS: 15  ITime: 1  MAP: 24  P-High: 35  P-Low: 0  T-High: 4.8  PIP: 38    ABG - ( 2020 01:53 )  pH, Arterial: 7.28  pH, Blood: x     /  pCO2: 51    /  pO2: 89    / HCO3: 24    / Base Excess: -3.5  /  SaO2: 95                  I&O's Summary    2020 07:  -  2020 07:00  --------------------------------------------------------  IN: 1968.8 mL / OUT: 3849 mL / NET: -1880.2 mL    2020 07:  -  2020 10:50  --------------------------------------------------------  IN: 303 mL / OUT: 200 mL / NET: 103 mL      I&O's Detail    2020 07:  -  2020 07:00  --------------------------------------------------------  IN:    insulin regular Infusion: 25 mL    IV PiggyBack: 100 mL    norepinephrine Infusion: 1843.8 mL  Total IN: 1968.8 mL    OUT:    Chest Tube: 1665 mL    Indwelling Catheter - Urethral: 4 mL    Nasoenteral Tube: 680 mL    Other: 1500 mL  Total OUT: 3849 mL    Total NET: -1880.2 mL      2020 07:01  -  2020 10:50  --------------------------------------------------------  IN:    Glucerna: 30 mL    insulin regular Infusion: 3 mL    norepinephrine Infusion: 240 mL    sodium chloride 0.9%.: 30 mL  Total IN: 303 mL    OUT:    Chest Tube: 200 mL  Total OUT: 200 mL    Total NET: 103 mL          PHYSICAL EXAM:    General/Neuro  RASS: -3  Deficits:  Sedated  Pupils: 2mm b/l, respond to light    Lungs:  Intubated. B/l equal breath sounds. Right 28 Fr CT in place to suction.    Cardiovascular : S1, S2.  Regular rate and rhythm.  Peripheral edema   Cardiac Rhythm: Normal Sinus Rhythm    GI: Abdomen soft, Non-tender, Non-distended. OG Tube in place.    Extremities: Extremities warm, pink, well-perfused. Pulses:Rt DP 1+ Lt DP 1+    Derm: Good skin turgor, no skin breakdown.      :       [place] catheter in place.      CXR: Right apical PTX. Pneumomediastinum. B/l infiltrates worsened since yesterday.    LABS:  CAPILLARY BLOOD GLUCOSE      POCT Blood Glucose.: 89 mg/dL (2020 10:07)  POCT Blood Glucose.: 123 mg/dL (2020 08:11)  POCT Blood Glucose.: 141 mg/dL (2020 05:13)  POCT Blood Glucose.: 153 mg/dL (2020 03:03)  POCT Blood Glucose.: 165 mg/dL (2020 01:24)  POCT Blood Glucose.: 134 mg/dL (2020 23:47)  POCT Blood Glucose.: 120 mg/dL (2020 22:37)  POCT Blood Glucose.: 127 mg/dL (2020 21:21)  POCT Blood Glucose.: 153 mg/dL (2020 19:11)  POCT Blood Glucose.: 174 mg/dL (2020 15:56)  POCT Blood Glucose.: 183 mg/dL (2020 13:53)                          8.3    22.27 )-----------( 211      ( 2020 00:25 )             24.2       04-14    143  |  103  |  70<HH>  ----------------------------<  136<H>  4.6   |  22  |  2.8<H>    Ca    7.3<L>      2020 00:25  Phos  6.7     -14  Mg     2.3     -14    TPro  4.7<L>  /  Alb  1.8<L>  /  TBili  0.4  /  DBili  x   /  AST  48<H>  /  ALT  87<H>  /  AlkPhos  238<H>  04-14      PT/INR - ( 2020 01:10 )   PT: 13.10 sec;   INR: 1.14 ratio         PTT - ( 2020 01:10 )  PTT:37.7 sec  CARDIAC MARKERS ( 2020 16:12 )  x     / x     / 103 U/L / x     / x              Culture - Blood (collected 2020 16:38)  Source: .Blood Blood  Preliminary Report (2020 23:01):    No growth to date.    Culture - Blood (collected 2020 16:38)  Source: .Blood Blood  Preliminary Report (2020 23:01):    No growth to date.

## 2020-04-14 NOTE — PROGRESS NOTE ADULT - SUBJECTIVE AND OBJECTIVE BOX
DARLYNTRICIA  75y, Female  Allergy: No Known Allergies      LOS  17d    CHIEF COMPLAINT: Syncope, Pneumonia  R/O COVID (14 Apr 2020 10:43)      INTERVAL EVENTS/HPI  - T(F): , Max: 99.2 (04-14-20 @ 05:00)  - WBC Count: 22.27 (04-14-20 @ 00:25) downtrending   WBC Count: 26.95 (04-13-20 @ 02:00)   - Creatinine, Serum: 2.8 (04-14-20 @ 00:25) uptrending   Creatinine, Serum: 2.4 (04-13-20 @ 16:12)       ROS  cannot obtain secondary to patient's sedation and/or mental status    VITALS:  T(F): 98, Max: 99.2 (04-14-20 @ 05:00)  HR: 109  BP: --  RR: 10Vital Signs Last 24 Hrs  T(C): 36.7 (14 Apr 2020 12:00), Max: 37.3 (14 Apr 2020 05:00)  T(F): 98 (14 Apr 2020 12:00), Max: 99.2 (14 Apr 2020 05:00)  HR: 109 (14 Apr 2020 14:00) (84 - 124)  BP: --  BP(mean): --  RR: 10 (14 Apr 2020 14:00) (10 - 27)  SpO2: 96% (14 Apr 2020 14:00) (96% - 100%)    PHYSICAL EXAM:  Gen: Intubated  CV: RRR  Lungs: Bilateral chest expansion  Neuro: Sedated  Lines    FH: Non-contributory  Social Hx: Non-contributory    TESTS & MEASUREMENTS:                        8.3    22.27 )-----------( 211      ( 14 Apr 2020 00:25 )             24.2     04-14    143  |  103  |  70<HH>  ----------------------------<  136<H>  4.6   |  22  |  2.8<H>    Ca    7.3<L>      14 Apr 2020 00:25  Phos  6.7     04-14  Mg     2.3     04-14    TPro  4.7<L>  /  Alb  1.8<L>  /  TBili  0.4  /  DBili  x   /  AST  48<H>  /  ALT  87<H>  /  AlkPhos  238<H>  04-14    eGFR if Non African American: 16 mL/min/1.73M2 (04-14-20 @ 00:25)  eGFR if : 19 mL/min/1.73M2 (04-14-20 @ 00:25)  eGFR if Non African American: 19 mL/min/1.73M2 (04-13-20 @ 16:12)  eGFR if African American: 22 mL/min/1.73M2 (04-13-20 @ 16:12)    LIVER FUNCTIONS - ( 14 Apr 2020 00:25 )  Alb: 1.8 g/dL / Pro: 4.7 g/dL / ALK PHOS: 238 U/L / ALT: 87 U/L / AST: 48 U/L / GGT: x               Culture - Blood (collected 04-11-20 @ 16:38)  Source: .Blood Blood  Preliminary Report (04-12-20 @ 23:01):    No growth to date.    Culture - Blood (collected 04-11-20 @ 16:38)  Source: .Blood Blood  Preliminary Report (04-12-20 @ 23:01):    No growth to date.    Culture - Urine (collected 04-11-20 @ 04:36)  Source: .Urine Catheterized  Final Report (04-12-20 @ 18:24):    10,000 - 49,000 CFU/mL Coag Negative Staphylococcus    Culture - Blood (collected 03-27-20 @ 23:00)  Source: .Blood Blood-Peripheral  Final Report (04-01-20 @ 07:00):    No Growth Final    Culture - Blood (collected 03-27-20 @ 23:00)  Source: .Blood Blood-Peripheral  Final Report (04-01-20 @ 07:00):    No Growth Final        Lactate, Blood: 1.6 mmol/L (04-14-20 @ 00:25)      INFECTIOUS DISEASES TESTING  Procalcitonin, Serum: 40.70 (04-11-20 @ 18:26)  Procalcitonin, Serum: 12.20 (04-11-20 @ 01:10)  Procalcitonin, Serum: 17.70 (04-10-20 @ 16:00)  Procalcitonin, Serum: 11.10 (04-10-20 @ 00:29)  Procalcitonin, Serum: 0.51 (04-09-20 @ 00:00)  Procalcitonin, Serum: 0.42 (04-08-20 @ 00:30)  Procalcitonin, Serum: 1.69 (04-04-20 @ 17:10)  MRSA PCR Result.: Negative (04-03-20 @ 13:20)  Procalcitonin, Serum: 3.23 (04-03-20 @ 01:50)  Procalcitonin, Serum: 3.66 (04-01-20 @ 23:30)  Procalcitonin, Serum: 1.25 (03-31-20 @ 17:51)  Procalcitonin, Serum: 2.13 (03-31-20 @ 05:30)  Procalcitonin, Serum: 0.48 (03-29-20 @ 06:13)  Procalcitonin, Serum: 0.48 (03-28-20 @ 11:44)  COVID-19 PCR: Detected (03-27-20 @ 23:00)  Flu B Result: Negative (03-27-20 @ 23:00)  RSV Result: Negative (03-27-20 @ 23:00)  Flu A Result: Negative (03-27-20 @ 23:00)      INFLAMMATORY MARKERS  C-Reactive Protein, Serum: 31.72 mg/dL (04-11-20 @ 01:10)  C-Reactive Protein, Serum: 29.87 mg/dL (04-10-20 @ 16:00)  Sedimentation Rate, Erythrocyte: 126 mm/Hr (04-10-20 @ 00:29)  Sedimentation Rate, Erythrocyte: 100 mm/Hr (04-09-20 @ 00:00)      RADIOLOGY & ADDITIONAL TESTS:  I have personally reviewed the last available Chest xray  CXR  Xray Chest 1 View- PORTABLE-Urgent:   EXAM:  XR CHEST PORTABLE URGENT 1V            PROCEDURE DATE:  04/12/2020            INTERPRETATION:  Clinical History / Reason for exam: Respiratory distress    Comparison : Chest radiograph 4/12/2020.    Technique/Positioning: Frontal.    Findings:    Support devices: ET tube mid trachea NG tube in stomach central venous line superior vena cava    Cardiac/mediastinum/hilum: Unremarkable.    Lung parenchyma/Pleura: No change bilateral opacities. Small right apical pneumothorax unchanged. No pleural effusion.    Skeleton/soft tissues: Subcutaneous emphysema unchanged    Impression:      No change bilateral opacities. Small right apical pneumothorax unchanged. No pleural effusion.                      PHOENIX TODD M.D., ATTENDING RADIOLOGIST  This document has been electronically signed. Apr 12 2020  2:52PM             (04-12-20 @ 14:30)      CT      CARDIOLOGY TESTING  12 Lead ECG:   Systolic  mmHg    Diastolic BP 50 mmHg    Ventricular Rate 100 BPM    Atrial Rate 100 BPM    P-R Interval 144 ms    QRS Duration 66 ms    Q-T Interval 298 ms    QTC Calculation(Bezet) 384 ms    P Axis 78 degrees    R Axis 23 degrees    T Axis66 degrees    Diagnosis Line Sinus rhythm with Fusion complexes  Low voltage QRS  Cannot rule out Anterior infarct , age undetermined  Abnormal ECG    Confirmed by RAMYA SILVA MD ([id number]) on 4/14/2020 10:26:25 AM (04-14-20 @ 04:02)  12 Lead ECG:   Systolic  mmHg    Diastolic BP 38 mmHg    Ventricular Rate 117 BPM    Atrial Rate 117 BPM    QRS Duration 70 ms    Q-T Interval 290 ms    QTC Calculation(Bezet) 404 ms    R Axis 21 degrees    T Axis 63 degrees    Diagnosis Line Sinus rhythm  short epsiode of afib for 5 bats   Low voltage QRS  Cannot rule out Anterior infarct , age undetermined  Abnormal ECG    Confirmed by CORY SKY MD ([id number]) on 4/14/2020 9:52:54 AM (04-14-20 @ 01:04)      MEDICATIONS  atorvastatin 40  chlorhexidine 0.12% Liquid 15  chlorhexidine 0.12% Liquid 15  chlorhexidine 4% Liquid 1  clopidogrel Tablet 75  guaifenesin/dextromethorphan  Syrup 10  heparin  Injectable 5000  insulin regular Infusion 1  lactated ringers Bolus 500  meropenem  IVPB 500  metoprolol tartrate 12.5  norepinephrine Infusion 0.05  pantoprazole  Injectable 40  senna 2  sevelamer carbonate Powder 1600  sodium chloride 0.9%. 1000  zinc sulfate 220      WEIGHT  Weight (kg): 87.3 (04-09-20 @ 06:00)  Creatinine, Serum: 2.8 mg/dL (04-14-20 @ 00:25)  Creatinine, Serum: 2.4 mg/dL (04-13-20 @ 16:12)      ANTIBIOTICS:  meropenem  IVPB 500 milliGRAM(s) IV Intermittent every 12 hours      All available historical records have been reviewed

## 2020-04-14 NOTE — CHART NOTE - NSCHARTNOTEFT_GEN_A_CORE
spoke with daughter Kala to provide him update on patient's condition  -Glo Arriola MD Radiology Communication Team

## 2020-04-14 NOTE — PROGRESS NOTE ADULT - ASSESSMENT
ALIYAH/ respiratory failure/ viral pneumonia/ COVID positive/ sepsis :  # anuric   # ALIYAH/ ATN in nature  # on pressors  # change feed to nepro  #s/p hd yesterday, with 1.5 liters fluid removal  # pneumothorax s/p CT placement   # ph noted, renagel 2/2/2  # overall prognosis poor  # will follow

## 2020-04-14 NOTE — PROGRESS NOTE ADULT - ASSESSMENT
NEURO:  Off sedation since 4/8, seroquel since 4/11  RASS -3 to -4  allow to wake up     RESP:      Acute respiratory failure      Intubated with a 7.5 tube on 3/30      on bilevel - Bi-level Phi 35, P low 0 fio2 80 thigh 4.8        Plat 38 peak 37       Right 28Fr Chest tube placed to suction      Wean vent as tolerated      Daily CXR      [facility]    CARDS:     new onset of afib RVR, converted back to NSR, continue metoprolol 12.5 BID, no need for AC      CAD s/p cardiac stent- continue Plavix  not on B-blockers at home      Levophed titrate to MAP >75, discontinue Vasopressin      Lisinopril at home, discontinued     elevated troponins, no longer trending         GI-        TF stopped due to abdominal distension noted. Restarting nepro on 4/14.       PPI w Protonix and Senna for bowel regimen       No bowel movements yesterday    Renal-      ALIYAH S/P HD 4/11 with 500 ml taken off. HD yesterday 1.5L taken off.      Hyperphosphatemia- continue to monitor      Add renagel      IVL      lebron in place, strict i&o      Twice daily electrolytes      monitor replete elytes prn      Heme-      Anemia- rpt H&H @1600- transfuse as needed if HGB <7, plavix      On HSQ, Plavix      SCD      no AC     ID-      Monitor for temps      Meropenem 1g q12 hr - change to renal dosing 500 mg q12h. total 10 day course(4/20)      WBC 20 ->28K. Meropenem for abx and 1 dose of vancomycin      Discontinue Left IJ TLC and give another dose vancomycin      1 dose of vancomycin today after HD      COVID -19 + with respiratory failure      Procalcitonin 17      Completed courses of Azithromycin and Plaquenil on 4/3,      Continue Cefepime q12 until 4/9    Endocrine     IDDM on lantus at home     On insulin gtt per protocol     H/O Hypothyroidism not on medications-  TSH 2.2     Dc steroids

## 2020-04-14 NOTE — PROGRESS NOTE ADULT - ASSESSMENT
76 yo female with medical hx of HTN, DM II, Hypothyroidism HLD BIBA for evaluation of syncope, Pt also endorses Cough generalized weakness and fever of 101F  for 3-4 days duration, fall at home, PEA with ROSC after son gave CPR    IMPRESSION  #Leukocytosis    4/11 B/C x2 negative    Procalcitonin, Serum: 40.70 (04-11-20 @ 18:26)  # COVID-19 PNA, intubated 3/30, septic shock requiring pressors    CXR b/l interstitial opacities   #Cytokine Release Syndrome   D-Dimer Assay, Quantitative: 3572 ng/mL DDU (04-14-20 @ 00:25)  #QTC Calculation(Bezet) 467 ms  #DM Hemoglobin A1C, Whole Blood: 9.9 (03-31-20 @ 05:30)  - completed 5 days of azithro/plaquenil   MRSA Nasal PCR negative  - off steroids      RECOMMENDATIONS  - TLC 3/31, seth 3/31-- recommend removal in the setting of rising WBC/procal   - s/p Vanc x1 4/10, restart if hemodynamic compromise  - Continue  Fatoumata 1g q12h iv   - Supportive care , grave prognosis

## 2020-04-14 NOTE — PROGRESS NOTE ADULT - SUBJECTIVE AND OBJECTIVE BOX
24 h events noted  intubated/ventilated        PAST HISTORY  --------------------------------------------------------------------------------  No significant changes to PMH, PSH, FHx, SHx, unless otherwise noted    ALLERGIES & MEDICATIONS  --------------------------------------------------------------------------------  Allergies    No Known Allergies    Intolerances      Standing Inpatient Medications  atorvastatin 40 milliGRAM(s) Oral at bedtime  chlorhexidine 0.12% Liquid 15 milliLiter(s) Oral Mucosa two times a day  chlorhexidine 0.12% Liquid 15 milliLiter(s) Oral Mucosa every 12 hours  chlorhexidine 4% Liquid 1 Application(s) Topical daily  clopidogrel Tablet 75 milliGRAM(s) Oral daily  guaifenesin/dextromethorphan  Syrup 10 milliLiter(s) Oral every 8 hours  heparin  Injectable 5000 Unit(s) SubCutaneous every 8 hours  insulin regular Infusion 1 Unit(s)/Hr IV Continuous <Continuous>  meropenem  IVPB 500 milliGRAM(s) IV Intermittent every 12 hours  metoprolol tartrate 12.5 milliGRAM(s) Enteral Tube two times a day  norepinephrine Infusion 0.05 MICROgram(s)/kG/Min IV Continuous <Continuous>  pantoprazole  Injectable 40 milliGRAM(s) IV Push daily  sevelamer carbonate Powder 1600 milliGRAM(s) Oral three times a day with meals  zinc sulfate 220 milliGRAM(s) Oral daily    PRN Inpatient Medications  acetaminophen   Tablet .. 650 milliGRAM(s) Oral every 6 hours PRN          VITALS/PHYSICAL EXAM  --------------------------------------------------------------------------------  T(C): 37.3 (04-14-20 @ 05:00), Max: 37.3 (04-14-20 @ 05:00)  HR: 95 (04-14-20 @ 07:00) (84 - 128)  BP: --  RR: 10 (04-14-20 @ 07:00) (10 - 27)  SpO2: 99% (04-14-20 @ 07:00) (90% - 100%)  Wt(kg): --        04-13-20 @ 07:01  -  04-14-20 @ 07:00  --------------------------------------------------------  IN: 1968.8 mL / OUT: 3849 mL / NET: -1880.2 mL      Physical Exam:  	Gen: intubated/ventilated  	Vascular access: udall     LABS/STUDIES  --------------------------------------------------------------------------------              8.3    22.27 >-----------<  211      [04-14-20 @ 00:25]              24.2     143  |  103  |  70  ----------------------------<  136      [04-14-20 @ 00:25]  4.6   |  22  |  2.8        Ca     7.3     [04-14-20 @ 00:25]      Mg     2.3     [04-14-20 @ 00:25]      Phos  6.7     [04-14-20 @ 00:25]    TPro  4.7  /  Alb  1.8  /  TBili  0.4  /  DBili  x   /  AST  48  /  ALT  87  /  AlkPhos  238  [04-14-20 @ 00:25]    PT/INR: PT 13.10, INR 1.14       [04-14-20 @ 01:10]  PTT: 37.7       [04-14-20 @ 01:10]          [04-13-20 @ 16:12]        [04-13-20 @ 16:12]    Creatinine Trend:  SCr 2.8 [04-14 @ 00:25]  SCr 2.4 [04-13 @ 16:12]  SCr 3.5 [04-13 @ 02:00]  SCr 3.2 [04-12 @ 17:18]  SCr 3.0 [04-12 @ 00:42]    Urinalysis - [04-11-20 @ 04:36]      Color Yellow / Appearance Slightly Turbid / SG 1.018 / pH 6.0      Gluc Negative / Ketone Trace  / Bili Negative / Urobili <2 mg/dL       Blood Small / Protein 100 mg/dL / Leuk Est Negative / Nitrite Negative      RBC 18 / WBC 14 / Hyaline 5 / Gran  / Sq Epi  / Non Sq Epi 2 / Bacteria Negative      Ferritin 1152      [04-11-20 @ 01:10]  HbA1c 9.9      [03-31-20 @ 05:30]  TSH 2.62      [03-28-20 @ 11:44]  Lipid: chol --, TG 95, HDL --, LDL --      [04-06-20 @ 00:30]      Free Light Chains: kappa 6.13, lambda 5.53, ratio = 1.11      [04-06 @ 00:30]

## 2020-04-15 NOTE — PROGRESS NOTE ADULT - SUBJECTIVE AND OBJECTIVE BOX
24 h events noted  intubated/ventilated         PAST HISTORY  --------------------------------------------------------------------------------  No significant changes to PMH, PSH, FHx, SHx, unless otherwise noted    ALLERGIES & MEDICATIONS  --------------------------------------------------------------------------------  Allergies    No Known Allergies    Intolerances      Standing Inpatient Medications  atorvastatin 40 milliGRAM(s) Oral at bedtime  calcium chloride Injectable 1000 milliGRAM(s) IV Push once  chlorhexidine 0.12% Liquid 15 milliLiter(s) Oral Mucosa two times a day  chlorhexidine 0.12% Liquid 15 milliLiter(s) Oral Mucosa every 12 hours  chlorhexidine 4% Liquid 1 Application(s) Topical daily  clopidogrel Tablet 75 milliGRAM(s) Oral daily  guaifenesin/dextromethorphan  Syrup 10 milliLiter(s) Oral every 8 hours  heparin  Injectable 5000 Unit(s) SubCutaneous every 8 hours  insulin regular Infusion 1 Unit(s)/Hr IV Continuous <Continuous>  lactulose Syrup 10 Gram(s) Oral two times a day  meropenem  IVPB 500 milliGRAM(s) IV Intermittent every 12 hours  metoprolol tartrate 12.5 milliGRAM(s) Enteral Tube two times a day  norepinephrine Infusion 0.05 MICROgram(s)/kG/Min IV Continuous <Continuous>  norepinephrine Infusion 0.05 MICROgram(s)/kG/Min IV Continuous <Continuous>  pantoprazole  Injectable 40 milliGRAM(s) IV Push daily  senna 2 Tablet(s) Oral at bedtime  sevelamer carbonate Powder 1600 milliGRAM(s) Oral three times a day with meals  sodium bicarbonate  Infusion 0.086 mEq/kG/Hr IV Continuous <Continuous>  sodium bicarbonate  Injectable 200 milliEquivalent(s) IV Push once  vancomycin  IVPB 1000 milliGRAM(s) IV Intermittent once  vasopressin Infusion 0.04 Unit(s)/Min IV Continuous <Continuous>  zinc sulfate 220 milliGRAM(s) Oral daily    PRN Inpatient Medications  acetaminophen   Tablet .. 650 milliGRAM(s) Oral every 6 hours PRN      VITALS/PHYSICAL EXAM  --------------------------------------------------------------------------------  T(C): 34.2 (04-15-20 @ 08:00), Max: 37.3 (04-14-20 @ 16:00)  HR: 53 (04-15-20 @ 08:00) (53 - 167)  BP: --  RR: 22 (04-15-20 @ 08:00) (10 - 22)  SpO2: 53% (04-15-20 @ 08:08) (52% - 100%)  Wt(kg): --        04-14-20 @ 07:01  -  04-15-20 @ 07:00  --------------------------------------------------------  IN: 5732.4 mL / OUT: 1712 mL / NET: 4020.4 mL    04-15-20 @ 07:01  -  04-15-20 @ 09:35  --------------------------------------------------------  IN: 261.5 mL / OUT: 0 mL / NET: 261.5 mL      Physical Exam:  	Gen: intubated/ventilated  	Vascular access: juanita     LABS/STUDIES  --------------------------------------------------------------------------------              6.6    9.36  >-----------<  159      [04-15-20 @ 06:40]              22.7     145  |  102  |  84  ----------------------------<  126      [04-15-20 @ 06:40]  6.7   |  10  |  3.8        Ca     7.2     [04-15-20 @ 06:40]      Mg     2.7     [04-15-20 @ 06:40]      Phos  14.2     [04-15-20 @ 06:40]    TPro  3.8  /  Alb  1.3  /  TBili  0.6  /  DBili  0.4  /  AST  >7000  /  ALT  3343  /  AlkPhos  288  [04-15-20 @ 06:40]    PT/INR: PT 13.10, INR 1.14       [04-14-20 @ 01:10]  PTT: 37.7       [04-14-20 @ 01:10]          [04-13-20 @ 16:12]        [04-13-20 @ 16:12]    Creatinine Trend:  SCr 3.8 [04-15 @ 06:40]  SCr 3.5 [04-15 @ 00:30]  SCr 3.4 [04-14 @ 16:00]  SCr 2.8 [04-14 @ 00:25]  SCr 2.4 [04-13 @ 16:12]    Urinalysis - [04-11-20 @ 04:36]      Color Yellow / Appearance Slightly Turbid / SG 1.018 / pH 6.0      Gluc Negative / Ketone Trace  / Bili Negative / Urobili <2 mg/dL       Blood Small / Protein 100 mg/dL / Leuk Est Negative / Nitrite Negative      RBC 18 / WBC 14 / Hyaline 5 / Gran  / Sq Epi  / Non Sq Epi 2 / Bacteria Negative      Ferritin 2830      [04-14-20 @ 00:25]  HbA1c 9.9      [03-31-20 @ 05:30]  TSH 2.62      [03-28-20 @ 11:44]  Lipid: chol --, TG 95, HDL --, LDL --      [04-06-20 @ 00:30]      Free Light Chains: kappa 6.13, lambda 5.53, ratio = 1.11      [04-06 @ 00:30] 24 h events noted  intubated/ventilated         PAST HISTORY  --------------------------------------------------------------------------------  No significant changes to PMH, PSH, FHx, SHx, unless otherwise noted    ALLERGIES & MEDICATIONS  --------------------------------------------------------------------------------  Allergies    No Known Allergies    Intolerances      Standing Inpatient Medications  atorvastatin 40 milliGRAM(s) Oral at bedtime  calcium chloride Injectable 1000 milliGRAM(s) IV Push once  chlorhexidine 0.12% Liquid 15 milliLiter(s) Oral Mucosa two times a day  chlorhexidine 0.12% Liquid 15 milliLiter(s) Oral Mucosa every 12 hours  chlorhexidine 4% Liquid 1 Application(s) Topical daily  clopidogrel Tablet 75 milliGRAM(s) Oral daily  guaifenesin/dextromethorphan  Syrup 10 milliLiter(s) Oral every 8 hours  heparin  Injectable 5000 Unit(s) SubCutaneous every 8 hours  insulin regular Infusion 1 Unit(s)/Hr IV Continuous <Continuous>  lactulose Syrup 10 Gram(s) Oral two times a day  meropenem  IVPB 500 milliGRAM(s) IV Intermittent every 12 hours  metoprolol tartrate 12.5 milliGRAM(s) Enteral Tube two times a day  norepinephrine Infusion 0.05 MICROgram(s)/kG/Min IV Continuous <Continuous>  norepinephrine Infusion 0.05 MICROgram(s)/kG/Min IV Continuous <Continuous>  pantoprazole  Injectable 40 milliGRAM(s) IV Push daily  senna 2 Tablet(s) Oral at bedtime  sevelamer carbonate Powder 1600 milliGRAM(s) Oral three times a day with meals  sodium bicarbonate  Infusion 0.086 mEq/kG/Hr IV Continuous <Continuous>  sodium bicarbonate  Injectable 200 milliEquivalent(s) IV Push once  vancomycin  IVPB 1000 milliGRAM(s) IV Intermittent once  vasopressin Infusion 0.04 Unit(s)/Min IV Continuous <Continuous>  zinc sulfate 220 milliGRAM(s) Oral daily    PRN Inpatient Medications  acetaminophen   Tablet .. 650 milliGRAM(s) Oral every 6 hours PRN    Vancomycin Level, Random: 7.4  VITALS/PHYSICAL EXAM  --------------------------------------------------------------------------------  T(C): 34.2 (04-15-20 @ 08:00), Max: 37.3 (04-14-20 @ 16:00)  HR: 53 (04-15-20 @ 08:00) (53 - 167)  BP: --  RR: 22 (04-15-20 @ 08:00) (10 - 22)  SpO2: 53% (04-15-20 @ 08:08) (52% - 100%)  Wt(kg): --        04-14-20 @ 07:01  -  04-15-20 @ 07:00  --------------------------------------------------------  IN: 5732.4 mL / OUT: 1712 mL / NET: 4020.4 mL    04-15-20 @ 07:01  -  04-15-20 @ 09:35  --------------------------------------------------------  IN: 261.5 mL / OUT: 0 mL / NET: 261.5 mL      Physical Exam:  	Gen: intubated/ventilated  	Vascular access: juanita     LABS/STUDIES  --------------------------------------------------------------------------------              6.6    9.36  >-----------<  159      [04-15-20 @ 06:40]              22.7     145  |  102  |  84  ----------------------------<  126      [04-15-20 @ 06:40]  6.7   |  10  |  3.8        Ca     7.2     [04-15-20 @ 06:40]      Mg     2.7     [04-15-20 @ 06:40]      Phos  14.2     [04-15-20 @ 06:40]    TPro  3.8  /  Alb  1.3  /  TBili  0.6  /  DBili  0.4  /  AST  >7000  /  ALT  3343  /  AlkPhos  288  [04-15-20 @ 06:40]    PT/INR: PT 13.10, INR 1.14       [04-14-20 @ 01:10]  PTT: 37.7       [04-14-20 @ 01:10]          [04-13-20 @ 16:12]        [04-13-20 @ 16:12]    Creatinine Trend:  SCr 3.8 [04-15 @ 06:40]  SCr 3.5 [04-15 @ 00:30]  SCr 3.4 [04-14 @ 16:00]  SCr 2.8 [04-14 @ 00:25]  SCr 2.4 [04-13 @ 16:12]    Urinalysis - [04-11-20 @ 04:36]      Color Yellow / Appearance Slightly Turbid / SG 1.018 / pH 6.0      Gluc Negative / Ketone Trace  / Bili Negative / Urobili <2 mg/dL       Blood Small / Protein 100 mg/dL / Leuk Est Negative / Nitrite Negative      RBC 18 / WBC 14 / Hyaline 5 / Gran  / Sq Epi  / Non Sq Epi 2 / Bacteria Negative      Ferritin 2830      [04-14-20 @ 00:25]  HbA1c 9.9      [03-31-20 @ 05:30]  TSH 2.62      [03-28-20 @ 11:44]  Lipid: chol --, TG 95, HDL --, LDL --      [04-06-20 @ 00:30]      Free Light Chains: kappa 6.13, lambda 5.53, ratio = 1.11      [04-06 @ 00:30]

## 2020-04-15 NOTE — PROGRESS NOTE ADULT - ASSESSMENT
ASSESSMENT:  75F COVID+    PLAN:    NEURO:  Off all sedation    RESP:      Acute respiratory failure      Intubated with a 7.5tube on 3/30      Vent 400/20/100/19      AM ABG  6.85/60/81/10.4, lactate 16      Daily CXR:       completed course of solumedrol 4/8      Robutssin DM       4/11 R Tension PTX, right 28Fr Chest Tube placed to suction          CARDS:      Hypotension: Maxed on levo, vaso, epi, remains hypotensive      Afib w/ RVR 4/14 day, HR now in 60s      Metoprolol 12.5 BID  - Afib,lopressor 2.5 x2        CAD s/p cardiac stent- continue plavix, not on B-blockers at home        Lisinopril discontinued      Atorvastatin      4/13 EKG : qtc 400 afib rate 117         GI-   TF:  holding feeds   ogt placed on suction op: 125 cc billious op  abd softer still minimally distended, no guarding noted overnight to palpation   PPI w Protonix and Senna for bowel regimen-    last BM 4/13     Shock liver  LFTS: AST 2871, ALT 1512,     Renal      lebron, strict i/o's,  -- anuric      Lactate uptrending, now 16      HD 4/13 1L removed       BUN/CR 80/3.5 (81/3.4)     Lytes-04-15 @ 00:30 Na 144   K 5.8   Phos 12.1    Mg 2.6     04-14 @ 16:00 Na 144   K 5.3   Phos 8.7    Mg 2.4      Daily weight: 4/12 95.5, 4/14 90 kg         Heme-      Hgb pending T&S 4/12-- H/H today 8.3/24.2      On HSQ      SCD      Ddimer 2849>3100>3572( 4/14)    4/14  Fibrinogen >700       ID-      Continue meropenem 1g Q12,  Completed courses of Cefepime, Plaqueenil and Azithromycin      Bld cx x2-  4/11 ngtd      Ucx 4/11 : coag neg staph       Reactive leukocytosis WBC pending    Endocrine     IDDM on lantus at home (held)     insulin gtt at 1      H/O Hypothyroidism not on medications-  TSH 2.2     off solumedrol

## 2020-04-15 NOTE — PROGRESS NOTE ADULT - ATTENDING COMMENTS
improved oxygenation   continue vent support \TF to goal   continue ICU care
pt was seen and examined as she was being intubated for respiratory failure.  There is no evidence of cardiac etiology earl concern  This appears to be all complications of Covid-19
ON APRV   stable and improving oxygenation   for HD today   continue BP support  continue NPO, NGT   continue ICU Care   CT to suction
deteriorated overnight   with sever hypoxemia   severe lactic and respiratory acidosis   on 3 pressors and bicarb drip   anuric with hyperkalemia   not expected to survive   continue current care   family aware
increase in BUR/Creat   not aroused   continue to hold sedation   d/c lakia and keep only on levo and titrate to MAP>75 mmHg   continue ICU iraheta
off sedation with minimal response , will continue to hold sedation   respiratory acidosis , will increase RR   continue BP support   continue TF   continue ICU care
on minimal vent support   will diurese   possible SBT once awake   continue ICU Care
remains on high vent support   oliguric s/o HD   hypotensive on pressors   will resume trickle feeds   continue ICU Care
stable overnight   continue to wean pressors   trickle feeds   continue ICU Care
stable overnight   continue to wean vent support   continue TF   continue VTE prophylaxis   continue ICU care
stable overnight   continue vent support   continue to hold sedation   will diurese   continue TF   continue ICU Care
improved oxygenation will continue to wean   continue TF   continue ICU care   seen at bedside during AM Rounds
s/p RIJ [place] c/w PTX s/p CT   intubated and sedated   continue vent support   BP support   for HD continue Tube feeds   d/c left IJ   continue ICU care
stable overnight   continue to wean sedation   continue TF   will diurese   continue ICU care
remains intubated and sedated   stable saturation   continue BP support   advance Tf   continue antibiotics   DVT prophylaxis   continue ICU care
hypoxic overnight with increase in FIO2 , slowly recovering   continue BP support   continue IV antibiotics   continue ICU care

## 2020-04-15 NOTE — PROGRESS NOTE ADULT - NSHPATTENDINGPLANDISCUSS_GEN_ALL_CORE
ICU Team

## 2020-04-15 NOTE — PROGRESS NOTE ADULT - ASSESSMENT
ALIYAH/ respiratory failure/ viral pneumonia/ COVID positive/ sepsis :  # anuric   # ALIYAH/ ATN in nature  # on pressors  #severe acidosis with hyperkalemia, hd today 1 k bath , uf 1 liter , will need cvvhd after   # pneumothorax s/p CT placement   # ph noted, renagel 2/2/2  # overall prognosis poor  # will follow

## 2020-04-15 NOTE — DISCHARGE NOTE FOR THE EXPIRED PATIENT - HOSPITAL COURSE
Patient is a 76 yo female with medical hx of HTN, DM II, Hypothyroidism HLD BIBA 3/28/20 for evaluation of syncope, Pt also endorses Cough generalized weakness and fever of 101F  for 3-4 days duration. Patient states she fell at home while trying to get out of bed, she remembered feeling weak and nauseous  right before the fall. Pt also states that her Son gave her CPR as he couldn't feel her pulse after she collapsed, CPR lasted for about one minute before achieving ROSC. No HT, No AC use, + LOC and AP use    Patient  is also sick at home with similar symptoms, she denied known covid exposure and recent travel.    Pt was intubated and upgraded to ICU HOD 3 Patient is a 74 yo female with medical hx of HTN, DM II, Hypothyroidism HLD BIBA 3/28/20 for evaluation of syncope, Pt also endorses Cough generalized weakness and fever of 101F  for 3-4 days duration. Patient states she fell at home while trying to get out of bed, she remembered feeling weak and nauseous  right before the fall. Pt also states that her Son gave her CPR as he couldn't feel her pulse after she collapsed, CPR lasted for about one minute before achieving ROSC. No HT, No AC use, + LOC and AP use    Patient  is also sick at home with similar symptoms, she denied known covid exposure and recent travel.    Pt was intubated and upgraded to ICU 3/30/20, lines placed, remained intubated, sedated on pressors, abx, tube feeds, and high vent settings since.      20: PEEP increased to 15; PM AB.26/58/94/26, RR was increased to 25; On levo at 0.1, and weaning; Started methylprednisolon 60q12    : A/C: 400/25/80/15; ETT at thoracic inlet on xray, advanced ETT and repeated xray; 3pm AB.33/48/155/25 -> decreased FiO2 from 80 to 70; CRP: 41 > 41 > 40      4/3: AB.36/44/101/25; on /25/55/15; decreased FiO2 to 50.    : Episode of desaturation to 85%. Attempted PEEP recruitment x1 w/o improvement. Increased FiO2 to 80%. based upon ABG and O2 sats. Episode of Afib HR 150s. Lopressor 5x1     : no AC for afib, back in NSR on EKG; weaning vent, fio2 to 40% from 50%; PEEP down to 12.5 from 14, ABG pao2 86; keep VS unless paO2 >200    : remains hypothermic, bear hugger in place; levophed held at 5:30 am; UO wnl; remaoins on insulin gtt; bear hugger for hypothermia --> last temp 98    : IVL, 20mg lasix IVP; maintain vent settings, f/u pm ABG    : hypokalemia treated, cleviprex weaned off; MAP low o/n; d/c all sedation, add seroquel; lasix 40 IVP; dc steriods; add dulcolax NH; cleviprex gtt SBP >200    : Vent settings: 25/400/60%/12; PM AB.22/65/90/27; D/c levo, change to derek due to tachycardia; d/c solumedrol; lasix 40mg; d/c labetolol but on metoprolol 12.5, +BM 4/9    4/10: incr RR to 30. 30/400/60%/12; PM AB.27/52/81/24; wean down Derek; add Vaso  -started on Vanco, Fatoumata; bolus LR 1L, on LR 100cc/hr; added Levo; Lasix 40 ;    : AB.03/56/102/15/lactate 3.2; Vent settings: 30/400/90%/12; R Tension PTX, right 28Fr Chest Tube placed to suction; RIJ [place] placement. 1st HD  (-)500cc -d/c Seroquel; d/c Derek; d/c Vaso if BP wnl while on Levo 0.5; restart TF @ 20cc (held overnight when on 3 pressors); started on ISS; Bolused 1L, 40 Lasix give with minimal to no resoponse in UOP.     : NO HD, changed vebt to Bi-level with no change in  ABG; Overnight: post HD, acidosis improved PaO2 231, decreased FIO2 to 70%, Hgb downtrending 8.7>7.6, in am PaO2 122, decreased FIO2 to 60%.    : HD today. 1L removed. Bun/Cr 60/2.4; Lactate 3.2. Trend 11:30 value. PM AB.27/52/ 91/24; Vent settings: Bi-level Phi 35, P low 0, FiO2 100%, thigh 4.8. FiO2 increased due to hypoxia during dialysis. Nepro restarted; Blood cx negative. Urine cx - coagulase negative staphylococci      Night  - desat o/n, ABG 6.99/59.9/63.9/59.9, lactate 8.2  - gave 2 amp bicarb, started bicarb gtt  - MAPs dropped to 50s, maxed on levo, vaso, added epi, now maxed  - family notified, compassionate visit completed  Day  - Became hypotensive and went into afib w/ RVR rate 180s. Lopressor 5mg IVP given. - -- - Amiodarone bolus and gtt started. Vasopressin added for levo > 1mcg/kg.  - Pt became hypoxic during this episode. Repeat CXR showing persistent pneumomediastinum, subcutenous emphysema, and right apical PTX.   - PM AB.22/48/83/20. Based off of Bi-level settings.  - Vent settings changed to /20/100/15.  - Remains oliguric.     Remained hypotensive, bradycardic, hypoxic to 50s on pressors, high vent settings, TOD pronounced @ 10:24 am

## 2020-04-15 NOTE — PROGRESS NOTE ADULT - SUBJECTIVE AND OBJECTIVE BOX
TRICIA SANTIZO  [id number]  75y Female    Indication for ICU admission:  acute respiratory failure COVID-19 positive, pneumonia, HD instability  Admit Date: 3/27  ICU Date:  3/31    No Known Allergies    PAST MEDICAL & SURGICAL HISTORY:  Hypothyroidism: Hypothyroidism  Essential hypertension: HTN (hypertension)  Arthropathy: Arthritis  Uncontrolled type 2 diabetes mellitus: Diabetes mellitus type II, uncontrolled  Hyperlipidemia: Hyperlipidemia  H/O heart artery stent    Home Medications:  glyBURIDE 5 mg oral tablet: 1 tab(s) orally once a day (28 Mar 2020 05:20)  lisinopril 20 mg oral tablet: 1 tab(s) orally once a day (09 May 2018 12:13)  metFORMIN 500 mg oral tablet, extended release: 2 tab(s) orally 2 times a day (28 Mar 2020 05:19)  Plavix 75 mg oral tablet: 1 tab(s) orally once a day (09 May 2018 12:13)  rosuvastatin 20 mg oral tablet: 1 tab(s) orally once a day (28 Mar 2020 05:19)      24HRS EVENT:  Yesterday during day became hypotensive, went into afib with RVR accompanied by hypoxia, switched from bilevel to AC, remained oliguric. Overnight desatted, with ABG 6.99/59.9/63.9/59.9, lactate 8.2, MAP dropped to 50, now maxed on levo/epi/vaso/bicarb. Family allowed compassionate visit      NEURO:  Off all sedation    RESP:      Acute respiratory failure      Intubated with a 7.5tube on 3/30      Vent 400/20/100/19      AM ABG  6.85/60/81/10.4, lactate 16      Daily CXR:       completed course of solumedrol 4/8      Robutssin DM       4/11 R Tension PTX, right 28Fr Chest Tube placed to suction          CARDS:      Hypotension: Maxed on levo, vaso, epi, remains hypotensive      Afib w/ RVR 4/14 day, HR now in 60s      Metoprolol 12.5 BID  - Afib,lopressor 2.5 x2        CAD s/p cardiac stent- continue plavix, not on B-blockers at home        Lisinopril discontinued      Atorvastatin      4/13 EKG : qtc 400 afib rate 117         GI-   TF:  holding feeds   ogt placed on suction op: 125 cc billious op  abd softer still minimally distended, no guarding noted overnight to palpation   PPI w Protonix and Senna for bowel regimen-    last BM 4/13     Shock liver  LFTS: AST 2871, ALT 1512,     Renal      lebron, strict i/o's,  -- anuric      Lactate uptrending, now 16      HD 4/13 1L removed       BUN/CR 80/3.5 (81/3.4)     Lytes-04-15 @ 00:30 Na 144   K 5.8   Phos 12.1    Mg 2.6     04-14 @ 16:00 Na 144   K 5.3   Phos 8.7    Mg 2.4      Daily weight: 4/12 95.5, 4/14 90 kg         Heme-      Hgb pending T&S 4/12-- H/H today 8.3/24.2      On HSQ      SCD      Ddimer 2849>3100>3572( 4/14)    4/14  Fibrinogen >700       ID-      Continue meropenem 1g Q12,  Completed courses of Cefepime, Plaqueenil and Azithromycin      Bld cx x2-  4/11 ngtd      Ucx 4/11 : coag neg staph       Reactive leukocytosis WBC pending    Endocrine     IDDM on lantus at home (held)     insulin gtt at 1      H/O Hypothyroidism not on medications-  TSH 2.2     off solumedrol       DVT PPx: HSQ    GI PPX: PPI     Lines:   Left brachial PICC 4/12  R IJ [place]   R radial A line    Lebron  OGT     Review of systems:    [ ]A ten-point review of systems was otherwise negative except as noted above.  [x ]Due to altered mental status/intubation, subjective information was not attained from the patient.  History was obtained, to the extent possible, from review of the chart and collateral sources of information.    Daily     Daily     Diet, NPO with Tube Feed:   Tube Feeding Modality: Orogastric  Nepro with Carb Steady  Total Volume for 24 Hours (mL): 720  Continuous  Starting Tube Feed Rate mL per Hour: 30  Until Goal Tube Feed Rate (mL per Hour): 30  Tube Feed Duration (in Hours): 24  Tube Feed Start Time: 09:30 (04-14-20 @ 09:09)      CURRENT MEDS:  Neurologic Medications  acetaminophen   Tablet .. 650 milliGRAM(s) Oral every 6 hours PRN Temp greater or equal to 38C (100.4F)    Respiratory Medications  guaifenesin/dextromethorphan  Syrup 10 milliLiter(s) Oral every 8 hours    Cardiovascular Medications  aMIOdarone Infusion 0.999 mG/Min IV Continuous <Continuous>  EPINEPHrine    Infusion 0.01 MICROgram(s)/kG/Min IV Continuous <Continuous>  metoprolol tartrate 12.5 milliGRAM(s) Enteral Tube two times a day  norepinephrine Infusion 0.05 MICROgram(s)/kG/Min IV Continuous <Continuous>    Gastrointestinal Medications  pantoprazole  Injectable 40 milliGRAM(s) IV Push daily  senna 2 Tablet(s) Oral at bedtime  sodium bicarbonate  Infusion 0.086 mEq/kG/Hr IV Continuous <Continuous>  zinc sulfate 220 milliGRAM(s) Oral daily    Genitourinary Medications    Hematologic/Oncologic Medications  clopidogrel Tablet 75 milliGRAM(s) Oral daily  heparin  Injectable 5000 Unit(s) SubCutaneous every 8 hours    Antimicrobial/Immunologic Medications  meropenem  IVPB 500 milliGRAM(s) IV Intermittent every 12 hours    Endocrine/Metabolic Medications  atorvastatin 40 milliGRAM(s) Oral at bedtime  insulin regular Infusion 1 Unit(s)/Hr IV Continuous <Continuous>  vasopressin Infusion 0.04 Unit(s)/Min IV Continuous <Continuous>    Topical/Other Medications  chlorhexidine 0.12% Liquid 15 milliLiter(s) Oral Mucosa two times a day  chlorhexidine 0.12% Liquid 15 milliLiter(s) Oral Mucosa every 12 hours  chlorhexidine 4% Liquid 1 Application(s) Topical daily  sevelamer carbonate Powder 1600 milliGRAM(s) Oral three times a day with meals      ICU Vital Signs Last 24 Hrs  T(C): 36.3 (15 Apr 2020 04:00), Max: 37.3 (14 Apr 2020 16:00)  T(F): 97.3 (15 Apr 2020 04:00), Max: 99.1 (14 Apr 2020 16:00)  HR: 58 (15 Apr 2020 07:00) (57 - 167)  BP: --  BP(mean): --  ABP: 70/39 (15 Apr 2020 07:00) (70/32 - 150/50)  ABP(mean): 68 (14 Apr 2020 18:00) (56 - 84)  RR: 22 (14 Apr 2020 18:40) (10 - 22)  SpO2: 70% (15 Apr 2020 07:00) (70% - 100%)        Mode: AC/ CMV (Assist Control/ Continuous Mandatory Ventilation)  RR (machine): 20  TV (machine): 400  FiO2: 100  PEEP: 15  ITime: 1  MAP: 21  PIP: 43    ABG - ( 15 Apr 2020 04:00 )  pH, Arterial: 6.85  pH, Blood: x     /  pCO2: 60    /  pO2: 81    / HCO3: 10    / Base Excess: -21.5 /  SaO2: 83            I&O's Summary    14 Apr 2020 07:01  -  15 Apr 2020 07:00  --------------------------------------------------------  IN: 5732.4 mL / OUT: 1912 mL / NET: 3820.4 mL      I&O's Detail    14 Apr 2020 07:01  -  15 Apr 2020 07:00  --------------------------------------------------------  IN:    amiodarone Infusion: 186.8 mL    amiodarone Infusion: 199.8 mL    EPINEPHrine Infusion: 5 mL    insulin regular Infusion: 3 mL    IV PiggyBack: 100 mL    Lactated Ringers IV Bolus: 500 mL    Nepro: 660 mL    norepinephrine Infusion: 3244.2 mL    sodium bicarbonate  Infusion: 500 mL    sodium chloride 0.9%: 300 mL    vasopressin Infusion: 33.6 mL  Total IN: 5732.4 mL    OUT:    Chest Tube: 1800 mL    Indwelling Catheter - Urethral: 112 mL  Total OUT: 1912 mL    Total NET: 3820.4 mL          PHYSICAL EXAM:    General/Neuro  RASS: -2 to -3  Pupils: PERRLA  Sedated, no focal defecits    Lungs: BS decreased b/l  Mechanically ventilated  Mode: AC/ CMV (Assist Control/ Continuous Mandatory Ventilation)  RR (machine): 20  TV (machine): 400  FiO2: 100  PEEP: 15  ITime: 1  MAP: 21  PIP: 43    Cardiovascular: S1, S2  Regular rate and rhythm  Peripheral edema    GI: Abdomen soft, nontender, nondistended    Extremities: Warm, pink, well-perfused. Pulses palp b/l    Derm: Good skin turgor, no skin breakdown    : Le[place] catheter in place       CXR:   < from: Xray Chest 1 View- PORTABLE-Routine (04.15.20 @ 04:17) >  IMPRESSION:     Decreasing pneumomediastinum and right apical pneumothorax.    Unchanged bilateral opacities.    < end of copied text >        LABS:  CAPILLARY BLOOD GLUCOSE      POCT Blood Glucose.: 116 mg/dL (15 Apr 2020 06:23)  POCT Blood Glucose.: 135 mg/dL (15 Apr 2020 02:15)  POCT Blood Glucose.: 142 mg/dL (14 Apr 2020 22:10)  POCT Blood Glucose.: 154 mg/dL (14 Apr 2020 20:14)  POCT Blood Glucose.: 164 mg/dL (14 Apr 2020 18:27)  POCT Blood Glucose.: 168 mg/dL (14 Apr 2020 14:07)  POCT Blood Glucose.: 137 mg/dL (14 Apr 2020 12:18)  POCT Blood Glucose.: 89 mg/dL (14 Apr 2020 10:07)  POCT Blood Glucose.: 123 mg/dL (14 Apr 2020 08:11)                          7.7    19.11 )-----------( 207      ( 14 Apr 2020 16:00 )             23.3         04-15    144  |  102  |  80<HH>  ----------------------------<  147<H>  5.8<H>   |  14<L>  |  3.5<H>    Ca    7.0<L>      15 Apr 2020 00:30  Phos  12.1     04-15  Mg     2.6     04-15    TPro  4.0<L>  /  Alb  1.4<L>  /  TBili  0.5  /  DBili  x   /  AST  2871<H>  /  ALT  1512<H>  /  AlkPhos  270<H>  04-15      PT/INR - ( 14 Apr 2020 01:10 )   PT: 13.10 sec;   INR: 1.14 ratio         PTT - ( 14 Apr 2020 01:10 )  PTT:37.7 sec  CARDIAC MARKERS ( 13 Apr 2020 16:12 )  x     / x     / 103 U/L / x     / x

## 2020-04-16 LAB
CULTURE RESULTS: SIGNIFICANT CHANGE UP
CULTURE RESULTS: SIGNIFICANT CHANGE UP
SPECIMEN SOURCE: SIGNIFICANT CHANGE UP
SPECIMEN SOURCE: SIGNIFICANT CHANGE UP

## 2021-04-15 NOTE — ED ADULT TRIAGE NOTE - BP NONINVASIVE DIASTOLIC (MM HG)
Palliative Medicine Consult Don: [phone]) Patient Name: Sonido Carrillo YOB: 1957 Date of Initial Consult: 4/14/2021 Reason for Consult: Care Decisions Requesting Provider: Carloz Jo MD 
Primary Care Physician: Allen Rocha MD 
 
 SUMMARY:  
Sonido Carrillo is a 66 y.o. with a past history of a-fib, breast cancer, CKD, DMII, HTN, morbid obesity, osteoarthritis, pacemaker, and asthma, who was transferred on 4/7/2021 from [facility] with a diagnosis of Chest pain and acute on chronic CHF. Planned for cardiac cath +/- PCI She was a high risk PCI, but agreed to the risk as her risk of progressive heart failure and death was likely higher without cardiac cath She had a cath on 4/8 which showed multi vessel disease and moderate to severe MR with pulm htn. She also has new onset ICM with an EF of 20-25% She was transferred to the CCU- was treated for cardiogenic shock and started on inotropic support. Cardiac surgery evaluated her, but patient declined a surgical option due to it being too high risk. [place] catheter was placed on 4/9 She underwent PCI/ADDIS to mid LAD on 4/13- it was successful without need for impella support. That evening, after the sheath was pulled, she had an actively expanding hematoma to the right groin. Vascular surgery took her to the OR for a right groin exploration and hematoma evacuation. Drains were placed to help control bleeding She has had impressive output from the drains still this morning. She is being transfused Current medical issues leading to Palliative Medicine involvement include: goals of care in the setting of critical illness requiring a high risk cardiac procedure that resulted in complications PALLIATIVE DIAGNOSES:  
1. DNR Discussion 2. Goals of care 3. Need for emotional support 4. Shortness of breath 5. Hypovolemic shock 6. Cardiogenic shock PLAN:  
1.  Reviewed the chart and spoke with nursing as well as the attending, cardiology NP, and nephrology 2. Ms Arnie Samuels is about the same as yesterday- drains with little output now, but may still be bleeding into her groin (drains are clotted) 3. Still very hypotensive, requiring a lot of hemodynamic support as well as transfusions etc 
4. Plan for art line today to see if we can get a better picture of her blood pressure 5. Supportive care at this point- still very unstable 6. I called her - he is difficult to talk to on the phone, and has a very primitive understanding. Yesterday he did not understand why she could not eat or drink, but also was sure that she would not survive the night. I want to talk to him in person to better explain what is happening. He is supposed to come in to the hospital today, but could not commit to a time. Will be available to him when he comes 7. Initial consult note routed to primary continuity provider and/or primary health care team members 8. Communicated plan of care with: Palliative [facility] Team 
 
Addendum:  Met with Mr Arnie Samuels at bedside He is very emotional, but does seem to better grasp the gravity of his wife's situation,  He shared that she looks and feels like a cadaver, and feels that she is \"just a plant, being kept alive, not a person anymore\" He is struggling with his wish to let her pass peacefully, and worried that it is \"too soon\" I talked with him for a bit to let him process his feelings, but in the end we agreed to meet again in the morning. He is prepared to let her transition to comfort measures only if she is worse in the morning, but if she remains critically stable, we will just talk more through his emotional processing He is clear that she never wanted to be kept alive \"as a vegetable\" so the amount of support she is on is really traumatizing him.    
 
Will follow closely for support, he needs a lot of emotional support as well as simple explanations to help him process what he is seeing GOALS OF CARE / TREATMENT PREFERENCES:  
 
GOALS OF CARE: 
Patient/Health Care Proxy Stated Goals: Cure TREATMENT PREFERENCES:  
Code Status: DNR Advance Care Planning: 
[x] The [facility] Interdisciplinary Team has updated the ACP Navigator with [address] and Patient Capacity Primary Decision Maker: Adonis Winn - Spouse - [phone] Advance Care Planning 4/7/2021 Confirm Advance Directive Yes, on file Patient Would Like to Complete Advance Directive - Medical Interventions: Limited additional interventions Other Instructions: Other: As far as possible, the palliative care team has discussed with patient / health care proxy about goals of care / treatment preferences for patient. HISTORY:  
 
History obtained from: chart, family CHIEF COMPLAINT: none- intubated and sedated HPI/SUBJECTIVE: The patient is:  
[] Verbal and participatory [x] Non-participatory due to:  
condition Clinical Pain Assessment (nonverbal scale for severity on nonverbal patients):  
Clinical Pain Assessment Severity: 0 Activity (Movement): Lying quietly, normal position Duration: for how long has pt been experiencing pain (e.g., 2 days, 1 month, years) Frequency: how often pain is an issue (e.g., several times per day, once every few days, constant) FUNCTIONAL ASSESSMENT:  
 
Palliative Performance Scale (PPS): PPS: 20 
 
 
 PSYCHOSOCIAL/SPIRITUAL SCREENING:  
 
Palliative IDT has assessed this patient for cultural preferences / practices and a referral made as appropriate to needs (Cultural Services, Patient Advocacy, Ethics, etc.) Any spiritual / Catholic concerns: 
[] Yes /  [x] No 
 
Caregiver Burnout: 
[] Yes /  [x] No /  [] No Caregiver Present Anticipatory grief assessment:  
[x] Normal  / [] Maladaptive ESAS Anxiety: Anxiety: 0 
 
ESAS Depression: Depression: 0  REVIEW OF SYSTEMS:  
 
Positive and pertinent negative findings in ROS are noted above in HPI. The following systems were [x] reviewed / [] unable to be reviewed as noted in HPI Other findings are noted below. Systems: constitutional, ears/nose/mouth/throat, respiratory, gastrointestinal, genitourinary, musculoskeletal, integumentary, neurologic, psychiatric, endocrine. Positive findings noted below. Modified ESAS Completed by: provider Fatigue: 10    
Depression: 0 Pain: 0 Anxiety: 0 Nausea: 0 Dyspnea: 0 Stool Occurrence(s): 1 PHYSICAL EXAM:  
 
From RN flowsheet: 
Wt Readings from Last 3 Encounters:  
04/14/21 274 lb 14.6 oz (124.7 kg) 03/10/21 232 lb (105.2 kg) 02/08/21 264 lb (119.7 kg) Blood pressure (!) 93/43, pulse 91, temperature 96.9 °F (36.1 °C), resp. rate 18, height 5' 10\" (1.778 m), weight 274 lb 14.6 oz (124.7 kg), SpO2 100 %. Pain Scale 1: Behavioral Pain Scale (BPS) Pain Intensity 1: 3 Pain Onset 1: acute Pain Location 1: Leg 
Pain Orientation 1: Right Pain Description 1: Aching Pain Intervention(s) 1: Medication (see MAR) Last bowel movement, if known:  
 
Constitutional: sedated Eyes: pupils equal, anicteric ENMT: no nasal discharge, moist mucous membranes Respiratory: on vent Skin: warm, dry, surgical sites to groin, two drains Other: 
 
 
 HISTORY:  
 
Active Problems: 
  Chest pain (11/23/2020) CHF (congestive heart failure) ([facility] (4/7/2021) NSTEMI (non-ST elevated myocardial infarction) ([facility] (4/7/2021) S/P cardiac cath (4/8/2021) Overview: 4/13/2021: s/p PCI/ADDIS to mid LAD x 1  
    4/8/2021: cardiac cath showed MVD. Mod/severe MR, elevated PA pressures DNR (do not resuscitate) discussion () Goals of care, counseling/discussion () Need for emotional support () Shortness of breath () Past Medical History:  
Diagnosis Date  Acquired lymphedema Right arm  Acute respiratory failure ([facility] 12/19/2020  Arrhythmia  Asthma  Atrial fibrillation ([facility]  Cancer ([facility] bilateral breast  
 Chronic kidney disease  Diabetes mellitus type II   
 Diabetic ulcer of left heel associated with type 2 diabetes mellitus, with fat layer exposed ([address] 6/21/2019  Diabetic ulcer of left midfoot with necrosis of muscle ([address] 3/3/2020  Dizziness  Essential hypertension  Hyperlipidemia  Hypertension  Long term (current) use of anticoagulants  Morbid obesity ([facility] 3/14/2012  Nausea & vomiting  Neuropathy  Osteoarthritis  Pacemaker  Sick sinus syndrome ([facility]  Type 2 diabetes mellitus with left diabetic foot infection ([facility] 10/29/2019 Past Surgical History:  
Procedure Laterality Date  HX AFIB ABLATION    
 HX AMPUTATION FOOT Left 04/2020  HX BREAST RECONSTRUCTION Bilateral   
 HX CARPAL TUNNEL RELEASE [address] [id number] [address] RIGHT MODIFIED RADICAL   
 HX MASTECTOMY Left   
 no lymphnode removal  
 HX MOHS PROCEDURES  1995  
 right  HX ORTHOPAEDIC Right   
 knee surgery  HX PACEMAKER  11/17/2020 Dr. Maxine Stringer. Insertion of permanent pacemaker. AV node ablation  HX PACEMAKER    
 IR INSERT TUNL CVC W PORT OVER 5 YEARS    
 IR INSERT TUNL CVC W/O PORT OVER 5 YR  5/4/2020  IR INSERT TUNL CVC W/O PORT OVER 5 YR  9/8/2020  IR REMOVE TUNL CVAD W/O PORT / PUMP  6/26/2020  IR REMOVE TUNL CVAD W/O PORT / PUMP  10/19/2020  LA ABDOMEN SURGERY PROC UNLISTED    
 gastric bypass [address] AND 1994  LA GASTRIC BYPASS,OBESE<150CM SAW-[id number]  7/08  
 [facility]  LA ICAR CATHETER ABLATION ATRIOVENTR NODE FUNCTION N/A 11/17/2020 ABLATION AV NODE performed by Janeth Mcekon MD at [address] [address], [facility] CATH LAB [address]  LA NEEDLE BIOPSY LIVER,W OTHR PROC  8.21.07  
 LA RELOCATION OF SKIN POCKET FOR PACEMAKER N/A 4/24/2020 RELOCATE [address] performed by Shad Hubbard MD at [address] CATH LAB  OR RMVL TRANSVNS PM ELTRD 1 LEAD SYS ATR/VENTR N/A 4/24/2020 Remove Pacemaker Dual Leads performed by Shad Hubbard MD at [facility] CARD[facility]AC CATH LAB  OR RMVL TRANSVNS PM ELTRD 1 LEAD SYS ATR/VENTR N/A 4/27/2020 REMOVE PACEMAKER DUAL LEADS performed by Shad Hubbard MD at [address] CATH LAB  OR TCAT INSJ/RPL PERM LEADLESS PACEMAKER RV W/IMG N/A 11/17/2020 INSERT OR REPLACE TRANSCATH PPM LEADLESS performed by Ladarius Danielson MD at [address] [address], [facility] CATH LAB  OR TRABECULOPLASTY BY LASER SURGERY    
 US GUIDE ASP OVARIAN CYST ABD APPROACH  8.21.07  
 RIGHT Family History Problem Relation Age of Onset  Cancer Mother  Heart Disease Mother  Alcohol abuse Father  Diabetes Brother  Hypertension Brother History reviewed, no pertinent family history. Social History Tobacco Use  Smoking status: Never Smoker  Smokeless tobacco: Never Used Substance Use Topics  Alcohol use: Not Currently Allergies Allergen Reactions  Avapro [Irbesartan] Unable to Obtain  Bactrim [Sulfamethoxazole-Trimethoprim] Other (comments) Sore mouth  Contrast Agent [Iodine] Itching [address]  Morphine Nausea and Vomiting and Swelling  Penicillins Hives Did not tolerate cefepime 3/2020  Pravachol [Pravastatin] Nausea Only  Seafood [Shellfish Containing Products] Hives  Singulair [Montelukast] Other (comments)  
  palpitations  Ace Inhibitors Cough  Amlodipine Swelling  Captopril Cough  Carvedilol Diarrhea Current Facility-Administered Medications Medication Dose Route Frequency  0.9% sodium chloride infusion 250 mL  250 mL IntraVENous PRN  potassium chloride 20 mEq in 50 ml IVPB  20 mEq IntraVENous Q1H  
 bumetanide (BUMEX) injection 2 mg  2 mg IntraVENous BID  insulin lispro (HUMALOG) injection   SubCUTAneous Q6H  
 dextrose (D50W) injection syrg 12.5-25 g  12.5-25 g IntraVENous PRN  
 insulin glargine (LANTUS) injection 12 Units  12 Units SubCUTAneous Q12H  
 fentaNYL (PF) 1,500 mcg/30 mL (50 mcg/mL) infusion  0-200 mcg/hr IntraVENous TITRATE  propofol (DIPRIVAN) 10 mg/mL infusion  0-50 mcg/kg/min IntraVENous TITRATE  chlorhexidine (ORAL CARE KIT) 0.12 % mouthwash 15 mL  15 mL Oral Q12H  clopidogreL (PLAVIX) tablet 75 mg  75 mg Oral DAILY  fentaNYL citrate (PF) injection 50 mcg  50 mcg IntraVENous Q2H PRN  
 fentaNYL citrate (PF) injection 25 mcg  25 mcg IntraVENous Q1H PRN  
 aspirin chewable tablet 81 mg  81 mg Per G Tube DAILY  dextrose (D50W) injection syrg 12.5-25 g  12.5-25 g IntraVENous PRN  
 EPINEPHrine (ADRENALIN) 10 mg in 0.9% sodium chloride 250 mL infusion  0-20 mcg/min IntraVENous TITRATE  
 NOREPINephrine (LEVOPHED) 32 mg in 5% dextrose 250 mL infusion  0.5-100 mcg/min IntraVENous TITRATE  famotidine (PF) (PEPCID) 20 mg in 0.9% sodium chloride 10 mL injection  20 mg IntraVENous Q24H  
 0.9% sodium chloride infusion 250 mL  250 mL IntraVENous PRN  
 sodium chloride (NS) flush 5-40 mL  5-40 mL IntraVENous Q8H  
 sodium chloride (NS) flush 5-40 mL  5-40 mL IntraVENous PRN  
 naloxone (NARCAN) injection 0.4 mg  0.4 mg IntraVENous PRN  
 vasopressin (VASOSTRICT) 20 Units in 0.9% sodium chloride 100 mL infusion  0-0.03 Units/min IntraVENous TITRATE  heparin (porcine) 2,000 Units in 0.9% sodium chloride 500 mL Irrigation    PRN  
 balsam peru-castor oiL (VENELEX) ointment   Topical BID  atorvastatin (LIPITOR) tablet 10 mg  10 mg Oral DAILY  [Held by provider] apixaban (ELIQUIS) tablet 5 mg  5 mg Oral Q12H  
 magic mouthwash cpd (without sucralfate)  5 mL Oral TID PRN  
 DOBUTamine (DOBUTREX) 500 mg/250 mL (2,000 mcg/mL) infusion  0-10 mcg/kg/min IntraVENous TITRATE  [Held by provider] carvediloL (COREG) tablet 3.125 mg  3.125 mg Oral BID WITH MEALS  
 alcohol 62% (NOZIN) nasal  1 Ampule  1 Ampule Topical Q12H  
 nitroglycerin (NITROSTAT) tablet 0.4 mg  0.4 mg SubLINGual PRN  
 sodium chloride (NS) flush 5-40 mL  5-40 mL IntraVENous Q8H  
 sodium chloride (NS) flush 5-40 mL  5-40 mL IntraVENous PRN  
 acetaminophen (TYLENOL) tablet 650 mg  650 mg Oral Q6H PRN Or  
 acetaminophen (TYLENOL) suppository 650 mg  650 mg Rectal Q6H PRN  polyethylene glycol (MIRALAX) packet 17 g  17 g Oral DAILY PRN  promethazine (PHENERGAN) tablet 12.5 mg  12.5 mg Oral Q6H PRN Or  
 ondansetron (ZOFRAN) injection 4 mg  4 mg IntraVENous Q6H PRN  
 glucose chewable tablet 16 g  4 Tab Oral PRN  
 dextrose (D50W) injection syrg 12.5-25 g  12.5-25 g IntraVENous PRN  
 glucagon (GLUCAGEN) injection 1 mg  1 mg IntraMUSCular PRN  
 
 
 
 LAB AND IMAGING FINDINGS:  
 
Lab Results Component Value Date/Time WBC 14.9 (H) 04/15/2021 05:03 AM  
 HGB 8.3 (L) 04/15/2021 05:03 AM  
 PLATELET 695 (L) 69/56/5541 05:03 AM  
 
Lab Results Component Value Date/Time Sodium 136 04/15/2021 05:03 AM  
 Potassium 3.4 (L) 04/15/2021 05:03 AM  
 Chloride 106 04/15/2021 05:03 AM  
 CO2 21 04/15/2021 05:03 AM  
 BUN 36 (H) 04/15/2021 05:03 AM  
 Creatinine 2.29 (H) 04/15/2021 05:03 AM  
 Calcium 8.0 (L) 04/15/2021 05:03 AM  
 Magnesium 1.7 04/15/2021 05:03 AM  
 Phosphorus 4.6 04/15/2021 05:03 AM  
  
Lab Results Component Value Date/Time Alk. phosphatase 41 (L) 04/15/2021 05:03 AM  
 Protein, total 4.7 (L) 04/15/2021 05:03 AM  
 Albumin 2.4 (L) 04/15/2021 05:03 AM  
 Globulin 2.3 04/15/2021 05:03 AM  
 
Lab Results Component Value Date/Time INR 1.2 (H) 04/15/2021 05:03 AM  
 Prothrombin time 12.6 (H) 04/15/2021 05:03 AM  
 aPTT 40.3 (H) 04/14/2021 09:54 AM  
  
Lab Results Component Value Date/Time  Iron 10 (L) 12/23/2020 05:29 AM  
 TIBC 301 12/23/2020 05:29 AM  
 Iron % saturation 3 (L) 12/23/2020 05:29 AM  
 Ferritin 17 (L) 12/23/2020 05:29 AM  
  
No results found for: PH, PCO2, PO2 No components found for: [place] Lab Results Component Value Date/Time CK 4,609 (H) 05/14/2020 05:15 AM  
 CK - MB 1.3 04/09/2014 11:00 AM  
  
 
 
   
 
Total time: 50  
Counseling / coordination time, spent as noted above: 40 
> 50% counseling / coordination?: y 
 
Prolonged service was provided for  []30 min   []75 min in face to face time in the presence of the patient, spent as noted above. Time Start:  
Time End:  
Note: this can only be billed with 43321 (initial) or 43413 (follow up). If multiple start / stop times, list each separately. 90

## 2023-02-10 NOTE — DIETITIAN INITIAL EVALUATION ADULT. - CONTINUE CURRENT NUTRITION CARE PLAN
pt to meet and tolerate >85% of estimated kcal/protein via TF upon f/u in 3 days. Enteral nutrition. RD to monitor energy intake, diet order, NFPF (EN tolerance, glucose profile)
Yes

## 2024-04-02 NOTE — PATIENT PROFILE ADULT - NSPROIMPLANTSMEDDEV_GEN_A_NUR
Subjective     Patient ID: Mena is a 17 year old female.    Referring Provider: Reymundo Campos MD  PCP: Reymundo Campos MD    Mena is accompanied by Father and Sibling  If person accompanying child is not their legal guardian, then is their name listed on Authorization for Treatment of Minor by Delegated Persons? NA    Chief Complaint   Patient presents with    Right Knee - Follow-up    Office Visit         Visit Type: FOLLOW - UP    Chief Complaint: Right knee pain     History: The patient notes no major change in condition of her right knee.  She still rates her pain as 4/10.  She gets intermittent episodes of lateral pain with occasional popping.  Symptoms can be relieved by rest.  She has tried using IcyHot a knee sleeve and Voltaren with only limited change in her symptoms.  She is not experiencing giving way.    ROS: pt notes no new musculoskeletal, neurologic or vascular conditions    Exam: The patient is well-developed and has normal affect    ( R ) KNEE: Normal appearance. No atrophy. No effusion.  No joint line tenderness. No joint line crepitation. Full PROM and AROM. Normal power in flexion and extension.  (+) pain w/ resisted extension (+) pain with resisted abduction at the hip    No laxity on varus / valgus, anterior / posterior, pivot shift, or external rotation stress tests.    (+) Tender medial and lateral plica (+) tenderness at the fat pad of the anterolateral soft spot (+) tenderness at iliotibial band at the lateral femoral epicondyle. Patellar tracking is central w/ no tilt or subluxation. No crepitation at the PF joint. Normal patellar mobility. No pain w/ patellar compression .  No apprehension w/ patellar  manipulation. Tibial tubercle is not excessively lateral.    Normal standing alignment. Limb rotational profile is within normal range    No Limp. No difficulty with heel or toe walking. No difficulty with single leg stance.  (+) Pain with single leg hop.      Sciatic nerve  stretch tests are negative. Skin, sensation, and circulation are normal      Impression/ diagnosis: Right lateral knee pain with contributing factors including lateral plica, fat pad impingement, iliotibial band tendinitis      Discussion / plan: I reviewed the clinical findings with the patient and her father.  I noted that she has multiple full sources of pain at her lateral knee including a plica fat pad impingement and iliotibial band tendinitis.  I recommend that she begin a course of formal physical therapy which may address the iliotibial band portion but may not change the intra-articular conditions including a plica and fat pad impingement.  If she has ongoing intra-articular symptoms then a steroid injection would be a consideration.  She can continue with ibuprofen or Voltaren as needed.  Follow-up in 3 to 4 weeks to check her initial response to physical therapy.  She can continue with a knee sleeve and IcyHot cream.  She can pursue activities as tolerated so long as I do not increase her knee pain.       None

## 2024-07-29 NOTE — PROGRESS NOTE ADULT - REASON FOR ADMISSION
Syncope, Pneumonia R/O COVID
COVID+
Syncope, Pneumonia  R/O COVID
36.4

## 2024-09-29 NOTE — ED PROVIDER NOTE - MDM PATIENT STATEMENT FOR ADDL TREATMENT
Vital Signs Last 24 Hrs  T(C): 36.4 (28 Sep 2024 23:30), Max: 36.9 (28 Sep 2024 17:27)  T(F): 97.6 (28 Sep 2024 23:30), Max: 98.4 (28 Sep 2024 17:27)  HR: 95 (28 Sep 2024 23:30) (95 - 124)  BP: 127/80 (28 Sep 2024 23:30) (127/80 - 164/92)  RR: 18 (28 Sep 2024 23:30) (18 - 18)  SpO2: 98% (28 Sep 2024 23:30) (97% - 98%)    Parameters below as of 28 Sep 2024 23:30  Patient On (Oxygen Delivery Method): room air    VITALS:     GENERAL: NAD, lying in bed comfortably  HEAD:  Atraumatic, Normocephalic  EYES: EOMI, PERRLA, conjunctiva and sclera clear  ENT: Moist mucous membranes  NECK: Supple, No JVD  CHEST/LUNG: Clear to auscultation bilaterally; No rales, rhonchi, wheezing, or rubs. Unlabored respirations  HEART: Regular rate and rhythm; No murmurs, rubs, or gallops  ABDOMEN: Bowel sounds present; Soft, Nontender, Nondistended. No hepatomegally  EXTREMITIES:  2+ Peripheral Pulses, brisk capillary refill. No clubbing, cyanosis, or edema  NERVOUS SYSTEM:  Alert & Oriented X3, speech clear. No deficits   MSK: FROM all 4 extremities, full and equal strength  SKIN: No rashes or lesions
Patient with one or more new problems requiring additional work-up/treatment.